# Patient Record
Sex: FEMALE | Race: BLACK OR AFRICAN AMERICAN | NOT HISPANIC OR LATINO | Employment: FULL TIME | URBAN - METROPOLITAN AREA
[De-identification: names, ages, dates, MRNs, and addresses within clinical notes are randomized per-mention and may not be internally consistent; named-entity substitution may affect disease eponyms.]

---

## 2018-02-02 PROBLEM — A53.9 SYPHILIS: Status: ACTIVE | Noted: 2018-02-02

## 2018-02-04 PROBLEM — Z00.00 PHYSICAL EXAM, ANNUAL: Status: ACTIVE | Noted: 2018-02-04

## 2018-02-04 PROBLEM — Z11.3 SCREENING EXAMINATION FOR VENEREAL DISEASE: Status: ACTIVE | Noted: 2018-02-04

## 2018-02-04 PROBLEM — R63.5 WEIGHT GAIN: Status: ACTIVE | Noted: 2018-02-04

## 2018-02-11 PROBLEM — E03.9 ACQUIRED HYPOTHYROIDISM: Status: ACTIVE | Noted: 2018-02-11

## 2018-02-11 PROBLEM — E78.5 HLD (HYPERLIPIDEMIA): Status: ACTIVE | Noted: 2018-02-11

## 2018-02-11 PROBLEM — D64.9 ANEMIA: Status: ACTIVE | Noted: 2018-02-11

## 2018-03-09 PROBLEM — E88.819 INSULIN RESISTANCE: Status: ACTIVE | Noted: 2018-03-09

## 2018-03-20 PROBLEM — D89.89 AUTOIMMUNE DISORDER: Status: ACTIVE | Noted: 2018-03-20

## 2018-05-07 PROBLEM — Z00.00 PHYSICAL EXAM, ANNUAL: Status: RESOLVED | Noted: 2018-02-04 | Resolved: 2018-05-07

## 2018-05-24 ENCOUNTER — INITIAL CONSULT (OUTPATIENT)
Dept: DERMATOLOGY | Facility: CLINIC | Age: 38
End: 2018-05-24
Payer: COMMERCIAL

## 2018-05-24 DIAGNOSIS — L20.89 OTHER ATOPIC DERMATITIS: ICD-10-CM

## 2018-05-24 DIAGNOSIS — L21.9 SEBORRHEIC DERMATITIS: Primary | ICD-10-CM

## 2018-05-24 DIAGNOSIS — L81.9 DYSCHROMIA: ICD-10-CM

## 2018-05-24 PROCEDURE — 99999 PR PBB SHADOW E&M-NEW PATIENT-LVL II: CPT | Mod: PBBFAC,,, | Performed by: DERMATOLOGY

## 2018-05-24 PROCEDURE — 99202 OFFICE O/P NEW SF 15 MIN: CPT | Mod: S$GLB,,, | Performed by: DERMATOLOGY

## 2018-05-24 RX ORDER — KETOCONAZOLE 20 MG/ML
SHAMPOO, SUSPENSION TOPICAL
Qty: 120 ML | Refills: 5 | Status: SHIPPED | OUTPATIENT
Start: 2018-05-24 | End: 2020-03-27

## 2018-05-24 RX ORDER — FLUOCINOLONE ACETONIDE 0.11 MG/ML
OIL TOPICAL
Qty: 1 BOTTLE | Refills: 5 | Status: SHIPPED | OUTPATIENT
Start: 2018-05-24 | End: 2023-04-27

## 2018-05-24 NOTE — PATIENT INSTRUCTIONS
Sunscreens for the Face  Neutrogena ultra sheer dry touch    CeraVe      Start broad spectrum sunscreen with SPF 50 and zinc oxide and/or titanium dioxide.  Use sunscreen daily.      Viviscal or AG-Pro Hair & Nail Vitamins

## 2018-05-24 NOTE — LETTER
May 24, 2018      Stan Espinosa, [facility]  [address] [place] [address]           [facility]  [address] [place] [address]  Phone: [phone]  Fax: [phone]          Patient: Daija Mistry   MR Number: [id number]   YOB: 1980   Date of Visit: 5/24/2018       Dear Stan Espinosa:    Thank you for referring Daija Mistry to me for evaluation. Attached you will find relevant portions of my assessment and plan of care.    If you have questions, please do not hesitate to call me. I look forward to following Daija Mistry along with you.    Sincerely,    Donya Romero MD    Enclosure  CC:  No Recipients    If you would like to receive this communication electronically, please contact [email or web address] or [phone] to request more information on [employer] access.    For providers and/or their staff who would like to refer a patient to [facility], please contact us through our one-stop-shop provider referral line, [facility], [place], at [phone].    If you feel you have received this communication in error or would no longer like to receive these types of communications, please e-mail [email or web address]

## 2018-05-24 NOTE — PROGRESS NOTES
Subjective:       Patient ID:  Daija Mistry is a 38 y.o. female who presents for   Chief Complaint   Patient presents with    Hair Loss     + ANDERSON  dry, flaky, shedding x 8 months    Hyperpigmentation     hyperpigmentation of face x 8 months     Eczema     x 2 years armpits tx otc HTC     Hx of + ANDERSON and elevated ESR at 67.     History of Present Illness: The patient presents with chief complaint of dryness.  Location: scalp  Duration: 8 months  Signs/Symptoms: burning    Prior treatments: shea moisture shampoo, jamaciain oil, asiam wash, clobetasol foam (burning)    She also c/o hyperpigmentation of the face           Review of Systems   Constitutional: Negative for fever and chills.   Gastrointestinal: Negative for nausea and vomiting.   Skin: Positive for itching and rash. Negative for daily sunscreen use, activity-related sunscreen use and recent sunburn.   Hematologic/Lymphatic: Does not bruise/bleed easily.        Objective:    Physical Exam   Constitutional: She appears well-developed and well-nourished. No distress.   Neurological: She is alert and oriented to person, place, and time. She is not disoriented.   Psychiatric: She has a normal mood and affect.   Skin:   Areas Examined (abnormalities noted in diagram):   Scalp / Hair Palpated and Inspected  Head / Face Inspection Performed  Neck Inspection Performed  Chest / Axilla Inspection Performed  Abdomen Inspection Performed  Back Inspection Performed  RUE Inspected  LUE Inspection Performed  Nails and Digits Inspection Performed                   Diagram Legend     Erythematous scaling macule/papule c/w actinic keratosis       Vascular papule c/w angioma      Pigmented verrucoid papule/plaque c/w seborrheic keratosis      Yellow umbilicated papule c/w sebaceous hyperplasia      Irregularly shaped tan macule c/w lentigo     1-2 mm smooth white papules consistent with Milia      Movable subcutaneous cyst with punctum c/w epidermal inclusion cyst       Subcutaneous movable cyst c/w pilar cyst      Firm pink to brown papule c/w dermatofibroma      Pedunculated fleshy papule(s) c/w skin tag(s)      Evenly pigmented macule c/w junctional nevus     Mildly variegated pigmented, slightly irregular-bordered macule c/w mildly atypical nevus      Flesh colored to evenly pigmented papule c/w intradermal nevus       Pink pearly papule/plaque c/w basal cell carcinoma      Erythematous hyperkeratotic cursted plaque c/w SCC      Surgical scar with no sign of skin cancer recurrence      Open and closed comedones      Inflammatory papules and pustules      Verrucoid papule consistent consistent with wart     Erythematous eczematous patches and plaques     Dystrophic onycholytic nail with subungual debris c/w onychomycosis     Umbilicated papule    Erythematous-base heme-crusted tan verrucoid plaque consistent with inflamed seborrheic keratosis     Erythematous Silvery Scaling Plaque c/w Psoriasis     See annotation      Assessment / Plan:        Seborrheic dermatitis  -     ketoconazole (NIZORAL) 2 % shampoo; Wash hair with medicated shampoo at least 2x/week - let sit on scalp at least 5 minutes prior to rinsing  Dispense: 120 mL; Refill: 5  -     fluocinolone (DERMA-SMOOTHE) 0.01 % external oil; Apply oil to scalp twice a day  Dispense: 1 Bottle; Refill: 5  -     + flares of seb derm.  Will treat seb derm first.  Consider biopsy if alopecia worsens to r/o DLE.  No evidence of DLE currently.    Dyschromia  Of face, possible melasma s/p pregnancy.  Will start HQ 6% cream and sunscreen    Other atopic dermatitis  -     crisaborole (EUCRISA) 2 % Oint; AAA bid for atopic dermatitis/eczema  Dispense: 60 g; Refill: 3  -     Of left axilla, will start above med.              Follow-up in about 3 months (around 8/24/2018).

## 2018-11-07 PROBLEM — G45.9 TIA (TRANSIENT ISCHEMIC ATTACK): Status: ACTIVE | Noted: 2018-11-07

## 2018-11-07 PROBLEM — I10 ESSENTIAL HYPERTENSION, BENIGN: Status: ACTIVE | Noted: 2018-11-07

## 2018-11-07 PROBLEM — R94.31 ABNORMAL EKG: Status: ACTIVE | Noted: 2018-11-07

## 2018-11-07 PROBLEM — F32.9 REACTIVE DEPRESSION: Status: ACTIVE | Noted: 2018-11-07

## 2018-11-07 PROBLEM — R00.2 PALPITATION: Status: ACTIVE | Noted: 2018-11-07

## 2018-11-12 ENCOUNTER — TELEPHONE (OUTPATIENT)
Dept: RADIOLOGY | Facility: HOSPITAL | Age: 38
End: 2018-11-12

## 2019-07-25 PROBLEM — R05.3 CHRONIC COUGH: Status: ACTIVE | Noted: 2019-07-25

## 2019-07-25 PROBLEM — Z00.00 ANNUAL PHYSICAL EXAM: Status: ACTIVE | Noted: 2018-02-04

## 2019-07-25 PROBLEM — E55.9 VITAMIN D DEFICIENCY: Status: ACTIVE | Noted: 2019-07-25

## 2019-07-25 PROBLEM — R91.8 ABNORMAL CT LUNG SCREENING: Status: ACTIVE | Noted: 2019-07-25

## 2019-07-25 PROBLEM — E78.2 MIXED HYPERLIPIDEMIA: Status: ACTIVE | Noted: 2018-02-11

## 2019-08-01 ENCOUNTER — TELEPHONE (OUTPATIENT)
Dept: PULMONOLOGY | Facility: CLINIC | Age: 39
End: 2019-08-01

## 2019-08-01 NOTE — TELEPHONE ENCOUNTER
----- Message from Jeannine Giles sent at 7/31/2019  4:24 PM CDT -----  Contact: marci from Dr Richardson's office  doctor called to schedule an urgent appointment specifically with Dr Weiss  And wishes to speak with a nurse regarding this matter.      she  can be reached at [phone]      Thanks  KB

## 2019-08-08 ENCOUNTER — LAB VISIT (OUTPATIENT)
Dept: LAB | Facility: HOSPITAL | Age: 39
End: 2019-08-08
Attending: INTERNAL MEDICINE
Payer: COMMERCIAL

## 2019-08-08 ENCOUNTER — CLINICAL SUPPORT (OUTPATIENT)
Dept: PULMONOLOGY | Facility: CLINIC | Age: 39
End: 2019-08-08
Payer: COMMERCIAL

## 2019-08-08 ENCOUNTER — OFFICE VISIT (OUTPATIENT)
Dept: SLEEP MEDICINE | Facility: CLINIC | Age: 39
End: 2019-08-08
Payer: COMMERCIAL

## 2019-08-08 ENCOUNTER — INITIAL CONSULT (OUTPATIENT)
Dept: RHEUMATOLOGY | Facility: CLINIC | Age: 39
End: 2019-08-08
Payer: COMMERCIAL

## 2019-08-08 VITALS
BODY MASS INDEX: 43.53 KG/M2 | HEIGHT: 65 IN | SYSTOLIC BLOOD PRESSURE: 122 MMHG | DIASTOLIC BLOOD PRESSURE: 74 MMHG | HEART RATE: 112 BPM | WEIGHT: 261.25 LBS

## 2019-08-08 VITALS
DIASTOLIC BLOOD PRESSURE: 74 MMHG | SYSTOLIC BLOOD PRESSURE: 122 MMHG | WEIGHT: 261.25 LBS | HEIGHT: 65 IN | OXYGEN SATURATION: 94 % | HEART RATE: 112 BPM | RESPIRATION RATE: 16 BRPM | BODY MASS INDEX: 43.53 KG/M2

## 2019-08-08 DIAGNOSIS — R05.3 CHRONIC COUGH: Primary | ICD-10-CM

## 2019-08-08 DIAGNOSIS — J84.9 ILD (INTERSTITIAL LUNG DISEASE): Primary | ICD-10-CM

## 2019-08-08 DIAGNOSIS — R05.3 CHRONIC COUGH: ICD-10-CM

## 2019-08-08 DIAGNOSIS — J84.89 ORGANIZED PNEUMONIA: Primary | ICD-10-CM

## 2019-08-08 DIAGNOSIS — R76.8 ANTI-RNP ANTIBODIES PRESENT: ICD-10-CM

## 2019-08-08 DIAGNOSIS — E78.2 MIXED HYPERLIPIDEMIA: ICD-10-CM

## 2019-08-08 DIAGNOSIS — R76.8 ANA POSITIVE: ICD-10-CM

## 2019-08-08 DIAGNOSIS — I10 ESSENTIAL HYPERTENSION, BENIGN: ICD-10-CM

## 2019-08-08 DIAGNOSIS — R76.8 POSITIVE ANA (ANTINUCLEAR ANTIBODY): ICD-10-CM

## 2019-08-08 DIAGNOSIS — J84.89 ORGANIZED PNEUMONIA: ICD-10-CM

## 2019-08-08 DIAGNOSIS — J98.4 RESTRICTIVE LUNG DISEASE: ICD-10-CM

## 2019-08-08 DIAGNOSIS — J84.9 ILD (INTERSTITIAL LUNG DISEASE): ICD-10-CM

## 2019-08-08 LAB
BASOPHILS # BLD AUTO: 0.06 K/UL (ref 0–0.2)
BASOPHILS NFR BLD: 0.4 % (ref 0–1.9)
CRP SERPL-MCNC: 20.7 MG/L (ref 0–8.2)
DIFFERENTIAL METHOD: ABNORMAL
EOSINOPHIL # BLD AUTO: 0.2 K/UL (ref 0–0.5)
EOSINOPHIL NFR BLD: 1.3 % (ref 0–8)
ERYTHROCYTE [DISTWIDTH] IN BLOOD BY AUTOMATED COUNT: 18.1 % (ref 11.5–14.5)
ERYTHROCYTE [SEDIMENTATION RATE] IN BLOOD BY WESTERGREN METHOD: 46 MM/HR (ref 0–36)
HCT VFR BLD AUTO: 42.5 % (ref 37–48.5)
HGB BLD-MCNC: 13.3 G/DL (ref 12–16)
IMM GRANULOCYTES # BLD AUTO: 0.06 K/UL (ref 0–0.04)
IMM GRANULOCYTES NFR BLD AUTO: 0.4 % (ref 0–0.5)
LYMPHOCYTES # BLD AUTO: 4.5 K/UL (ref 1–4.8)
LYMPHOCYTES NFR BLD: 28.7 % (ref 18–48)
MCH RBC QN AUTO: 25.4 PG (ref 27–31)
MCHC RBC AUTO-ENTMCNC: 31.3 G/DL (ref 32–36)
MCV RBC AUTO: 81 FL (ref 82–98)
MONOCYTES # BLD AUTO: 1.3 K/UL (ref 0.3–1)
MONOCYTES NFR BLD: 8 % (ref 4–15)
NEUTROPHILS # BLD AUTO: 9.7 K/UL (ref 1.8–7.7)
NEUTROPHILS NFR BLD: 61.6 % (ref 38–73)
NRBC BLD-RTO: 0 /100 WBC
PLATELET # BLD AUTO: 346 K/UL (ref 150–350)
PMV BLD AUTO: 11.4 FL (ref 9.2–12.9)
RBC # BLD AUTO: 5.24 M/UL (ref 4–5.4)
WBC # BLD AUTO: 15.67 K/UL (ref 3.9–12.7)

## 2019-08-08 PROCEDURE — 99999 PR PBB SHADOW E&M-EST. PATIENT-LVL V: ICD-10-PCS | Mod: PBBFAC,,, | Performed by: INTERNAL MEDICINE

## 2019-08-08 PROCEDURE — 86635 COCCIDIOIDES ANTIBODY: CPT

## 2019-08-08 PROCEDURE — 86331 IMMUNODIFFUSION OUCHTERLONY: CPT | Mod: 91

## 2019-08-08 PROCEDURE — 82785 ASSAY OF IGE: CPT

## 2019-08-08 PROCEDURE — 99205 OFFICE O/P NEW HI 60 MIN: CPT | Mod: 25,S$GLB,, | Performed by: INTERNAL MEDICINE

## 2019-08-08 PROCEDURE — 94060 PR EVAL OF BRONCHOSPASM: ICD-10-PCS | Mod: S$GLB,,, | Performed by: INTERNAL MEDICINE

## 2019-08-08 PROCEDURE — 99999 PR PBB SHADOW E&M-EST. PATIENT-LVL III: CPT | Mod: PBBFAC,,, | Performed by: INTERNAL MEDICINE

## 2019-08-08 PROCEDURE — 99999 PR PBB SHADOW E&M-EST. PATIENT-LVL V: CPT | Mod: PBBFAC,,, | Performed by: INTERNAL MEDICINE

## 2019-08-08 PROCEDURE — 86235 NUCLEAR ANTIGEN ANTIBODY: CPT | Mod: 59

## 2019-08-08 PROCEDURE — 85025 COMPLETE CBC W/AUTO DIFF WBC: CPT

## 2019-08-08 PROCEDURE — 86039 ANTINUCLEAR ANTIBODIES (ANA): CPT

## 2019-08-08 PROCEDURE — 94060 EVALUATION OF WHEEZING: CPT | Mod: S$GLB,,, | Performed by: INTERNAL MEDICINE

## 2019-08-08 PROCEDURE — 86140 C-REACTIVE PROTEIN: CPT

## 2019-08-08 PROCEDURE — 86480 TB TEST CELL IMMUN MEASURE: CPT

## 2019-08-08 PROCEDURE — 99245 OFF/OP CONSLTJ NEW/EST HI 55: CPT | Mod: S$GLB,,, | Performed by: INTERNAL MEDICINE

## 2019-08-08 PROCEDURE — 99245 PR OFFICE CONSULTATION,LEVEL V: ICD-10-PCS | Mod: S$GLB,,, | Performed by: INTERNAL MEDICINE

## 2019-08-08 PROCEDURE — 85652 RBC SED RATE AUTOMATED: CPT

## 2019-08-08 PROCEDURE — 86038 ANTINUCLEAR ANTIBODIES: CPT

## 2019-08-08 PROCEDURE — 99205 PR OFFICE/OUTPT VISIT, NEW, LEVL V, 60-74 MIN: ICD-10-PCS | Mod: 25,S$GLB,, | Performed by: INTERNAL MEDICINE

## 2019-08-08 PROCEDURE — 99999 PR PBB SHADOW E&M-EST. PATIENT-LVL III: ICD-10-PCS | Mod: PBBFAC,,, | Performed by: INTERNAL MEDICINE

## 2019-08-08 RX ORDER — SULFAMETHOXAZOLE AND TRIMETHOPRIM 800; 160 MG/1; MG/1
2 TABLET ORAL
Qty: 12 TABLET | Refills: 3 | Status: SHIPPED | OUTPATIENT
Start: 2019-08-09 | End: 2019-09-08

## 2019-08-08 RX ORDER — PREDNISONE 20 MG/1
20 TABLET ORAL DAILY
Qty: 30 TABLET | Refills: 1 | Status: SHIPPED | OUTPATIENT
Start: 2019-08-08 | End: 2019-09-05

## 2019-08-08 NOTE — PROGRESS NOTES
RHEUMATOLOGY CLINIC INITIAL VISIT    Reason for referral:-  Referred for evaluation of positive ANDERSON and interstitial lung disease.    Chief complaints:-  To get checked for autoimmune disease .    HPI:-  Daija Flores a 39 y.o. pleasant female comes in for an initial visit with above chief complaints.  She was seen by our pulmonologist for interstitial lung disease and was referred for positive ANDERSON.  She also complains of joint pains for the last several months which has significantly reduced since starting prednisone therapy.  She denies any pain today.  She denies photosensitive malar rash, sicca syndrome, Raynaud's phenomenon, treatment resistant headaches, seizures, psychosis, sclerodactyly, calcinosis, esophageal dysfunction.  She complains of severe fatigue in the last month associated with weakness but could not say whether it was proximal or distal weakness.  No choking on dry foods.  No history of recurrent blood clots.  No personal or family history of psoriasis.  Treated for syphilis in the past -10 years ago.    Review of Systems   Constitutional: Negative for chills and fever.   HENT: Negative for congestion and sore throat.    Eyes: Negative for blurred vision and redness.   Respiratory: Negative for cough and shortness of breath.    Cardiovascular: Negative for chest pain and leg swelling.   Gastrointestinal: Negative for abdominal pain.   Genitourinary: Negative for dysuria.   Musculoskeletal: Positive for joint pain. Negative for back pain, falls, myalgias and neck pain.   Skin: Negative for rash.   Neurological: Negative for headaches.   Endo/Heme/Allergies: Does not bruise/bleed easily.   Psychiatric/Behavioral: Negative for memory loss. The patient does not have insomnia.        Past Medical History:   Diagnosis Date    ADHD (attention deficit hyperactivity disorder)     Asthma     Hypothyroidism     TIA (transient ischemic attack)        History reviewed. No pertinent surgical history.  "    Social History     Tobacco Use    Smoking status: Never Smoker    Smokeless tobacco: Never Used   Substance Use Topics    Alcohol use: Yes     Comment: social    Drug use: No       Family History   Problem Relation Age of Onset    Cancer Maternal Aunt         Breast, Back, Lung Cancer    Diabetes Mother     Hypertension Mother     Heart disease Mother     Stroke Mother     Hypertension Father     Heart disease Father        Review of patient's allergies indicates:   Allergen Reactions    Ceftriaxone Swelling     Patient states that BP spike when taken rocephin.     Citrus and derivatives     Codeine Swelling       Vitals:    08/08/19 1240   BP: 122/74   Pulse: (!) 112   Weight: 118.5 kg (261 lb 3.9 oz)   Height: 5' 5" (1.651 m)   PainSc:   5       Physical Exam   Constitutional: She is oriented to person, place, and time and well-developed, well-nourished, and in no distress. No distress.   HENT:   Head: Normocephalic.   Mouth/Throat: Oropharynx is clear and moist.   Eyes: Pupils are equal, round, and reactive to light. Conjunctivae and EOM are normal.   Neck: Normal range of motion. Neck supple.   Cardiovascular: Normal rate and intact distal pulses.   Pulmonary/Chest: Effort normal. No respiratory distress.   Abdominal: Soft. There is no tenderness.   Musculoskeletal:   No synovitis over small joints of hands or feet.  No effusion over large joints.   Neurological: She is alert and oriented to person, place, and time. No cranial nerve deficit.   Skin: Skin is warm. No rash noted. No erythema.   Psychiatric: Mood and affect normal.   Nursing note and vitals reviewed.      Labs:-    Radiographs:-  Independent visualization of images done.    Old and Outside medical records :-  Reviewed old and all outside medical records available in Care Everywhere.    Medication List with Changes/Refills   Current Medications    ALBUTEROL 90 MCG/ACTUATION INHALER    Inhale 2 puffs into the lungs every 6 (six) " hours as needed for Wheezing.    DULAGLUTIDE (TRULICITY) 1.5 MG/0.5 ML PNIJ    Inject 1.5 mg into the skin every 7 days.    FLUOCINOLONE (DERMA-SMOOTHE) 0.01 % EXTERNAL OIL    Apply oil to scalp twice a day    FLUTICASONE (FLONASE) 50 MCG/ACTUATION NASAL SPRAY    2 sprays (100 mcg total) by Each Nare route once daily.    KETOCONAZOLE (NIZORAL) 2 % SHAMPOO    Wash hair with medicated shampoo at least 2x/week - let sit on scalp at least 5 minutes prior to rinsing    LEVOTHYROXINE (SYNTHROID) 88 MCG TABLET    Take 1 tablet (88 mcg total) by mouth once daily.    PREDNISONE (DELTASONE) 20 MG TABLET    Take 1 tablet (20 mg total) by mouth once daily.    QUETIAPINE (SEROQUEL) 50 MG TABLET    Take 1 tablet (50 mg total) by mouth nightly.    SERTRALINE (ZOLOFT) 50 MG TABLET    Take 1 tablet (50 mg total) by mouth once daily.    SULFAMETHOXAZOLE-TRIMETHOPRIM 800-160MG (BACTRIM DS) 800-160 MG TAB    Take 2 tablets by mouth every Mon, Wed, Fri.    TRIAMTERENE-HYDROCHLOROTHIAZIDE 37.5-25 MG (MAXZIDE-25) 37.5-25 MG PER TABLET    Take 1 tablet by mouth once daily.       Assessment/Plans:-  1. ILD (interstitial lung disease)    2. Positive ANDERSON (antinuclear antibody)      Problem List Items Addressed This Visit        Pulmonary    ILD (interstitial lung disease) - Primary    Overview     pulmonary referral for ?sarcoidosis, lupus like syndrome         Current Assessment & Plan     Interstitial lung disease with ground-glass opacities and positive ANDERSON.  Possibly related to CTD related ILD.  Bronchoscopy scheduled for next week.  Continue prednisone in the meantime and once bronchoscopy ruled out infection, start CellCept         Relevant Orders    Rheumatoid factor    Cyclic citrul peptide antibody, IgG    Protein electrophoresis, serum    Immunofixation electrophoresis    DRVVT    Cardiolipin antibody    Beta-2 glycoprotein Abs (IgA, IgG, IgM)    RPR    Anti-scleroderma antibody    RNA polymerase III Ab, IgG    MyoMarker Panel 3     Anti-neutrophilic cytoplasmic antibody    Proteinase 3 Autoantibodies    Myeloperoxidase Antibody (MPO)    CK (Completed)    Aldolase       Immunology/Multi System    Positive ANDERSON (antinuclear antibody)    Current Assessment & Plan     Positive ANDERSON with positive RNP antibody.  ANDERSON was done under direct method.  Will check immunofluorescence testing.  Also check for scleroderma antibodies and myositis antibodies including other activity markers and muscle enzymes to look for any underlying connective tissue disease.         Relevant Orders    Rheumatoid factor    Cyclic citrul peptide antibody, IgG    Protein electrophoresis, serum    Immunofixation electrophoresis    DRVVT    Cardiolipin antibody    Beta-2 glycoprotein Abs (IgA, IgG, IgM)    RPR    Anti-scleroderma antibody    RNA polymerase III Ab, IgG    MyoMarker Panel 3    Anti-neutrophilic cytoplasmic antibody    Proteinase 3 Autoantibodies    Myeloperoxidase Antibody (MPO)    CK (Completed)    Aldolase          Follow up in about 4 weeks (around 9/5/2019).    Thank you for allowing me to participate in the care ofDaija Mistry.    Disclaimer: This note was prepared using voice recognition system and is likely to have sound alike errors and is not proof read.  Please call me with any questions.

## 2019-08-08 NOTE — ASSESSMENT & PLAN NOTE
Positive ANDERSON with positive RNP antibody.  ANDERSON was done under direct method.  Will check immunofluorescence testing.  Also check for scleroderma antibodies and myositis antibodies including other activity markers and muscle enzymes to look for any underlying connective tissue disease.

## 2019-08-08 NOTE — PROGRESS NOTES
Subjective:       Patient ID: Daija Mistry is a 39 y.o. female.  Patient Active Problem List   Diagnosis    Syphilis    Weight gain    Annual physical exam    Anemia    Mixed hyperlipidemia    Acquired hypothyroidism    Insulin resistance    Autoimmune disorder    TIA (transient ischemic attack)    Essential hypertension, benign    Palpitation    Reactive depression    Abnormal EKG    ILD (interstitial lung disease)    Vitamin D deficiency    Chronic cough    Restrictive lung disease       Chief Complaint:  Daija Mistry 39 y.o.  Referred to me by Dr. 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  She tells me she has a longstanding history of asthma.  This asthma has been stable hardly requiring any interventions.  Early this year she developed a cough shortness of breath congestion heart be admitted at [facility].  During this admission she was treated as pneumonia with multiple antibiotics and steroids.  She was seen by Dr. Mccauley.  Since her discharge she has been on high-dose steroids at  40 mg this was weaned down to 20 now 10 and has been bumped up back to 20 mg.  She is very active at baseline however most recently very short of breath dyspnea on exertion walking from the parking lot to here.  She denies any fever in no weight loss no hemoptysis no sick contacts no travel.  She denies any knowledge of history of autoimmune problems however most recently she had some blood work last year and this year which showed a positive ANDERSON.  I have reviewed most of her lab work which show that her RNP was positive 1.7.    Her smooth muscle antibody was also positive.  SSB was elevated.  Rheumatoid factor was negative.  She denies any joint aches any iritis symptoms.  Denies any skin lesions.  She has had some falling up overhead.  She has a CT scan that was performed in April of 2019  and May of 2019 which were reviewed  Findings were reviewed with patient  She shows extensive subpleural reticular nodular changes of fibrotic changes.  Differentials UIP or NSIP  CAT was -ve for PE  In essence she has an interstitial lung disease with positive ANDERSON positive RNP serologies.  She will need to establish care with Rheumatology.  Her like her to stay on prednisone 20 mg with Bactrim for PCP prophylaxis.  For asthma she had been started on Breo Ellipta.  She is still on this treatment  She has nebulizer at home     Immunization History   Administered Date(s) Administered    Tdap 02/02/2018     Social History     Tobacco Use   Smoking Status Never Smoker   Smokeless Tobacco Never Used           Immunization History   Administered Date(s) Administered    Tdap 02/02/2018           The following portions of the patient's history were reviewed and updated as appropriate:   She  has a past medical history of ADHD (attention deficit hyperactivity disorder), Asthma, Hypothyroidism, and TIA (transient ischemic attack).  She does not have any pertinent problems on file.  She  has no past surgical history on file.  Her family history includes Cancer in her maternal aunt; Diabetes in her mother; Heart disease in her father and mother; Hypertension in her father and mother; Stroke in her mother.  She  reports that she has never smoked. She has never used smokeless tobacco. She reports that she drinks alcohol. She reports that she does not use drugs.  She has a current medication list which includes the following prescription(s): dulaglutide, fluticasone propionate, ketoconazole, levothyroxine, quetiapine, sertraline, triamterene-hydrochlorothiazide 37.5-25 mg, albuterol, fluocinolone, prednisone, and sulfamethoxazole-trimethoprim 800-160mg.  Current Outpatient Medications on File Prior to Visit   Medication Sig Dispense Refill    dulaglutide (TRULICITY) 1.5 mg/0.5 mL PnIj Inject 1.5 mg into the skin every 7 days. 2  "mL 5    fluticasone (FLONASE) 50 mcg/actuation nasal spray 2 sprays (100 mcg total) by Each Nare route once daily. 16 g 2    ketoconazole (NIZORAL) 2 % shampoo Wash hair with medicated shampoo at least 2x/week - let sit on scalp at least 5 minutes prior to rinsing 120 mL 5    levothyroxine (SYNTHROID) 88 MCG tablet Take 1 tablet (88 mcg total) by mouth once daily. 30 tablet 5    QUEtiapine (SEROQUEL) 50 MG tablet Take 1 tablet (50 mg total) by mouth nightly. 30 tablet 3    sertraline (ZOLOFT) 50 MG tablet Take 1 tablet (50 mg total) by mouth once daily. 30 tablet 5    triamterene-hydrochlorothiazide 37.5-25 mg (MAXZIDE-25) 37.5-25 mg per tablet Take 1 tablet by mouth once daily. 30 tablet 5    albuterol 90 mcg/actuation inhaler Inhale 2 puffs into the lungs every 6 (six) hours as needed for Wheezing. 1 Inhaler 1    fluocinolone (DERMA-SMOOTHE) 0.01 % external oil Apply oil to scalp twice a day 1 Bottle 5     No current facility-administered medications on file prior to visit.      She is allergic to ceftriaxone; citrus and derivatives; and codeine..     Review of Systems   Constitutional: Positive for malaise/fatigue.   HENT: Negative.    Eyes: Negative.    Respiratory: Positive for shortness of breath.    Cardiovascular: Negative.  Negative for chest pain, palpitations, orthopnea and claudication.   Gastrointestinal: Negative.    Genitourinary: Negative.    Musculoskeletal: Negative.    Skin: Negative.    Psychiatric/Behavioral: The patient is nervous/anxious.         Objective:       Vitals:    08/08/19 1104   BP: 122/74   Pulse: (!) 112   Resp: 16   SpO2: (!) 94%   Weight: 118.5 kg (261 lb 3.9 oz)   Height: 5' 5" (1.651 m)     Physical Exam   Constitutional: She is oriented to person, place, and time. She appears well-developed and well-nourished.   HENT:   Head: Normocephalic and atraumatic.   Nose: Nose normal.   Mouth/Throat: Oropharynx is clear and moist. No oropharyngeal exudate.   Eyes: Pupils are " equal, round, and reactive to light. Conjunctivae and EOM are normal. Left eye exhibits no discharge.   Neck: Normal range of motion. Neck supple. No JVD present. No tracheal deviation present. No thyromegaly present.   Cardiovascular: Normal rate, regular rhythm and normal heart sounds.   Pulmonary/Chest: Effort normal. No stridor. No respiratory distress. She has no wheezes.   Inspiratory crackles   Abdominal: Soft. Bowel sounds are normal. She exhibits no distension. There is no tenderness.   Musculoskeletal: Normal range of motion. She exhibits no edema.   Lymphadenopathy:     She has no cervical adenopathy.   Neurological: She is alert and oriented to person, place, and time. No cranial nerve deficit.   Skin: Skin is warm and dry. Capillary refill takes 2 to 3 seconds.   Psychiatric: She has a normal mood and affect.   Nursing note and vitals reviewed.        Data Review:   Diagnostics: CT of chest performed on April 2019 and may 2019               Labs reveiwed on my chart  ANDERSON +ve 2018 and 2019  RNP +ve 1.7  SSB was 2.4  dSDNA was -ve  ESR was 66  HIV was -ve  RF was -ve  UA prot    BUN 13, Creat 0.86  Assessment:        Problem List Items Addressed This Visit     Mixed hyperlipidemia    Essential hypertension, benign    Overview     well controlled on meds         ILD (interstitial lung disease)    Overview     pulmonary referral for ?sarcoidosis, lupus like syndrome         Chronic cough    Overview     pulmonary specialist  continue steroids for now  continue inhalers for now         Relevant Medications    predniSONE (DELTASONE) 20 MG tablet    sulfamethoxazole-trimethoprim 800-160mg (BACTRIM DS) 800-160 mg Tab (Start on 8/9/2019)    Other Relevant Orders    Ambulatory Referral to Rheumatology    X-Ray Chest PA And Lateral    Case request GI: Bronchoscopy (Completed)    ANGIOTENSIN CONVERTING ENZYME    B-TYPE NATRIURETIC PEPTIDE    Restrictive lung disease    Current Assessment & Plan     FVC  2.03L (  61.8%), FEV1/FVC 78.13  FEV1 : 1.59L(58.3%)           Other Visit Diagnoses     Organized pneumonia    -  Primary    Relevant Medications    predniSONE (DELTASONE) 20 MG tablet    sulfamethoxazole-trimethoprim 800-160mg (BACTRIM DS) 800-160 mg Tab (Start on 8/9/2019)    Other Relevant Orders    Spirometry with/without bronchodilator    Sedimentation rate    C-REACTIVE PROTEIN    FUNGAL PRECIPITINS (HYPERSENSITIVITY PNEUMONITISI)    FUNGAL IMMUNODIFFUSION - BLOOD    ANDERSON    IGE    CBC auto differential    ANTI SM/RNP ANTIBODY    QUANTIFERON GOLD TB    Complete PFT without bronchodilator - Clinic    Stress test, pulmonary    PULM - Arterial Blood Gases--in addition to PFT only    Ambulatory Referral to Rheumatology    X-Ray Chest PA And Lateral    Case request GI: Bronchoscopy (Completed)    ANGIOTENSIN CONVERTING ENZYME    ANDERSON positive        Relevant Medications    predniSONE (DELTASONE) 20 MG tablet    sulfamethoxazole-trimethoprim 800-160mg (BACTRIM DS) 800-160 mg Tab (Start on 8/9/2019)    Other Relevant Orders    Spirometry with/without bronchodilator    Sedimentation rate    C-REACTIVE PROTEIN    FUNGAL PRECIPITINS (HYPERSENSITIVITY PNEUMONITISI)    FUNGAL IMMUNODIFFUSION - BLOOD    ANDERSON    IGE    CBC auto differential    ANTI SM/RNP ANTIBODY    QUANTIFERON GOLD TB    Complete PFT without bronchodilator - Clinic    Stress test, pulmonary    PULM - Arterial Blood Gases--in addition to PFT only    Ambulatory Referral to Rheumatology    X-Ray Chest PA And Lateral    Case request GI: Bronchoscopy (Completed)    ANGIOTENSIN CONVERTING ENZYME    Anti-RNP antibodies present        Relevant Medications    predniSONE (DELTASONE) 20 MG tablet    sulfamethoxazole-trimethoprim 800-160mg (BACTRIM DS) 800-160 mg Tab (Start on 8/9/2019)    Other Relevant Orders    Spirometry with/without bronchodilator    Sedimentation rate    C-REACTIVE PROTEIN    FUNGAL PRECIPITINS (HYPERSENSITIVITY PNEUMONITISI)    FUNGAL IMMUNODIFFUSION -  BLOOD    ANDERSON    IGE    CBC auto differential    ANTI SM/RNP ANTIBODY    QUANTIFERON GOLD TB    Complete PFT without bronchodilator - Clinic    Stress test, pulmonary    PULM - Arterial Blood Gases--in addition to PFT only    Ambulatory Referral to Rheumatology    X-Ray Chest PA And Lateral    Case request GI: Bronchoscopy (Completed)    ANGIOTENSIN CONVERTING ENZYME        recommended see Rheumatology  Stay on Prednisone 20 mg with bactrim for PCP prophylaxis    Plan:        Orders Placed This Encounter   Procedures    X-Ray Chest PA And Lateral     Standing Status:   Future     Standing Expiration Date:   8/7/2020    Sedimentation rate     Standing Status:   Future     Standing Expiration Date:   10/6/2020    C-REACTIVE PROTEIN     Standing Status:   Future     Standing Expiration Date:   10/6/2020    FUNGAL PRECIPITINS (HYPERSENSITIVITY PNEUMONITISI)     Standing Status:   Future     Standing Expiration Date:   10/6/2020    FUNGAL IMMUNODIFFUSION - BLOOD     Standing Status:   Future     Standing Expiration Date:   10/6/2020    ANDERSON     Standing Status:   Future     Standing Expiration Date:   10/6/2020    IGE     Standing Status:   Future     Standing Expiration Date:   10/6/2020    CBC auto differential     Standing Status:   Future     Standing Expiration Date:   10/6/2020    ANTI SM/RNP ANTIBODY     Standing Status:   Future     Standing Expiration Date:   10/6/2020    QUANTIFERON GOLD TB     Standing Status:   Future     Standing Expiration Date:   10/6/2020    ANGIOTENSIN CONVERTING ENZYME     Standing Status:   Future     Standing Expiration Date:   10/6/2020    B-TYPE NATRIURETIC PEPTIDE     Standing Status:   Future     Standing Expiration Date:   10/6/2020    Ambulatory Referral to Rheumatology     Referral Priority:   Routine     Referral Type:   Consultation     Referral Reason:   Specialty Services Required     Requested Specialty:   Rheumatology     Number of Visits Requested:   1     Spirometry with/without bronchodilator     Standing Status:   Future     Standing Expiration Date:   8/7/2020    Complete PFT without bronchodilator - Clinic     Standing Status:   Future     Standing Expiration Date:   8/8/2020    Stress test, pulmonary     Standing Status:   Future     Standing Expiration Date:   8/7/2020    PULM - Arterial Blood Gases--in addition to PFT only     Standing Status:   Future     Standing Expiration Date:   8/7/2020    Case request GI: Bronchoscopy     Order Specific Question:   Medical Necessity:     Answer:   Medically Urgent [101]     Order Specific Question:   CPT Code:     Answer:   AK BRONCHOSCOPY,TRANSBRONCH BIOPSY [66916]     Order Specific Question:   Add on case?     Answer:   Yes [1]     Order Specific Question:   Requested Time     Answer:   7:00 AM     Order Specific Question:   Case Referring Provider     Answer:   VIRGILIO COLEMAN [[id number]]     Order Specific Question:   Positioning:     Answer:   Supine [1004]     Order Specific Question:   Post-Procedure Disposition:     Answer:   Amb Surgery/DOSC [2]       Requested Prescriptions     Signed Prescriptions Disp Refills    predniSONE (DELTASONE) 20 MG tablet 30 tablet 1     Sig: Take 1 tablet (20 mg total) by mouth once daily.    sulfamethoxazole-trimethoprim 800-160mg (BACTRIM DS) 800-160 mg Tab 12 tablet 3     Sig: Take 2 tablets by mouth every Mon, Wed, Fri.       Follow up in about 4 weeks (around 9/5/2019), or labs today, kaitlin today, copy of Echo, cxr today, for PFT, 6MWD, ABG, Bronchoscopy consent and prep, Referal to Rheumatology.    This note was prepared using voice recognition system and is likely to have sound alike errors that may have been overlooked even after proof reading.  Please call me with any questions    Discussed diagnosis, its evaluation, treatment and usual course. All questions answered.    Thank you for the courtesy of participating in the care of this patient    Virgilio Coleman  MD

## 2019-08-08 NOTE — H&P (VIEW-ONLY)
Subjective:       Patient ID: Daija Mistry is a 39 y.o. female.  Patient Active Problem List   Diagnosis    Syphilis    Weight gain    Annual physical exam    Anemia    Mixed hyperlipidemia    Acquired hypothyroidism    Insulin resistance    Autoimmune disorder    TIA (transient ischemic attack)    Essential hypertension, benign    Palpitation    Reactive depression    Abnormal EKG    ILD (interstitial lung disease)    Vitamin D deficiency    Chronic cough    Restrictive lung disease       Chief Complaint:  Daija Mistry 39 y.o.  Referred to me by Dr. 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  She tells me she has a longstanding history of asthma.  This asthma has been stable hardly requiring any interventions.  Early this year she developed a cough shortness of breath congestion heart be admitted at [facility].  During this admission she was treated as pneumonia with multiple antibiotics and steroids.  She was seen by Dr. Mccauley.  Since her discharge she has been on high-dose steroids at  40 mg this was weaned down to 20 now 10 and has been bumped up back to 20 mg.  She is very active at baseline however most recently very short of breath dyspnea on exertion walking from the parking lot to here.  She denies any fever in no weight loss no hemoptysis no sick contacts no travel.  She denies any knowledge of history of autoimmune problems however most recently she had some blood work last year and this year which showed a positive ANDERSON.  I have reviewed most of her lab work which show that her RNP was positive 1.7.    Her smooth muscle antibody was also positive.  SSB was elevated.  Rheumatoid factor was negative.  She denies any joint aches any iritis symptoms.  Denies any skin lesions.  She has had some falling up overhead.  She has a CT scan that was performed in April of 2019  and May of 2019 which were reviewed  Findings were reviewed with patient  She shows extensive subpleural reticular nodular changes of fibrotic changes.  Differentials UIP or NSIP  CAT was -ve for PE  In essence she has an interstitial lung disease with positive ANDERSON positive RNP serologies.  She will need to establish care with Rheumatology.  Her like her to stay on prednisone 20 mg with Bactrim for PCP prophylaxis.  For asthma she had been started on Breo Ellipta.  She is still on this treatment  She has nebulizer at home     Immunization History   Administered Date(s) Administered    Tdap 02/02/2018     Social History     Tobacco Use   Smoking Status Never Smoker   Smokeless Tobacco Never Used           Immunization History   Administered Date(s) Administered    Tdap 02/02/2018           The following portions of the patient's history were reviewed and updated as appropriate:   She  has a past medical history of ADHD (attention deficit hyperactivity disorder), Asthma, Hypothyroidism, and TIA (transient ischemic attack).  She does not have any pertinent problems on file.  She  has no past surgical history on file.  Her family history includes Cancer in her maternal aunt; Diabetes in her mother; Heart disease in her father and mother; Hypertension in her father and mother; Stroke in her mother.  She  reports that she has never smoked. She has never used smokeless tobacco. She reports that she drinks alcohol. She reports that she does not use drugs.  She has a current medication list which includes the following prescription(s): dulaglutide, fluticasone propionate, ketoconazole, levothyroxine, quetiapine, sertraline, triamterene-hydrochlorothiazide 37.5-25 mg, albuterol, fluocinolone, prednisone, and sulfamethoxazole-trimethoprim 800-160mg.  Current Outpatient Medications on File Prior to Visit   Medication Sig Dispense Refill    dulaglutide (TRULICITY) 1.5 mg/0.5 mL PnIj Inject 1.5 mg into the skin every 7 days. 2  "mL 5    fluticasone (FLONASE) 50 mcg/actuation nasal spray 2 sprays (100 mcg total) by Each Nare route once daily. 16 g 2    ketoconazole (NIZORAL) 2 % shampoo Wash hair with medicated shampoo at least 2x/week - let sit on scalp at least 5 minutes prior to rinsing 120 mL 5    levothyroxine (SYNTHROID) 88 MCG tablet Take 1 tablet (88 mcg total) by mouth once daily. 30 tablet 5    QUEtiapine (SEROQUEL) 50 MG tablet Take 1 tablet (50 mg total) by mouth nightly. 30 tablet 3    sertraline (ZOLOFT) 50 MG tablet Take 1 tablet (50 mg total) by mouth once daily. 30 tablet 5    triamterene-hydrochlorothiazide 37.5-25 mg (MAXZIDE-25) 37.5-25 mg per tablet Take 1 tablet by mouth once daily. 30 tablet 5    albuterol 90 mcg/actuation inhaler Inhale 2 puffs into the lungs every 6 (six) hours as needed for Wheezing. 1 Inhaler 1    fluocinolone (DERMA-SMOOTHE) 0.01 % external oil Apply oil to scalp twice a day 1 Bottle 5     No current facility-administered medications on file prior to visit.      She is allergic to ceftriaxone; citrus and derivatives; and codeine..     Review of Systems   Constitutional: Positive for malaise/fatigue.   HENT: Negative.    Eyes: Negative.    Respiratory: Positive for shortness of breath.    Cardiovascular: Negative.  Negative for chest pain, palpitations, orthopnea and claudication.   Gastrointestinal: Negative.    Genitourinary: Negative.    Musculoskeletal: Negative.    Skin: Negative.    Psychiatric/Behavioral: The patient is nervous/anxious.         Objective:       Vitals:    08/08/19 1104   BP: 122/74   Pulse: (!) 112   Resp: 16   SpO2: (!) 94%   Weight: 118.5 kg (261 lb 3.9 oz)   Height: 5' 5" (1.651 m)     Physical Exam   Constitutional: She is oriented to person, place, and time. She appears well-developed and well-nourished.   HENT:   Head: Normocephalic and atraumatic.   Nose: Nose normal.   Mouth/Throat: Oropharynx is clear and moist. No oropharyngeal exudate.   Eyes: Pupils are " equal, round, and reactive to light. Conjunctivae and EOM are normal. Left eye exhibits no discharge.   Neck: Normal range of motion. Neck supple. No JVD present. No tracheal deviation present. No thyromegaly present.   Cardiovascular: Normal rate, regular rhythm and normal heart sounds.   Pulmonary/Chest: Effort normal. No stridor. No respiratory distress. She has no wheezes.   Inspiratory crackles   Abdominal: Soft. Bowel sounds are normal. She exhibits no distension. There is no tenderness.   Musculoskeletal: Normal range of motion. She exhibits no edema.   Lymphadenopathy:     She has no cervical adenopathy.   Neurological: She is alert and oriented to person, place, and time. No cranial nerve deficit.   Skin: Skin is warm and dry. Capillary refill takes 2 to 3 seconds.   Psychiatric: She has a normal mood and affect.   Nursing note and vitals reviewed.        Data Review:   Diagnostics: CT of chest performed on April 2019 and may 2019               Labs reveiwed on my chart  ANDERSON +ve 2018 and 2019  RNP +ve 1.7  SSB was 2.4  dSDNA was -ve  ESR was 66  HIV was -ve  RF was -ve  UA prot    BUN 13, Creat 0.86  Assessment:        Problem List Items Addressed This Visit     Mixed hyperlipidemia    Essential hypertension, benign    Overview     well controlled on meds         ILD (interstitial lung disease)    Overview     pulmonary referral for ?sarcoidosis, lupus like syndrome         Chronic cough    Overview     pulmonary specialist  continue steroids for now  continue inhalers for now         Relevant Medications    predniSONE (DELTASONE) 20 MG tablet    sulfamethoxazole-trimethoprim 800-160mg (BACTRIM DS) 800-160 mg Tab (Start on 8/9/2019)    Other Relevant Orders    Ambulatory Referral to Rheumatology    X-Ray Chest PA And Lateral    Case request GI: Bronchoscopy (Completed)    ANGIOTENSIN CONVERTING ENZYME    B-TYPE NATRIURETIC PEPTIDE    Restrictive lung disease    Current Assessment & Plan     FVC  2.03L (  61.8%), FEV1/FVC 78.13  FEV1 : 1.59L(58.3%)           Other Visit Diagnoses     Organized pneumonia    -  Primary    Relevant Medications    predniSONE (DELTASONE) 20 MG tablet    sulfamethoxazole-trimethoprim 800-160mg (BACTRIM DS) 800-160 mg Tab (Start on 8/9/2019)    Other Relevant Orders    Spirometry with/without bronchodilator    Sedimentation rate    C-REACTIVE PROTEIN    FUNGAL PRECIPITINS (HYPERSENSITIVITY PNEUMONITISI)    FUNGAL IMMUNODIFFUSION - BLOOD    ANDERSON    IGE    CBC auto differential    ANTI SM/RNP ANTIBODY    QUANTIFERON GOLD TB    Complete PFT without bronchodilator - Clinic    Stress test, pulmonary    PULM - Arterial Blood Gases--in addition to PFT only    Ambulatory Referral to Rheumatology    X-Ray Chest PA And Lateral    Case request GI: Bronchoscopy (Completed)    ANGIOTENSIN CONVERTING ENZYME    ANDERSON positive        Relevant Medications    predniSONE (DELTASONE) 20 MG tablet    sulfamethoxazole-trimethoprim 800-160mg (BACTRIM DS) 800-160 mg Tab (Start on 8/9/2019)    Other Relevant Orders    Spirometry with/without bronchodilator    Sedimentation rate    C-REACTIVE PROTEIN    FUNGAL PRECIPITINS (HYPERSENSITIVITY PNEUMONITISI)    FUNGAL IMMUNODIFFUSION - BLOOD    ANDERSON    IGE    CBC auto differential    ANTI SM/RNP ANTIBODY    QUANTIFERON GOLD TB    Complete PFT without bronchodilator - Clinic    Stress test, pulmonary    PULM - Arterial Blood Gases--in addition to PFT only    Ambulatory Referral to Rheumatology    X-Ray Chest PA And Lateral    Case request GI: Bronchoscopy (Completed)    ANGIOTENSIN CONVERTING ENZYME    Anti-RNP antibodies present        Relevant Medications    predniSONE (DELTASONE) 20 MG tablet    sulfamethoxazole-trimethoprim 800-160mg (BACTRIM DS) 800-160 mg Tab (Start on 8/9/2019)    Other Relevant Orders    Spirometry with/without bronchodilator    Sedimentation rate    C-REACTIVE PROTEIN    FUNGAL PRECIPITINS (HYPERSENSITIVITY PNEUMONITISI)    FUNGAL IMMUNODIFFUSION -  BLOOD    ANDERSON    IGE    CBC auto differential    ANTI SM/RNP ANTIBODY    QUANTIFERON GOLD TB    Complete PFT without bronchodilator - Clinic    Stress test, pulmonary    PULM - Arterial Blood Gases--in addition to PFT only    Ambulatory Referral to Rheumatology    X-Ray Chest PA And Lateral    Case request GI: Bronchoscopy (Completed)    ANGIOTENSIN CONVERTING ENZYME        recommended see Rheumatology  Stay on Prednisone 20 mg with bactrim for PCP prophylaxis    Plan:        Orders Placed This Encounter   Procedures    X-Ray Chest PA And Lateral     Standing Status:   Future     Standing Expiration Date:   8/7/2020    Sedimentation rate     Standing Status:   Future     Standing Expiration Date:   10/6/2020    C-REACTIVE PROTEIN     Standing Status:   Future     Standing Expiration Date:   10/6/2020    FUNGAL PRECIPITINS (HYPERSENSITIVITY PNEUMONITISI)     Standing Status:   Future     Standing Expiration Date:   10/6/2020    FUNGAL IMMUNODIFFUSION - BLOOD     Standing Status:   Future     Standing Expiration Date:   10/6/2020    ANDERSON     Standing Status:   Future     Standing Expiration Date:   10/6/2020    IGE     Standing Status:   Future     Standing Expiration Date:   10/6/2020    CBC auto differential     Standing Status:   Future     Standing Expiration Date:   10/6/2020    ANTI SM/RNP ANTIBODY     Standing Status:   Future     Standing Expiration Date:   10/6/2020    QUANTIFERON GOLD TB     Standing Status:   Future     Standing Expiration Date:   10/6/2020    ANGIOTENSIN CONVERTING ENZYME     Standing Status:   Future     Standing Expiration Date:   10/6/2020    B-TYPE NATRIURETIC PEPTIDE     Standing Status:   Future     Standing Expiration Date:   10/6/2020    Ambulatory Referral to Rheumatology     Referral Priority:   Routine     Referral Type:   Consultation     Referral Reason:   Specialty Services Required     Requested Specialty:   Rheumatology     Number of Visits Requested:   1     Spirometry with/without bronchodilator     Standing Status:   Future     Standing Expiration Date:   8/7/2020    Complete PFT without bronchodilator - Clinic     Standing Status:   Future     Standing Expiration Date:   8/8/2020    Stress test, pulmonary     Standing Status:   Future     Standing Expiration Date:   8/7/2020    PULM - Arterial Blood Gases--in addition to PFT only     Standing Status:   Future     Standing Expiration Date:   8/7/2020    Case request GI: Bronchoscopy     Order Specific Question:   Medical Necessity:     Answer:   Medically Urgent [101]     Order Specific Question:   CPT Code:     Answer:   IA BRONCHOSCOPY,TRANSBRONCH BIOPSY [[id number]]     Order Specific Question:   Add on case?     Answer:   Yes [1]     Order Specific Question:   Requested Time     Answer:   7:00 AM     Order Specific Question:   Case Referring Provider     Answer:   VIRGILIO COLEMAN [[id number]]     Order Specific Question:   Positioning:     Answer:   Supine [1004]     Order Specific Question:   Post-Procedure Disposition:     Answer:   Amb Surgery/DOSC [2]       Requested Prescriptions     Signed Prescriptions Disp Refills    predniSONE (DELTASONE) 20 MG tablet 30 tablet 1     Sig: Take 1 tablet (20 mg total) by mouth once daily.    sulfamethoxazole-trimethoprim 800-160mg (BACTRIM DS) 800-160 mg Tab 12 tablet 3     Sig: Take 2 tablets by mouth every Mon, Wed, Fri.       Follow up in about 4 weeks (around 9/5/2019), or labs today, kaitlin today, copy of Echo, cxr today, for PFT, 6MWD, ABG, Bronchoscopy consent and prep, Referal to Rheumatology.    This note was prepared using voice recognition system and is likely to have sound alike errors that may have been overlooked even after proof reading.  Please call me with any questions    Discussed diagnosis, its evaluation, treatment and usual course. All questions answered.    Thank you for the courtesy of participating in the care of this patient    Virgilio Coleman  MD

## 2019-08-08 NOTE — LETTER
August 8, 2019      Virgilio Weiss MD  [address]  [place] [address]           [facility]  [address]  [place] [address]  Phone: [phone]  Fax: [phone]          Patient: Daija Mistry   MR Number: [id number]   YOB: 1980   Date of Visit: 8/8/2019       Dear Dr. Virgilio Weiss:    Thank you for referring Daija Mistry to me for evaluation. Attached you will find relevant portions of my assessment and plan of care.    If you have questions, please do not hesitate to call me. I look forward to following Daija Mistry along with you.    Sincerely,    Bob Willis MD    Enclosure  CC:  Flores Richardson MD    If you would like to receive this communication electronically, please contact [email or web address] or [phone] to request more information on [employer] Link access.    For providers and/or their staff who would like to refer a patient to [facility], please contact us through our one-stop-shop provider referral line, [facility], at [phone].    If you feel you have received this communication in error or would no longer like to receive these types of communications, please e-mail [email or web address]

## 2019-08-08 NOTE — LETTER
August 8, 2019        Flores Richardson MD  [address]  [place] [address]             [facility]  [address]  [place] [address]  Phone: [phone]  Fax: [phone]   Patient: Daija Mistry   MR Number: [id number]   YOB: 1980   Date of Visit: 8/8/2019       Dear Dr. Richardson:    Thank you for referring Daija Mistry to me for evaluation. Attached you will find relevant portions of my assessment and plan of care.    If you have questions, please do not hesitate to call me. I look forward to following Daija Mistry along with you.    Sincerely,      Virgilio Weiss MD            CC  No Recipients    Enclosure

## 2019-08-08 NOTE — PATIENT INSTRUCTIONS
Cough, Chronic, Uncertain Cause (Adult)    Everyone has had a cough as part of the common cold, flu, or bronchitis. This kind of cough occurs along with an achy feeling, low-grade fever, nasal and sinus congestion, and a scratchy or sore throat. This usually gets better in 2 to 3 weeks. A cough that lasts longer than 3 weeks may be due to other causes.  If your cough does not improve over the next 2 weeks, further testing may be needed. Follow up with your healthcare provider as advised. Cough suppressants may be recommended. Based on your exam today, the exact cause of your cough is not certain. Below are some common causes for persistent cough.  Smokers cough  Smokers cough doesnt go away. If you continue to smoke, it only gets worse. The cough is from irritation in the air passages. Talk to your healthcare provider about quitting. Medicines or nicotine-replacement products, like gum or the patch, may make quitting easier.  Postnasal drip  A cough that is worse at night may be due to postnasal drip. Excess mucus in the nose drains from the back of your nose to your throat. This triggers the cough reflex. Postnasal drip may be due to a sinus infection or allergy. Common allergens include dust, tobacco smoke (both inhaled and secondhand smoke), environmental pollutants, pollen, mold, pets, cleaning agents, room deodorizers, and chemical fumes. Over-the-counter antihistamines or decongestants may be helpful for allergies. A sinus infection may requires antibiotic treatment. See your healthcare provider if symptoms continue.  Medicines  Certain prescribed medicines can cause a chronic cough in some people:  · ACE inhibitors for high blood pressure. These include benazepril, captopril, enalapril, fosinopril, lisinopril, quinapril, ramipril, and others.  · Beta-blockers for high blood pressure and other conditions. These include propranolol, atenolol, metoprolol, nadolol, and others.  Let your healthcare provider  know if you are taking any of these.  Asthma  Cough may be the only sign of mild asthma. You may have tests to find out if asthma is causing your cough. You may also take asthma medicine on a trial basis.  Acid reflux (heartburn, GERD)  The esophagus is the tube that carries food from the mouth to the stomach. A valve at its lower end prevents stomach acids from flowing upward. If this valve does not work properly, acid from the stomach enters the esophagus. This may cause a burning pain in the upper abdomen or lower chest, belching, or cough. Symptoms are often worse when lying flat. Avoid eating or drinking before bedtime. Try using extra pillows to raise your upper body, or place 4-inch blocks under the head of your bed. You may try an over-the-counter antacid or an acid-blocking medicine such as famotidine, cimetidine, ranitidine, esomeprazole, lansoprazole, or omeprazole. Stronger medicines for this condition can be prescribed by your healthcare provider.  Follow-up care  Follow up with your healthcare provider, or as advised, if your cough does not improve. Further testing may be needed.  Note: If an X-ray was taken, a specialist will review it. You will be notified of any new findings that may affect your care.  When to seek medical advice  Call your healthcare provider right away if any of these occur:  · Mild wheezing or difficulty breathing  · Fever of 100.4ºF (38ºC) or higher, or as directed by your healthcare provider  · Unexpected weight loss  · Coughing up large amounts of colored sputum  · Night sweats (sheets and pajamas get soaking wet)  Call 911, or get immediate medical care  Contact emergency services right away if any of these occur:  · Coughing up blood  · Moderate to severe trouble breathing or wheezing  Date Last Reviewed: 9/13/2015  © 0562-4083 [employer]. [address] [place] [address], [place] [address]. All rights reserved. This information is not intended as a substitute for  professional medical care. Always follow your healthcare professional's instructions.        Direct Laryngoscopy with Bronchoscopy    Laryngoscopy and bronchoscopy are 2 procedures that may be done together. These allow the healthcare provider to see inside the air passages in the throat and lungs. A laryngoscopy looks at the throat and vocal cords. Bronchoscopy looks at the trachea (windpipe) and lungs. These procedures can be used to diagnose and treat certain problems. They can also be used to remove stuck objects. A tissue sample may be taken for testing (biopsy). And certain problems, like cysts or scarring, can be treated. Your healthcare provider will tell you more about your procedure based on why it is being done. This sheet gives you general information about what to expect.  Preparing for the procedure  Prepare for the procedure as you have been instructed. Be sure to tell your healthcare provider about all medicines you take. This includes over-the-counter drugs. It also includes herbs and other supplements. You may need to stop taking some or all of them before surgery. Your healthcare provider will tell you what to stop. Also, follow any directions youre given for not eating or drinking before surgery.  The day of the procedure  The procedure takes 30 to 60 minutes. Before the procedure begins:  · An IV line is put into a vein in your arm or hand. This line delivers fluids and medicines.  · To keep you free of pain, you will be given anesthesia. This may be sedation, which makes you relaxed and drowsy. Local anesthesia may also be injected or sprayed into your throat to numb it. If you are in the hospital, you may be given general anesthesia. This puts you in a state like deep sleep through the procedure.  During the procedure  Here is what to expect during the procedure:  · A tube with a light and a camera called a scope is used. The tube may be flexible or rigid. If a flexible scope is used, it is  passed through your nostril. If the scope is rigid, it is put into your mouth and passed down into the throat.  · The scope is moved through the air passages to the lungs. The scope sends live images from inside the air passages to a video screen. This lets the healthcare provider examine problems more closely.  · If needed, a biopsy is done using small tools put through the scope.  · If a growth is found, tools (including a laser) can be put through the scope to remove it.  After the procedure  You will be taken to a room to recover from the anesthesia. You will receive pain medicine. Your throat may feel numb or scratchy. Swallowing may feel strange at first. This will improve within a few hours. When you are released to go home, have an adult family member or friend ready to drive you.  Recovering at home  Once home, follow any instructions you have been given. These include:  · Take pain medicine as directed.  · Do not eat or drink until swallowing returns to normal. As soon as you can swallow comfortably, drink plenty of water.  · Use throat lozenges as advised by your healthcare provider to help ease throat soreness.  · Rest your voice as instructed by your healthcare provider.  When to call your healthcare provider  After you get home, call the healthcare provider if you have any of the following:  · Chest pain or trouble breathing (call 911 or other emergency service)  · Fever of 100.4°F (38°C) or higher, or as directed by your healthcare provider  · Trouble swallowing doesnt improve or gets worse  · Pain that does not go away even after taking pain medicine  · Severely hoarse voice  · Severe nausea or vomiting  · Bloody vomit  · Cough that brings up more than tiny specks of blood   Follow-up  Within a few weeks, you will receive test results. Your healthcare provider will discuss these with you on the phone or during a follow-up visit. Depending on what was found, you may need further evaluation and  treatment.  Risks and possible complications  Risks of this procedure include:  · Bleeding  · Infection  · Swelling of the throat  · Nosebleed (if the scope is passed through the nostril)  · Gagging  · Vomiting  · Cuts in the mouth, nose, or throat  · Injury to the teeth  · Vocal cord injury  · Breathing problems  · Pneumothorax (collapsed lung)  · Perforation of the pharynx  · Risks of anesthesia    Date Last Reviewed: 6/11/2015  © 8516-7640 [employer]. [address] [place] [address]. All rights reserved. This information is not intended as a substitute for professional medical care. Always follow your healthcare professional's instructions.

## 2019-08-08 NOTE — ASSESSMENT & PLAN NOTE
Interstitial lung disease with ground-glass opacities and positive ANDERSON.  Possibly related to CTD related ILD.  Bronchoscopy scheduled for next week.  Continue prednisone in the meantime and once bronchoscopy ruled out infection, start CellCept

## 2019-08-09 LAB
ANA SER QL IF: POSITIVE
ANA TITR SER IF: NORMAL {TITER}
BRPFT: ABNORMAL
FEF 25 75 CHG: 58.8 %
FEF 25 75 LLN: 1.6
FEF 25 75 POST REF: 68.6 %
FEF 25 75 PRE REF: 43.2 %
FEF 25 75 REF: 2.93
FET100 CHG: 3.4 %
FEV1 CHG: 14.9 %
FEV1 FVC CHG: 4.9 %
FEV1 FVC LLN: 72
FEV1 FVC POST REF: 98.6 %
FEV1 FVC PRE REF: 94 %
FEV1 FVC REF: 83
FEV1 LLN: 2.11
FEV1 POST REF: 67 %
FEV1 PRE REF: 58.3 %
FEV1 REF: 2.72
FVC CHG: 9.6 %
FVC LLN: 2.57
FVC POST REF: 67.7 %
FVC PRE REF: 61.8 %
FVC REF: 3.29
IGE SERPL-ACNC: <35 IU/ML (ref 0–100)
PEF CHG: 10.6 %
PEF LLN: 4.76
PEF POST REF: 101.5 %
PEF PRE REF: 91.7 %
PEF REF: 6.83
POST FEF 25 75: 2.01 L/S (ref 1.6–4.26)
POST FET 100: 8.63 SEC
POST FEV1 FVC: 81.93 % (ref 72.33–93.89)
POST FEV1: 1.82 L (ref 2.11–3.33)
POST FVC: 2.23 L (ref 2.57–4)
POST PEF: 6.93 L/S (ref 4.76–8.89)
PRE FEF 25 75: 1.27 L/S (ref 1.6–4.26)
PRE FET 100: 8.35 SEC
PRE FEV1 FVC: 78.13 % (ref 72.33–93.89)
PRE FEV1: 1.59 L (ref 2.11–3.33)
PRE FVC: 2.03 L (ref 2.57–4)
PRE PEF: 6.26 L/S (ref 4.76–8.89)

## 2019-08-12 LAB
M TB IFN-G CD4+ BCKGRND COR BLD-ACNC: -0.02 IU/ML
MITOGEN IGNF BCKGRD COR BLD-ACNC: >10 IU/ML
MITOGEN IGNF BCKGRD COR BLD-ACNC: NEGATIVE [IU]/ML
NIL: 0.04 IU/ML
TB2 - NIL: -0.02 IU/ML

## 2019-08-13 LAB
A FUMIGATUS1 AB SER QL ID: NORMAL
A FUMIGATUS6 AB SER QL ID: NORMAL
A PULLULANS AB SER QL ID: NORMAL
ASPERGILLUS AB SER QL ID: NORMAL
B DERMAT AB SER QL ID: NORMAL
C IMMITIS AB SER QL ID: NORMAL
H CAPSUL AB SER QL ID: NORMAL
PIGEON SERUM AB QL ID: NORMAL
S RECTIVIRGULA AB SER QL ID: NORMAL
T VULGARIS1 AB SER QL ID: NORMAL

## 2019-08-14 LAB
ANTI SM ANTIBODY: 26.77 EU (ref 0–19.99)
ANTI SM/RNP ANTIBODY: 10.53 EU (ref 0–19.99)
ANTI SM/RNP ANTIBODY: 10.53 EU (ref 0–19.99)
ANTI-SM INTERPRETATION: POSITIVE
ANTI-SM/RNP INTERPRETATION: NEGATIVE
ANTI-SM/RNP INTERPRETATION: NEGATIVE
ANTI-SSA ANTIBODY: 6.16 EU (ref 0–19.99)
ANTI-SSA INTERPRETATION: NEGATIVE
ANTI-SSB ANTIBODY: 2.96 EU (ref 0–19.99)
ANTI-SSB INTERPRETATION: NEGATIVE
DSDNA AB SER-ACNC: ABNORMAL [IU]/ML

## 2019-08-15 ENCOUNTER — TELEPHONE (OUTPATIENT)
Dept: PULMONOLOGY | Facility: CLINIC | Age: 39
End: 2019-08-15

## 2019-08-16 ENCOUNTER — ANESTHESIA EVENT (OUTPATIENT)
Dept: ENDOSCOPY | Facility: HOSPITAL | Age: 39
End: 2019-08-16
Payer: COMMERCIAL

## 2019-08-16 ENCOUNTER — ANESTHESIA (OUTPATIENT)
Dept: ENDOSCOPY | Facility: HOSPITAL | Age: 39
End: 2019-08-16
Payer: COMMERCIAL

## 2019-08-16 ENCOUNTER — HOSPITAL ENCOUNTER (OUTPATIENT)
Facility: HOSPITAL | Age: 39
Discharge: HOME | End: 2019-08-16
Attending: INTERNAL MEDICINE | Admitting: INTERNAL MEDICINE
Payer: COMMERCIAL

## 2019-08-16 VITALS
SYSTOLIC BLOOD PRESSURE: 124 MMHG | OXYGEN SATURATION: 97 % | TEMPERATURE: 99 F | DIASTOLIC BLOOD PRESSURE: 86 MMHG | RESPIRATION RATE: 19 BRPM | HEART RATE: 96 BPM

## 2019-08-16 DIAGNOSIS — J84.9 ILD (INTERSTITIAL LUNG DISEASE): ICD-10-CM

## 2019-08-16 LAB
APPEARANCE FLD: NORMAL
APPEARANCE FLD: NORMAL
B-HCG UR QL: NEGATIVE
BODY FLD TYPE: NORMAL
BODY FLD TYPE: NORMAL
COLOR FLD: COLORLESS
COLOR FLD: COLORLESS
CTP QC/QA: YES
EOSINOPHIL NFR FLD MANUAL: 2 %
KOH PREP SPEC: NORMAL
LYMPHOCYTES NFR FLD MANUAL: 13 %
LYMPHOCYTES NFR FLD MANUAL: 6 %
MONOS+MACROS NFR FLD MANUAL: 86 %
MONOS+MACROS NFR FLD MANUAL: 92 %
NEUTROPHILS NFR FLD MANUAL: 1 %
PATH INTERP FLD-IMP: NORMAL
WBC # FLD: 54 /CU MM
WBC # FLD: 70 /CU MM

## 2019-08-16 PROCEDURE — 88305 TISSUE EXAM BY PATHOLOGIST: CPT | Mod: 26,,, | Performed by: PATHOLOGY

## 2019-08-16 PROCEDURE — 88312 SPECIAL STAINS GROUP 1: CPT | Performed by: PATHOLOGY

## 2019-08-16 PROCEDURE — 81025 URINE PREGNANCY TEST: CPT | Performed by: INTERNAL MEDICINE

## 2019-08-16 PROCEDURE — 87210 SMEAR WET MOUNT SALINE/INK: CPT | Mod: 91

## 2019-08-16 PROCEDURE — 88112 CYTOLOGY SPECIMEN- PULMONARY MEDICAL CYTOLOGY: ICD-10-PCS | Mod: 26,,, | Performed by: PATHOLOGY

## 2019-08-16 PROCEDURE — 88104 CYTOLOGY SPECIMEN- PULMONARY MEDICAL CYTOLOGY: ICD-10-PCS | Mod: 26,59,, | Performed by: PATHOLOGY

## 2019-08-16 PROCEDURE — 31628 PR BRONCHOSCOPY,TRANSBRONCH BIOPSY: ICD-10-PCS | Mod: LT,,, | Performed by: INTERNAL MEDICINE

## 2019-08-16 PROCEDURE — 88305 TISSUE EXAM BY PATHOLOGIST: CPT | Performed by: PATHOLOGY

## 2019-08-16 PROCEDURE — 63600175 PHARM REV CODE 636 W HCPCS: Performed by: INTERNAL MEDICINE

## 2019-08-16 PROCEDURE — 89051 BODY FLUID CELL COUNT: CPT

## 2019-08-16 PROCEDURE — 87077 CULTURE AEROBIC IDENTIFY: CPT

## 2019-08-16 PROCEDURE — 87186 SC STD MICRODIL/AGAR DIL: CPT | Mod: 59

## 2019-08-16 PROCEDURE — 87116 MYCOBACTERIA CULTURE: CPT

## 2019-08-16 PROCEDURE — 31632 BRONCHOSCOPY/LUNG BX ADDL: CPT | Performed by: INTERNAL MEDICINE

## 2019-08-16 PROCEDURE — 31628 BRONCHOSCOPY/LUNG BX EACH: CPT | Mod: RT | Performed by: INTERNAL MEDICINE

## 2019-08-16 PROCEDURE — 88305 CYTOLOGY SPECIMEN- PULMONARY MEDICAL CYTOLOGY: ICD-10-PCS | Mod: 26,,, | Performed by: PATHOLOGY

## 2019-08-16 PROCEDURE — 25000003 PHARM REV CODE 250: Performed by: INTERNAL MEDICINE

## 2019-08-16 PROCEDURE — 31632 PR BRONCHOSCOPY/LUNG BX, ADD'L: ICD-10-PCS | Mod: ,,, | Performed by: INTERNAL MEDICINE

## 2019-08-16 PROCEDURE — 63600175 PHARM REV CODE 636 W HCPCS: Performed by: NURSE ANESTHETIST, CERTIFIED REGISTERED

## 2019-08-16 PROCEDURE — 87102 FUNGUS ISOLATION CULTURE: CPT | Mod: 59

## 2019-08-16 PROCEDURE — 31623 DX BRONCHOSCOPE/BRUSH: CPT | Mod: 51,RT | Performed by: INTERNAL MEDICINE

## 2019-08-16 PROCEDURE — 88104 CYTOPATH FL NONGYN SMEARS: CPT | Mod: 26,59,, | Performed by: PATHOLOGY

## 2019-08-16 PROCEDURE — 87206 SMEAR FLUORESCENT/ACID STAI: CPT | Mod: 91

## 2019-08-16 PROCEDURE — 88312 SPECIAL STAINS GROUP 1: CPT | Mod: 26,,, | Performed by: PATHOLOGY

## 2019-08-16 PROCEDURE — 87015 SPECIMEN INFECT AGNT CONCNTJ: CPT | Mod: 59

## 2019-08-16 PROCEDURE — 37000008 HC ANESTHESIA 1ST 15 MINUTES: Performed by: INTERNAL MEDICINE

## 2019-08-16 PROCEDURE — 88112 CYTOPATH CELL ENHANCE TECH: CPT | Mod: 26,,, | Performed by: PATHOLOGY

## 2019-08-16 PROCEDURE — 31632 BRONCHOSCOPY/LUNG BX ADDL: CPT | Mod: ,,, | Performed by: INTERNAL MEDICINE

## 2019-08-16 PROCEDURE — 37000009 HC ANESTHESIA EA ADD 15 MINS: Performed by: INTERNAL MEDICINE

## 2019-08-16 PROCEDURE — 31623 PR BRONCHOSCOPY,DIAGNOSTIC W BRUSH: ICD-10-PCS | Mod: 51,LT,, | Performed by: INTERNAL MEDICINE

## 2019-08-16 PROCEDURE — 88312 CYTOLOGY SPECIMEN- PULMONARY MEDICAL CYTOLOGY: ICD-10-PCS | Mod: 26,,, | Performed by: PATHOLOGY

## 2019-08-16 PROCEDURE — 87205 SMEAR GRAM STAIN: CPT

## 2019-08-16 PROCEDURE — 27200944 HC BRONCH FORCEPS DISPOSABLE: Performed by: INTERNAL MEDICINE

## 2019-08-16 PROCEDURE — 31624 DX BRONCHOSCOPE/LAVAGE: CPT | Mod: 59,RT,, | Performed by: INTERNAL MEDICINE

## 2019-08-16 PROCEDURE — 31624 PR BRONCHOSCOPY,DIAG2STIC W LAVAGE: ICD-10-PCS | Mod: 59,RT,, | Performed by: INTERNAL MEDICINE

## 2019-08-16 PROCEDURE — 25000242 PHARM REV CODE 250 ALT 637 W/ HCPCS: Performed by: INTERNAL MEDICINE

## 2019-08-16 PROCEDURE — 87070 CULTURE OTHR SPECIMN AEROBIC: CPT | Mod: 59

## 2019-08-16 PROCEDURE — 31624 DX BRONCHOSCOPE/LAVAGE: CPT | Mod: 51,LT | Performed by: INTERNAL MEDICINE

## 2019-08-16 PROCEDURE — 25000003 PHARM REV CODE 250: Performed by: NURSE ANESTHETIST, CERTIFIED REGISTERED

## 2019-08-16 PROCEDURE — 31623 DX BRONCHOSCOPE/BRUSH: CPT | Mod: 51,LT,, | Performed by: INTERNAL MEDICINE

## 2019-08-16 PROCEDURE — 31628 BRONCHOSCOPY/LUNG BX EACH: CPT | Mod: LT,,, | Performed by: INTERNAL MEDICINE

## 2019-08-16 RX ORDER — IPRATROPIUM BROMIDE AND ALBUTEROL SULFATE 2.5; .5 MG/3ML; MG/3ML
3 SOLUTION RESPIRATORY (INHALATION) ONCE
Status: COMPLETED | OUTPATIENT
Start: 2019-08-16 | End: 2019-08-16

## 2019-08-16 RX ORDER — LIDOCAINE HYDROCHLORIDE 10 MG/ML
INJECTION, SOLUTION EPIDURAL; INFILTRATION; INTRACAUDAL; PERINEURAL
Status: DISCONTINUED | OUTPATIENT
Start: 2019-08-16 | End: 2019-08-16

## 2019-08-16 RX ORDER — ROCURONIUM BROMIDE 10 MG/ML
INJECTION, SOLUTION INTRAVENOUS
Status: DISCONTINUED | OUTPATIENT
Start: 2019-08-16 | End: 2019-08-16

## 2019-08-16 RX ORDER — LIDOCAINE HYDROCHLORIDE 40 MG/ML
SOLUTION TOPICAL
Status: COMPLETED | OUTPATIENT
Start: 2019-08-16 | End: 2019-08-16

## 2019-08-16 RX ORDER — PROPOFOL 10 MG/ML
VIAL (ML) INTRAVENOUS
Status: DISCONTINUED | OUTPATIENT
Start: 2019-08-16 | End: 2019-08-16

## 2019-08-16 RX ORDER — FENTANYL CITRATE 50 UG/ML
INJECTION, SOLUTION INTRAMUSCULAR; INTRAVENOUS
Status: DISCONTINUED | OUTPATIENT
Start: 2019-08-16 | End: 2019-08-16

## 2019-08-16 RX ORDER — SODIUM CHLORIDE, SODIUM LACTATE, POTASSIUM CHLORIDE, CALCIUM CHLORIDE 600; 310; 30; 20 MG/100ML; MG/100ML; MG/100ML; MG/100ML
INJECTION, SOLUTION INTRAVENOUS CONTINUOUS
Status: DISCONTINUED | OUTPATIENT
Start: 2019-08-16 | End: 2019-08-16 | Stop reason: HOSPADM

## 2019-08-16 RX ORDER — SUCCINYLCHOLINE CHLORIDE 20 MG/ML
INJECTION INTRAMUSCULAR; INTRAVENOUS
Status: DISCONTINUED | OUTPATIENT
Start: 2019-08-16 | End: 2019-08-16

## 2019-08-16 RX ORDER — ONDANSETRON 2 MG/ML
INJECTION INTRAMUSCULAR; INTRAVENOUS
Status: DISCONTINUED | OUTPATIENT
Start: 2019-08-16 | End: 2019-08-16

## 2019-08-16 RX ADMIN — LIDOCAINE HYDROCHLORIDE 2 ML: 40 SOLUTION TOPICAL at 07:08

## 2019-08-16 RX ADMIN — FENTANYL CITRATE 50 MCG: 50 INJECTION, SOLUTION INTRAMUSCULAR; INTRAVENOUS at 07:08

## 2019-08-16 RX ADMIN — PROPOFOL 200 MG: 10 INJECTION, EMULSION INTRAVENOUS at 07:08

## 2019-08-16 RX ADMIN — SODIUM CHLORIDE, SODIUM LACTATE, POTASSIUM CHLORIDE, AND CALCIUM CHLORIDE: 600; 310; 30; 20 INJECTION, SOLUTION INTRAVENOUS at 07:08

## 2019-08-16 RX ADMIN — SUCCINYLCHOLINE CHLORIDE 100 MG: 20 INJECTION, SOLUTION INTRAMUSCULAR; INTRAVENOUS at 07:08

## 2019-08-16 RX ADMIN — FENTANYL CITRATE 100 MCG: 50 INJECTION, SOLUTION INTRAMUSCULAR; INTRAVENOUS at 07:08

## 2019-08-16 RX ADMIN — LIDOCAINE HYDROCHLORIDE 50 MG: 10 INJECTION, SOLUTION EPIDURAL; INFILTRATION; INTRACAUDAL; PERINEURAL at 07:08

## 2019-08-16 RX ADMIN — ROCURONIUM BROMIDE 5 MG: 10 INJECTION, SOLUTION INTRAVENOUS at 07:08

## 2019-08-16 RX ADMIN — SODIUM CHLORIDE, SODIUM LACTATE, POTASSIUM CHLORIDE, AND CALCIUM CHLORIDE: 600; 310; 30; 20 INJECTION, SOLUTION INTRAVENOUS at 06:08

## 2019-08-16 RX ADMIN — IPRATROPIUM BROMIDE AND ALBUTEROL SULFATE 3 ML: .5; 3 SOLUTION RESPIRATORY (INHALATION) at 08:08

## 2019-08-16 RX ADMIN — ONDANSETRON 4 MG: 2 INJECTION, SOLUTION INTRAMUSCULAR; INTRAVENOUS at 07:08

## 2019-08-16 NOTE — ANESTHESIA PREPROCEDURE EVALUATION
08/16/2019  Daija Luna is a 39 y.o., female.    Pre-op Assessment    I have reviewed the Patient Summary Reports.    I have reviewed the Nursing Notes.   I have reviewed the Medications.     Review of Systems  Anesthesia Hx:  No problems with previous Anesthesia  Neg history of prior surgery. Denies Family Hx of Anesthesia complications.   Denies Personal Hx of Anesthesia complications.   Social:  No Alcohol Use, Non-Smoker    Hematology/Oncology:  Hematology Normal   Oncology Normal     EENT/Dental:EENT/Dental Normal   Cardiovascular:   Hypertension NYHA Classification I ECG has been reviewed.    Pulmonary:   Asthma    Renal/:  Renal/ Normal     Hepatic/GI:  Hepatic/GI Normal    Musculoskeletal:  Musculoskeletal Normal    Neurological:   TIA,    Endocrine:   Hypothyroidism    Dermatological:  Skin Normal    Psych:   Psychiatric History          Physical Exam  General:  Obesity    Airway/Jaw/Neck:  AIRWAY FINDINGS: Normal      Eyes/Ears/Nose:  EYES/EARS/NOSE FINDINGS: Normal   Dental:  DENTAL FINDINGS: Normal   Chest/Lungs:  Chest/Lungs Clear    Heart/Vascular:  Heart Findings: Normal Heart murmur: negative Vascular Findings: Normal    Abdomen:  Abdomen Findings: Normal    Musculoskeletal:  Musculoskeletal Findings: Normal   Skin:  Skin Findings: Normal    Mental Status:  Mental Status Findings: Normal        Anesthesia Plan  Type of Anesthesia, risks & benefits discussed:  Anesthesia Type:  general  Patient's Preference:   Intra-op Monitoring Plan: standard ASA monitors  Intra-op Monitoring Plan Comments:   Post Op Pain Control Plan: IV/PO Opioids PRN  Post Op Pain Control Plan Comments:   Induction:   IV  Beta Blocker:  Patient is not currently on a Beta-Blocker (No further documentation required).       Informed Consent: Patient understands risks and agrees with Anesthesia plan.  Questions  answered. Anesthesia consent signed with patient.  ASA Score: 2     Day of Surgery Review of History & Physical: I have interviewed and examined the patient. I have reviewed the patient's H&P dated:  There are no significant changes.  H&P update referred to the surgeon.  H&P completed by Anesthesiologist.

## 2019-08-16 NOTE — OP NOTE
Bronchoscopy performed  Intubated 7.5 ETT  Normal airway  BAL: RUL, AYLIN  Micro Brush and cyto Brush AYLIN  TBBX: AYLIN, LLL,RLL    CXR post procedure NO pneumothorax

## 2019-08-16 NOTE — DISCHARGE SUMMARY
[facility] -   Discharge Summary     Patient ID:  Daija Luna  [id number]  39 y.o.  1980    Admit date: 8/16/2019    Discharge Date and Time:  08/16/2019 8:13 AM    Admitting Physician: Virgilio Weiss MD     Discharge Provider: Virgilio Weiss    Reason for Admission: Organized pneumonia [J84.89]  ANDERSON positive [R76.8]  Anti-RNP antibodies present [R76.8]  Chronic cough [R05]    Admission Condition: good    Procedures Performed: Procedure(s) (LRB):  Bronchoscopy (Bilateral)    Hospital Course (synopsis of major diagnoses, care, treatment, and services provided during the course of the hospital stay):      Bronchoscopy performed  Intubated 7.5 ETT  Normal airway  BAL: RUL, AYLIN  Micro Brush and cyto Brush AYLIN  TBBX: AYLIN, LLL,RLL    CXR post procedure NO pneumothorax     Consults: None    Significant Diagnostic Studies: bronchoscopy:      Final Diagnoses:    Principal Problem: ILD (interstitial lung disease)   Secondary Diagnoses:   Active Hospital Problems    Diagnosis  POA    *ILD (interstitial lung disease) [J84.9]  Yes     pulmonary referral for ?sarcoidosis, lupus like syndrome      Restrictive lung disease [J98.4]  Yes    Positive ANDERSON (antinuclear antibody) [R76.8]  Yes      Resolved Hospital Problems   No resolved problems to display.       Discharged Condition: stable    Discharge Exam:  /88 (BP Location: Left arm, Patient Position: Lying)   Pulse 99   Temp 99 °F (37.2 °C) (Tympanic)   Resp 19   LMP 08/09/2019   SpO2 96% Comment: 2 liter nasal cannula  Breastfeeding? No   Lungs: clear to auscultation bilaterally    Disposition: Final discharge disposition not confirmed    Follow Up/Patient Instructions:     Medications:  Reconciled Home Medications:      Medication List      ASK your doctor about these medications    albuterol 90 mcg/actuation inhaler  Commonly known as:  PROVENTIL/VENTOLIN HFA  Inhale 2 puffs into the lungs every 6 (six) hours as needed for  Wheezing.     dulaglutide 1.5 mg/0.5 mL Pnij  Commonly known as:  TRULICITY  Inject 1.5 mg into the skin every 7 days.     fluocinolone 0.01 % external oil  Commonly known as:  DERMA-SMOOTHE  Apply oil to scalp twice a day     fluticasone propionate 50 mcg/actuation nasal spray  Commonly known as:  FLONASE  2 sprays (100 mcg total) by Each Nare route once daily.     ketoconazole 2 % shampoo  Commonly known as:  NIZORAL  Wash hair with medicated shampoo at least 2x/week - let sit on scalp at least 5 minutes prior to rinsing     levothyroxine 88 MCG tablet  Commonly known as:  SYNTHROID  Take 1 tablet (88 mcg total) by mouth once daily.     predniSONE 20 MG tablet  Commonly known as:  DELTASONE  Take 1 tablet (20 mg total) by mouth once daily.     QUEtiapine 50 MG tablet  Commonly known as:  SEROQUEL  Take 1 tablet (50 mg total) by mouth nightly.     sulfamethoxazole-trimethoprim 800-160mg 800-160 mg Tab  Commonly known as:  BACTRIM DS  Take 2 tablets by mouth every Mon, Wed, Fri.     triamterene-hydrochlorothiazide 37.5-25 mg 37.5-25 mg per tablet  Commonly known as:  MAXZIDE-25  Take 1 tablet by mouth once daily.          No discharge procedures on file.    Activity: activity as tolerated and no driving for today  Diet: clear liquids, advance as tolerated  Wound Care: none needed    Follow-up with ME in 4 week.    Signed:  Virgilio Weiss  8/16/2019  8:13 AM

## 2019-08-16 NOTE — ANESTHESIA PROCEDURE NOTES
Intubation    Diagnosis: asthma, bronch  Patient location during procedure: done in OR  Procedure start time: 8/16/2019 7:12 AM  Timeout: 8/16/2019 7:11 AM  Procedure end time: 8/16/2019 7:12 AM    Staffing  Authorizing Provider: Sahil Bustillos MD  Performing Provider: Luis Angel Leigh CRNA    Anesthesiologist was present at the time of the procedure.  Preanesthetic Checklist  Completed: patient identified, pre-op evaluation, timeout performed, IV checked, risks and benefits discussed, monitors and equipment checked and anesthesia consent given  Intubation  Indication: airway protection, surgery  Pre-oxygenation. Induction: intravenous, mask ventilation: easy mask.  Intubation: postinduction, Bearden 2.  Endotracheal Tube: oral, 7.5 mm ID, cuffed (inflated to minimal occlusive pressure)  Attempts: 1, Grade I - full view of cords  Complicating Factors: none  Tube secured at 21 cm at the lips.  Findings post-intubation: bilateral breath sounds, positive ETCO2, atraumatic / condition of teeth unchanged  Position Confirmation: auscultation, fiberoptic bronchoscopic inspection  Eye Care: taped closed, laser protective goggles / glasses placed

## 2019-08-16 NOTE — ANESTHESIA POSTPROCEDURE EVALUATION
Anesthesia Post Evaluation    Patient: Daija Luna    Procedure(s) Performed: Procedure(s) (LRB):  Bronchoscopy (Bilateral)    Final Anesthesia Type: general  Patient location during evaluation: PACU  Patient participation: Yes- Able to Participate  Level of consciousness: awake and alert and oriented  Post-procedure vital signs: reviewed and stable  Pain management: adequate  Airway patency: patent  PONV status at discharge: No PONV  Anesthetic complications: no      Cardiovascular status: stable  Respiratory status: unassisted, spontaneous ventilation and room air  Hydration status: euvolemic  Follow-up not needed.          Vitals Value Taken Time   /88 8/16/2019  7:47 AM   Temp 37.2 °C (99 °F) 8/16/2019  7:47 AM   Pulse 100 8/16/2019  7:47 AM   Resp 22 8/16/2019  7:47 AM   SpO2 99 % 8/16/2019  7:47 AM         No case tracking events are documented in the log.      Pain/Junie Score: Junie Score: 9 (8/16/2019  7:47 AM)

## 2019-08-16 NOTE — PLAN OF CARE
Dr. Weiss  At bedside; findings discussed with patient and family. Vital signs stable. Order for one time duoneb ordered; post procedure CXR OK per Dr Weiss; no pneumothorax

## 2019-08-16 NOTE — ANESTHESIA RELEASE NOTE
Anesthesia Release from PACU Note    Patient: Daija Luna    Procedure(s) Performed: Procedure(s) (LRB):  Bronchoscopy (Bilateral)    Anesthesia type: general    Post pain: Adequate analgesia    Post assessment: no apparent anesthetic complications    Last Vitals:   Visit Vitals  BP (!) 140/88 (BP Location: Left arm, Patient Position: Lying)   Pulse 100   Temp 37.2 °C (99 °F) (Tympanic)   Resp (!) 22   LMP 08/09/2019   SpO2 99%   Breastfeeding? No       Post vital signs: stable    Level of consciousness: awake    Nausea/Vomiting: no nausea/no vomiting    Complications: none    Airway Patency: patent    Respiratory: unassisted    Cardiovascular: stable and blood pressure at baseline    Hydration: euvolemic

## 2019-08-16 NOTE — TRANSFER OF CARE
Anesthesia Transfer of Care Note    Patient: Daija Luna    Procedure(s) Performed: Procedure(s) (LRB):  Bronchoscopy (Bilateral)    Patient location: PACU    Transport from OR: Transported from OR on room air with adequate spontaneous ventilation    Post pain: adequate analgesia    Post assessment: no apparent anesthetic complications and tolerated procedure well    Post vital signs: stable    Level of consciousness: awake, alert and oriented    Nausea/Vomiting: no nausea/vomiting    Complications: none    Transfer of care protocol was followed      Last vitals:   Visit Vitals  BP (!) 140/88 (BP Location: Left arm, Patient Position: Lying)   Pulse 100   Temp 37.2 °C (99 °F) (Tympanic)   Resp (!) 22   LMP 08/09/2019   SpO2 99%   Breastfeeding? No

## 2019-08-19 LAB
BACTERIA SPEC AEROBE CULT: NO GROWTH
BACTERIA SPEC AEROBE CULT: NORMAL
GRAM STN SPEC: NORMAL

## 2019-08-21 ENCOUNTER — PATIENT MESSAGE (OUTPATIENT)
Dept: SLEEP MEDICINE | Facility: CLINIC | Age: 39
End: 2019-08-21

## 2019-08-21 ENCOUNTER — PATIENT MESSAGE (OUTPATIENT)
Dept: RHEUMATOLOGY | Facility: CLINIC | Age: 39
End: 2019-08-21

## 2019-08-21 DIAGNOSIS — A49.8 INFECTION DUE TO STENOTROPHOMONAS MALTOPHILIA: Primary | ICD-10-CM

## 2019-08-21 LAB
BACTERIA SPEC AEROBE CULT: ABNORMAL
BACTERIA SPEC AEROBE CULT: ABNORMAL
GRAM STN SPEC: ABNORMAL

## 2019-08-21 RX ORDER — SULFAMETHOXAZOLE AND TRIMETHOPRIM 800; 160 MG/1; MG/1
1 TABLET ORAL 2 TIMES DAILY
Qty: 28 TABLET | Refills: 0 | Status: SHIPPED | OUTPATIENT
Start: 2019-08-21 | End: 2019-09-05 | Stop reason: SDUPTHER

## 2019-08-21 NOTE — PROGRESS NOTES
Gross Description  1.) 2 slides + 1 brush tip received.  1 slide used for gms.  1 slide + 1 gms.  2.) 5mls of cloudy red fluid.  thinprep, cell block:b  SPECIMEN  1) AYLIN cyto brushing set #1 fixed with brush  2) Collective Wash  FINAL PATHOLOGIC DIAGNOSIS  1. Bronchial brush, left upper lobe:  Negative for malignant cells.  Benign bronchial cells and macrophages.  GMS stain is negative for Pneumocystis and other fungal organisms (satisfactory control material).  2. Bronchial wash (collective):  Negative for malignant cells.  Benign bronchial cells and macrophages.  [facility]  Diagnosed by: Ronal Mcintyre M.D.  (Electronically Signed: 2019-08-21 09:43:21)

## 2019-08-21 NOTE — PROGRESS NOTES
Only fibrosis seen on Biopsy  Stenothromomonas will be treated with Bactrim  Virgilio Weiss MD  ORDERING PHYSICIAN(S)  VIRGILIO WEISS  PreOperative Diagnosis  PostOperative Diagnosis  jm  SPECIMEN  1) Left upper lobe biopsy.  2) Left lower lobe biopsy.  3) Right lower lobe biopsy.  FINAL PATHOLOGIC DIAGNOSIS  1. Lung, left upper lobe, biopsy:  Benign respiratory mucosa with moderate interstitial fibrosis.  Negative for significant inflammation, granulomas and malignancy.  2. Lung, left lower lobe, biopsy:  Benign respiratory mucosa with focal moderate fibrosis.  Negative for significant inflammation, granulomas and malignancy.  3. Lung, right lower lobe, biopsy:  Scant fragments of superficial benign bronchial mucosa.  Negative for significant inflammation, granulomas and malignancy.  [facility]  Diagnosed by: Ronal Mcintyre M.D.  (Electronically Signed: 2019-08-21 10:55:26)  Gross Description  1. ID/AP Label: [id number], designated left upper lobe biopsy  Received in formalin labeled with the patient's name and medical record number are 3 tan-red fragments of soft  tissue (up to 0.2 cm). The specimen is filtered through a biopsy bag and submitted entirely into cassette  [id number].  2. ID/AP Label: [id number], designated left lower lobe biopsy  Received in formalin labeled with patient's name medical record number are 2 fragments of white-tan soft tissue

## 2019-08-21 NOTE — PROGRESS NOTES
STENOTROPHOMONAS (X.) MALTOPHILIA   Rare   Normal respiratory radha also present   P       Gram Stain (Respiratory) <10 epithelial cells per low power field.    Gram Stain (Respiratory) Rare WBC's    Gram Stain (Respiratory) No organisms seen    Resulting Agency [facility]   Susceptibility      Stenotrophomonas (X.) Maltophilia     CULTURE, RESPIRATORY (Preliminary)     Ceftazidime 16 mcg/mL Intermediate     Trimeth/Sulfa <=2/38 mcg/mL Sensitive         Already on bactrim

## 2019-08-22 ENCOUNTER — TELEPHONE (OUTPATIENT)
Dept: PULMONOLOGY | Facility: CLINIC | Age: 39
End: 2019-08-22

## 2019-08-22 NOTE — TELEPHONE ENCOUNTER
----- Message from Virgilio Weiss MD sent at 8/22/2019  6:25 AM CDT -----  Regarding: RE:Reminder for Upcoming Procedure  Contact: [phone]  Bactrim 1 DS BID x 14 days sent  Then continue Bactrim MWF    Virgilio Weiss MD    ----- Message -----  From: Fatou Grimm LPN  Sent: 8/21/2019   5:51 PM  To: Virgilio Weiss MD  Subject: FW:Reminder for Upcoming Procedure                   ----- Message -----  From: Daija Luna  Sent: 8/21/2019   9:29 AM  To: [facility] Staff  Subject: RE:Reminder for Upcoming Procedure               Hey. The Rx is Bactrim DS 2 pills po MWF, is this effective for the STENOTROPHOMONAS (X.) MALTOPHILIA. And if so how long?    ----- Message -----  From: Virgilio Weiss MD  Sent: 8/9/2019  8:30 AM CDT  To: Daija Luna  Subject: Reminder for Upcoming Procedure  [facility] [address] [place] [id number]    08/09/2019      Dear Daija,      This is a reminder for your upcoming procedure with Virgilio Weiss MD on 8/16/2019. We will contact you again before the day of your procedure with your scheduled arrival time unless you have already received this information from your physicians office.         If you have questions or scheduling concerns, you can contact your physicians office:   Virgilio Weiss MD  Phone Number: [phone]      Sincerely,     [facility] [address] [place] [id number]

## 2019-08-23 PROBLEM — A53.9 SYPHILIS: Status: RESOLVED | Noted: 2018-02-02 | Resolved: 2019-08-23

## 2019-09-04 ENCOUNTER — TELEPHONE (OUTPATIENT)
Dept: RHEUMATOLOGY | Facility: CLINIC | Age: 39
End: 2019-09-04

## 2019-09-05 ENCOUNTER — LAB VISIT (OUTPATIENT)
Dept: LAB | Facility: HOSPITAL | Age: 39
End: 2019-09-05
Attending: INTERNAL MEDICINE
Payer: COMMERCIAL

## 2019-09-05 ENCOUNTER — OFFICE VISIT (OUTPATIENT)
Dept: RHEUMATOLOGY | Facility: CLINIC | Age: 39
End: 2019-09-05
Payer: COMMERCIAL

## 2019-09-05 ENCOUNTER — TELEPHONE (OUTPATIENT)
Dept: PHARMACY | Facility: CLINIC | Age: 39
End: 2019-09-05

## 2019-09-05 ENCOUNTER — LAB VISIT (OUTPATIENT)
Dept: LAB | Facility: HOSPITAL | Age: 39
End: 2019-09-05
Payer: COMMERCIAL

## 2019-09-05 VITALS
HEART RATE: 99 BPM | BODY MASS INDEX: 43.96 KG/M2 | WEIGHT: 263.88 LBS | SYSTOLIC BLOOD PRESSURE: 123 MMHG | HEIGHT: 65 IN | DIASTOLIC BLOOD PRESSURE: 76 MMHG

## 2019-09-05 DIAGNOSIS — J84.9 ILD (INTERSTITIAL LUNG DISEASE): Primary | ICD-10-CM

## 2019-09-05 DIAGNOSIS — R05.3 CHRONIC COUGH: ICD-10-CM

## 2019-09-05 DIAGNOSIS — R76.8 ANTI-RNP ANTIBODIES PRESENT: ICD-10-CM

## 2019-09-05 DIAGNOSIS — J84.9 ILD (INTERSTITIAL LUNG DISEASE): ICD-10-CM

## 2019-09-05 DIAGNOSIS — M32.13 OTHER SYSTEMIC LUPUS ERYTHEMATOSUS WITH LUNG INVOLVEMENT: ICD-10-CM

## 2019-09-05 DIAGNOSIS — J84.89 ORGANIZED PNEUMONIA: ICD-10-CM

## 2019-09-05 DIAGNOSIS — R76.8 ANA POSITIVE: ICD-10-CM

## 2019-09-05 LAB
ALBUMIN SERPL BCP-MCNC: 3.4 G/DL (ref 3.5–5.2)
ALP SERPL-CCNC: 74 U/L (ref 55–135)
ALT SERPL W/O P-5'-P-CCNC: 12 U/L (ref 10–44)
ANION GAP SERPL CALC-SCNC: 11 MMOL/L (ref 8–16)
AST SERPL-CCNC: 19 U/L (ref 10–40)
BASOPHILS # BLD AUTO: 0.05 K/UL (ref 0–0.2)
BASOPHILS NFR BLD: 0.3 % (ref 0–1.9)
BILIRUB SERPL-MCNC: 0.2 MG/DL (ref 0.1–1)
BILIRUB UR QL STRIP: NEGATIVE
BUN SERPL-MCNC: 12 MG/DL (ref 6–20)
C3 SERPL-MCNC: 163 MG/DL (ref 50–180)
C4 SERPL-MCNC: 38 MG/DL (ref 11–44)
CALCIUM SERPL-MCNC: 9.2 MG/DL (ref 8.7–10.5)
CHLORIDE SERPL-SCNC: 99 MMOL/L (ref 95–110)
CK SERPL-CCNC: 131 U/L (ref 20–180)
CLARITY UR: CLEAR
CO2 SERPL-SCNC: 28 MMOL/L (ref 23–29)
COLOR UR: YELLOW
CREAT SERPL-MCNC: 1.2 MG/DL (ref 0.5–1.4)
CREAT UR-MCNC: 231 MG/DL (ref 15–325)
CRP SERPL-MCNC: 19 MG/L (ref 0–8.2)
DIFFERENTIAL METHOD: ABNORMAL
EOSINOPHIL # BLD AUTO: 0.1 K/UL (ref 0–0.5)
EOSINOPHIL NFR BLD: 0.8 % (ref 0–8)
ERYTHROCYTE [DISTWIDTH] IN BLOOD BY AUTOMATED COUNT: 19.1 % (ref 11.5–14.5)
ERYTHROCYTE [SEDIMENTATION RATE] IN BLOOD BY WESTERGREN METHOD: 46 MM/HR (ref 0–36)
EST. GFR  (AFRICAN AMERICAN): >60 ML/MIN/1.73 M^2
EST. GFR  (NON AFRICAN AMERICAN): 57 ML/MIN/1.73 M^2
GLUCOSE SERPL-MCNC: 91 MG/DL (ref 70–110)
GLUCOSE UR QL STRIP: NEGATIVE
HCT VFR BLD AUTO: 36.7 % (ref 37–48.5)
HGB BLD-MCNC: 11.4 G/DL (ref 12–16)
HGB UR QL STRIP: ABNORMAL
IMM GRANULOCYTES # BLD AUTO: 0.09 K/UL (ref 0–0.04)
IMM GRANULOCYTES NFR BLD AUTO: 0.6 % (ref 0–0.5)
KETONES UR QL STRIP: NEGATIVE
LEUKOCYTE ESTERASE UR QL STRIP: NEGATIVE
LYMPHOCYTES # BLD AUTO: 4.5 K/UL (ref 1–4.8)
LYMPHOCYTES NFR BLD: 31 % (ref 18–48)
MCH RBC QN AUTO: 25.3 PG (ref 27–31)
MCHC RBC AUTO-ENTMCNC: 31.1 G/DL (ref 32–36)
MCV RBC AUTO: 81 FL (ref 82–98)
MONOCYTES # BLD AUTO: 1.1 K/UL (ref 0.3–1)
MONOCYTES NFR BLD: 7.6 % (ref 4–15)
NEUTROPHILS # BLD AUTO: 8.7 K/UL (ref 1.8–7.7)
NEUTROPHILS NFR BLD: 60.3 % (ref 38–73)
NITRITE UR QL STRIP: NEGATIVE
NRBC BLD-RTO: 0 /100 WBC
PH UR STRIP: 6 [PH] (ref 5–8)
PLATELET # BLD AUTO: 356 K/UL (ref 150–350)
PMV BLD AUTO: 10.2 FL (ref 9.2–12.9)
POTASSIUM SERPL-SCNC: 3.3 MMOL/L (ref 3.5–5.1)
PROT SERPL-MCNC: 7.9 G/DL (ref 6–8.4)
PROT UR QL STRIP: NEGATIVE
PROT UR-MCNC: 14 MG/DL (ref 0–15)
PROT/CREAT UR: 0.06 MG/G{CREAT} (ref 0–0.2)
RBC # BLD AUTO: 4.51 M/UL (ref 4–5.4)
SODIUM SERPL-SCNC: 138 MMOL/L (ref 136–145)
SP GR UR STRIP: 1.02 (ref 1–1.03)
URN SPEC COLLECT METH UR: ABNORMAL
WBC # BLD AUTO: 14.37 K/UL (ref 3.9–12.7)

## 2019-09-05 PROCEDURE — 3008F PR BODY MASS INDEX (BMI) DOCUMENTED: ICD-10-PCS | Mod: CPTII,S$GLB,, | Performed by: INTERNAL MEDICINE

## 2019-09-05 PROCEDURE — 99214 OFFICE O/P EST MOD 30 MIN: CPT | Mod: S$GLB,,, | Performed by: INTERNAL MEDICINE

## 2019-09-05 PROCEDURE — 85025 COMPLETE CBC W/AUTO DIFF WBC: CPT

## 2019-09-05 PROCEDURE — 3074F PR MOST RECENT SYSTOLIC BLOOD PRESSURE < 130 MM HG: ICD-10-PCS | Mod: CPTII,S$GLB,, | Performed by: INTERNAL MEDICINE

## 2019-09-05 PROCEDURE — 36415 COLL VENOUS BLD VENIPUNCTURE: CPT

## 2019-09-05 PROCEDURE — 99214 PR OFFICE/OUTPT VISIT, EST, LEVL IV, 30-39 MIN: ICD-10-PCS | Mod: S$GLB,,, | Performed by: INTERNAL MEDICINE

## 2019-09-05 PROCEDURE — 99999 PR PBB SHADOW E&M-EST. PATIENT-LVL III: CPT | Mod: PBBFAC,,, | Performed by: INTERNAL MEDICINE

## 2019-09-05 PROCEDURE — 3078F PR MOST RECENT DIASTOLIC BLOOD PRESSURE < 80 MM HG: ICD-10-PCS | Mod: CPTII,S$GLB,, | Performed by: INTERNAL MEDICINE

## 2019-09-05 PROCEDURE — 86140 C-REACTIVE PROTEIN: CPT

## 2019-09-05 PROCEDURE — 3074F SYST BP LT 130 MM HG: CPT | Mod: CPTII,S$GLB,, | Performed by: INTERNAL MEDICINE

## 2019-09-05 PROCEDURE — 82550 ASSAY OF CK (CPK): CPT

## 2019-09-05 PROCEDURE — 85652 RBC SED RATE AUTOMATED: CPT

## 2019-09-05 PROCEDURE — 80053 COMPREHEN METABOLIC PANEL: CPT

## 2019-09-05 PROCEDURE — 99999 PR PBB SHADOW E&M-EST. PATIENT-LVL III: ICD-10-PCS | Mod: PBBFAC,,, | Performed by: INTERNAL MEDICINE

## 2019-09-05 PROCEDURE — 86160 COMPLEMENT ANTIGEN: CPT | Mod: 59

## 2019-09-05 PROCEDURE — 3078F DIAST BP <80 MM HG: CPT | Mod: CPTII,S$GLB,, | Performed by: INTERNAL MEDICINE

## 2019-09-05 PROCEDURE — 82085 ASSAY OF ALDOLASE: CPT

## 2019-09-05 PROCEDURE — 81003 URINALYSIS AUTO W/O SCOPE: CPT

## 2019-09-05 PROCEDURE — 3008F BODY MASS INDEX DOCD: CPT | Mod: CPTII,S$GLB,, | Performed by: INTERNAL MEDICINE

## 2019-09-05 PROCEDURE — 86160 COMPLEMENT ANTIGEN: CPT

## 2019-09-05 PROCEDURE — 82570 ASSAY OF URINE CREATININE: CPT

## 2019-09-05 RX ORDER — MYCOPHENOLATE MOFETIL 500 MG/1
500 TABLET ORAL 2 TIMES DAILY
Qty: 60 TABLET | Refills: 2 | Status: SHIPPED | OUTPATIENT
Start: 2019-09-05 | End: 2019-10-10

## 2019-09-05 RX ORDER — PREDNISONE 5 MG/1
TABLET ORAL
Qty: 90 TABLET | Refills: 0 | Status: SHIPPED | OUTPATIENT
Start: 2019-09-05 | End: 2019-10-10 | Stop reason: SDUPTHER

## 2019-09-05 NOTE — PROGRESS NOTES
RHEUMATOLOGY CLINIC FOLLOW UP VISIT  Chief complaints:-  To follow up for  lupus.    HPI:-  Daija Garcia a 39 y.o. pleasant female comes in for a follow up visit.  She reports worsening cough and shortness of breath after the bronchoscopy procedure which improved after taking Bumex 2 mg daily.  She has been breathing better in the last week.  The cough is getting better.  No fever or chills.  No joint pain today.  Mild aches and pain diffuse.  No muscle weakness.  No photosensitive malar rash.  No sicca syndrome.  No Raynaud's phenomenon.  No treatment resistant headaches.  No seizures.  No psychosis.    Review of Systems   Constitutional: Negative for chills and fever.   HENT: Negative for congestion and sore throat.    Eyes: Negative for blurred vision and redness.   Respiratory: Positive for cough, sputum production and shortness of breath.    Cardiovascular: Negative for chest pain and leg swelling.   Gastrointestinal: Negative for abdominal pain.   Genitourinary: Negative for dysuria.   Musculoskeletal: Positive for back pain, myalgias and neck pain. Negative for falls and joint pain.   Skin: Negative for rash.   Neurological: Negative for headaches.   Endo/Heme/Allergies: Does not bruise/bleed easily.   Psychiatric/Behavioral: Negative for memory loss. The patient does not have insomnia.        Past Medical History:   Diagnosis Date    ADHD (attention deficit hyperactivity disorder)     Asthma     Hypertension     Hypothyroidism     TIA (transient ischemic attack)        Past Surgical History:   Procedure Laterality Date    Bronchoscopy Bilateral 8/16/2019    Performed by Virgilio Weiss MD at [facility]        Social History     Tobacco Use    Smoking status: Never Smoker    Smokeless tobacco: Never Used   Substance Use Topics    Alcohol use: Not Currently     Comment: social    Drug use: No       Family History   Problem Relation Age  "of Onset    Cancer Maternal Aunt         Breast, Back, Lung Cancer    Cancer Father        Review of patient's allergies indicates:   Allergen Reactions    Ceftriaxone Swelling     Patient states that BP spike when taken rocephin.     Citrus and derivatives     Codeine Swelling       Vitals:    09/05/19 0823   BP: 123/76   Pulse: 99   Weight: 119.7 kg (263 lb 14.3 oz)   Height: 5' 5" (1.651 m)   PainSc:   7       Physical Exam   Constitutional: She is oriented to person, place, and time and well-developed, well-nourished, and in no distress. No distress.   HENT:   Head: Normocephalic.   Mouth/Throat: Oropharynx is clear and moist.   Eyes: Pupils are equal, round, and reactive to light. Conjunctivae and EOM are normal.   Neck: Normal range of motion. Neck supple.   Cardiovascular: Normal rate and intact distal pulses.   Pulmonary/Chest: Effort normal. No respiratory distress.   Abdominal: Soft. There is no tenderness.   Musculoskeletal:   No synovitis over small joints of hands or feet.  No effusion over large joints.   Neurological: She is alert and oriented to person, place, and time. No cranial nerve deficit.   Skin: Skin is warm. No rash noted. No erythema.   Psychiatric: Mood and affect normal.   Nursing note and vitals reviewed.      Medication List with Changes/Refills   New Medications    MYCOPHENOLATE (CELLCEPT) 500 MG TAB    Take 1 tablet (500 mg total) by mouth 2 (two) times daily.   Current Medications    ALBUTEROL 90 MCG/ACTUATION INHALER    Inhale 2 puffs into the lungs every 6 (six) hours as needed for Wheezing.    ATORVASTATIN (LIPITOR) 20 MG TABLET    Take 1 tablet (20 mg total) by mouth every evening.    DULAGLUTIDE (TRULICITY) 1.5 MG/0.5 ML PNIJ    Inject 1.5 mg into the skin every 7 days.    FLUOCINOLONE (DERMA-SMOOTHE) 0.01 % EXTERNAL OIL    Apply oil to scalp twice a day    FLUTICASONE (FLONASE) 50 MCG/ACTUATION NASAL SPRAY    2 sprays (100 mcg total) by Each Nare route once daily.    " KETOCONAZOLE (NIZORAL) 2 % SHAMPOO    Wash hair with medicated shampoo at least 2x/week - let sit on scalp at least 5 minutes prior to rinsing    LEVOTHYROXINE (SYNTHROID) 88 MCG TABLET    Take 1 tablet (88 mcg total) by mouth once daily.    QUETIAPINE (SEROQUEL) 50 MG TABLET    Take 1 tablet (50 mg total) by mouth nightly.    SULFAMETHOXAZOLE-TRIMETHOPRIM 800-160MG (BACTRIM DS) 800-160 MG TAB    Take 2 tablets by mouth every Mon, Wed, Fri.    TRIAMTERENE-HYDROCHLOROTHIAZIDE 37.5-25 MG (MAXZIDE-25) 37.5-25 MG PER TABLET    Take 1 tablet by mouth once daily.   Changed and/or Refilled Medications    Modified Medication Previous Medication    PREDNISONE (DELTASONE) 5 MG TABLET predniSONE (DELTASONE) 20 MG tablet       Take 15 mg once daily next 2 weeks then 10 mg once daily thereafter    Take 1 tablet (20 mg total) by mouth once daily.   Discontinued Medications    SULFAMETHOXAZOLE-TRIMETHOPRIM 800-160MG (BACTRIM DS) 800-160 MG TAB    Take 1 tablet by mouth 2 (two) times daily. for 14 days       Assessment/Plans:-  1. ILD (interstitial lung disease)    2. Other systemic lupus erythematosus with lung involvement    3. Organized pneumonia    4. ANDERSON positive    5. Anti-RNP antibodies present    6. Chronic cough      Problem List Items Addressed This Visit        Pulmonary    ILD (interstitial lung disease) - Primary    Overview     pulmonary referral for ?sarcoidosis, lupus like syndrome         Current Assessment & Plan     Connective tissue disease related interstitial lung disease with ground-glass opacities.  Bronchoscopy showed mild inflammation and super infection.  Completed Bactrim 14 day course and now on Bactrim prophylaxis for Pneumocystis infection.  Reduce prednisone to 15 mg for the next 2 weeks and 10 mg daily thereafter.  Start CellCept.  Discussed in detail about all adverse effects of the medication including life-threatening infections.  Voiced understanding.         Relevant Medications     mycophenolate (CELLCEPT) 500 mg Tab    predniSONE (DELTASONE) 5 MG tablet    Other Relevant Orders    C3 complement    C4 complement    Sedimentation rate    C-reactive protein    CBC auto differential    Comprehensive metabolic panel    Protein / creatinine ratio, urine    Urinalysis    CK    Aldolase    Chronic cough    Overview     pulmonary specialist  continue steroids for now  continue inhalers for now         Current Assessment & Plan     Multiple factors including interstitial lung disease, superinfection, congestion from the recent bronchoscopy procedure.  Taking Bumex 2 mg daily with improvement.         Relevant Medications    predniSONE (DELTASONE) 5 MG tablet       Immunology/Multi System    Systemic lupus erythematosus with lung involvement    Current Assessment & Plan     Systemic lupus erythematosus with anti Smith antibody positivity, U1 RNP antibody positivity, arthralgias, myalgias, positive ANDERSON, elevated inflammatory markers.  No synovitis on examination today.  On 20 mg prednisone daily.  Start CellCept as advised above.  Check lupus activity markers along with muscle enzymes due to RNP antibody positivity for possible underlying myositis.         Relevant Medications    mycophenolate (CELLCEPT) 500 mg Tab    predniSONE (DELTASONE) 5 MG tablet    Other Relevant Orders    C3 complement    C4 complement    Sedimentation rate    C-reactive protein    CBC auto differential    Comprehensive metabolic panel    Protein / creatinine ratio, urine    Urinalysis    CK    Aldolase      Other Visit Diagnoses     Organized pneumonia        Relevant Medications    predniSONE (DELTASONE) 5 MG tablet    ANDERSON positive        Anti-RNP antibodies present        Relevant Medications    predniSONE (DELTASONE) 5 MG tablet        # Follow up in about 5 weeks (around 10/10/2019).      Disclaimer: This note was prepared using voice recognition system and is likely to have sound alike errors and is not proof read.  Please  call me with any questions.

## 2019-09-05 NOTE — ASSESSMENT & PLAN NOTE
Systemic lupus erythematosus with anti Smith antibody positivity, U1 RNP antibody positivity, arthralgias, myalgias, positive ANDERSON, elevated inflammatory markers.  No synovitis on examination today.  On 20 mg prednisone daily.  Start CellCept as advised above.  Check lupus activity markers along with muscle enzymes due to RNP antibody positivity for possible underlying myositis.

## 2019-09-05 NOTE — ASSESSMENT & PLAN NOTE
Connective tissue disease related interstitial lung disease with ground-glass opacities.  Bronchoscopy showed mild inflammation and super infection.  Completed Bactrim 14 day course and now on Bactrim prophylaxis for Pneumocystis infection.  Reduce prednisone to 15 mg for the next 2 weeks and 10 mg daily thereafter.  Start CellCept.  Discussed in detail about all adverse effects of the medication including life-threatening infections.  Voiced understanding.

## 2019-09-05 NOTE — ASSESSMENT & PLAN NOTE
Multiple factors including interstitial lung disease, superinfection, congestion from the recent bronchoscopy procedure.  Taking Bumex 2 mg daily with improvement.

## 2019-09-06 LAB — ALDOLASE SERPL-CCNC: 5.3 U/L (ref 1.2–7.6)

## 2019-09-10 NOTE — TELEPHONE ENCOUNTER
DOCUMENTATION ONLY:  Prior authorization Mycophenolate not required.    Co-pay: $9.91    Patient Assistance is not required.

## 2019-09-11 ENCOUNTER — PATIENT MESSAGE (OUTPATIENT)
Dept: PHARMACY | Facility: CLINIC | Age: 39
End: 2019-09-11

## 2019-09-12 NOTE — TELEPHONE ENCOUNTER
[facility] Initial Patient Consult: Mycophenolate     Initial Mycophenolate 500mg consult completed on . Mycophenolate 500mg will be shipped on  for delivery on . $9.91 copay. Patient will start Mycophenolate on .  Confirmed 2 patient identifiers - name and . Therapy Appropriate.      Patient was counseled on the administration directions for Mycophenolate 500mg:  -Take 1 tablets (500mg) by mouth twice daily   -Should be taken on an empty stomach (1 hour before or 2 hours after meals) but can be taken with food when necessary to decrease gastrointestinal side effects    -Do not chew, crush, or break the tablets.  Due to teratogenic nature of the medication, all patients, especially those of childbearing age, may handle the medication with their hands only if they wear latex or nitrile glove and wash their hands before and after handling the tablets.      Patient was counseled on the following possible side effects, which include, but are not limited to:  Diarrhea, nausea/vomiting, neutropenia with increased risk of infection     DDIs:  Medication list reviewed and no significant drug interactions were found.     Patient confirmed understanding. Patient did not have additional questions.   Patient will start Mycophenolate on . Consultation included: indication; goals of treatment; administration; storage and handling; side effects; how to handle side effects; the importance of compliance; how to handle missed doses; the importance of laboratory monitoring; the importance of keeping all follow up appointments. Mycophenolate REMS Medication Guide reviewed. Patient understands to report any medication changes to [facility] and provider.  All questions answered and addressed to patients satisfaction. I will f/u with patient in 1 week from start, [facility] to contact patient in 3 weeks for refills.     Vero Muñoz, PharmD, [facility]  Clinical Pharmacist   [facility]  Phone: [phone]

## 2019-09-16 LAB
FUNGUS SPEC CULT: NORMAL

## 2019-10-09 ENCOUNTER — PATIENT MESSAGE (OUTPATIENT)
Dept: SLEEP MEDICINE | Facility: CLINIC | Age: 39
End: 2019-10-09

## 2019-10-09 DIAGNOSIS — A49.8 INFECTION DUE TO STENOTROPHOMONAS MALTOPHILIA: ICD-10-CM

## 2019-10-09 RX ORDER — SULFAMETHOXAZOLE AND TRIMETHOPRIM 800; 160 MG/1; MG/1
1 TABLET ORAL 2 TIMES DAILY
Qty: 28 TABLET | Refills: 0 | OUTPATIENT
Start: 2019-10-09 | End: 2019-10-23

## 2019-10-10 ENCOUNTER — OFFICE VISIT (OUTPATIENT)
Dept: RHEUMATOLOGY | Facility: CLINIC | Age: 39
End: 2019-10-10
Payer: COMMERCIAL

## 2019-10-10 ENCOUNTER — LAB VISIT (OUTPATIENT)
Dept: LAB | Facility: HOSPITAL | Age: 39
End: 2019-10-10
Attending: INTERNAL MEDICINE
Payer: COMMERCIAL

## 2019-10-10 ENCOUNTER — TELEPHONE (OUTPATIENT)
Dept: PHARMACY | Facility: CLINIC | Age: 39
End: 2019-10-10

## 2019-10-10 VITALS
DIASTOLIC BLOOD PRESSURE: 88 MMHG | SYSTOLIC BLOOD PRESSURE: 136 MMHG | HEIGHT: 65 IN | HEART RATE: 109 BPM | WEIGHT: 267.19 LBS | BODY MASS INDEX: 44.52 KG/M2

## 2019-10-10 DIAGNOSIS — R76.8 ANTI-RNP ANTIBODIES PRESENT: ICD-10-CM

## 2019-10-10 DIAGNOSIS — R06.09 DYSPNEA ON EXERTION: ICD-10-CM

## 2019-10-10 DIAGNOSIS — J84.9 ILD (INTERSTITIAL LUNG DISEASE): Primary | ICD-10-CM

## 2019-10-10 DIAGNOSIS — M32.13 OTHER SYSTEMIC LUPUS ERYTHEMATOSUS WITH LUNG INVOLVEMENT: ICD-10-CM

## 2019-10-10 DIAGNOSIS — J84.9 ILD (INTERSTITIAL LUNG DISEASE): ICD-10-CM

## 2019-10-10 DIAGNOSIS — R05.3 CHRONIC COUGH: ICD-10-CM

## 2019-10-10 DIAGNOSIS — Z79.899 LONG-TERM CURRENT USE OF HIGH RISK MEDICATION OTHER THAN ANTICOAGULANT: ICD-10-CM

## 2019-10-10 DIAGNOSIS — Z29.89 NEED FOR PNEUMOCYSTIS PROPHYLAXIS: ICD-10-CM

## 2019-10-10 DIAGNOSIS — D84.9 IMMUNOSUPPRESSED STATUS: ICD-10-CM

## 2019-10-10 LAB
ALBUMIN SERPL BCP-MCNC: 3.1 G/DL (ref 3.5–5.2)
ALP SERPL-CCNC: 69 U/L (ref 55–135)
ALT SERPL W/O P-5'-P-CCNC: 17 U/L (ref 10–44)
ANION GAP SERPL CALC-SCNC: 9 MMOL/L (ref 8–16)
AST SERPL-CCNC: 17 U/L (ref 10–40)
BASOPHILS # BLD AUTO: 0.05 K/UL (ref 0–0.2)
BASOPHILS NFR BLD: 0.3 % (ref 0–1.9)
BILIRUB SERPL-MCNC: 0.2 MG/DL (ref 0.1–1)
BUN SERPL-MCNC: 13 MG/DL (ref 6–20)
CALCIUM SERPL-MCNC: 9.4 MG/DL (ref 8.7–10.5)
CHLORIDE SERPL-SCNC: 101 MMOL/L (ref 95–110)
CO2 SERPL-SCNC: 28 MMOL/L (ref 23–29)
CREAT SERPL-MCNC: 0.8 MG/DL (ref 0.5–1.4)
CRP SERPL-MCNC: 21.5 MG/L (ref 0–8.2)
DIFFERENTIAL METHOD: ABNORMAL
EOSINOPHIL # BLD AUTO: 0.1 K/UL (ref 0–0.5)
EOSINOPHIL NFR BLD: 0.6 % (ref 0–8)
ERYTHROCYTE [DISTWIDTH] IN BLOOD BY AUTOMATED COUNT: 19.8 % (ref 11.5–14.5)
ERYTHROCYTE [SEDIMENTATION RATE] IN BLOOD BY WESTERGREN METHOD: 54 MM/HR (ref 0–36)
EST. GFR  (AFRICAN AMERICAN): >60 ML/MIN/1.73 M^2
EST. GFR  (NON AFRICAN AMERICAN): >60 ML/MIN/1.73 M^2
GLUCOSE SERPL-MCNC: 94 MG/DL (ref 70–110)
HCT VFR BLD AUTO: 37.6 % (ref 37–48.5)
HGB BLD-MCNC: 11.8 G/DL (ref 12–16)
IMM GRANULOCYTES # BLD AUTO: 0.11 K/UL (ref 0–0.04)
IMM GRANULOCYTES NFR BLD AUTO: 0.7 % (ref 0–0.5)
LYMPHOCYTES # BLD AUTO: 4.3 K/UL (ref 1–4.8)
LYMPHOCYTES NFR BLD: 27.7 % (ref 18–48)
MCH RBC QN AUTO: 25.8 PG (ref 27–31)
MCHC RBC AUTO-ENTMCNC: 31.4 G/DL (ref 32–36)
MCV RBC AUTO: 82 FL (ref 82–98)
MONOCYTES # BLD AUTO: 0.9 K/UL (ref 0.3–1)
MONOCYTES NFR BLD: 5.6 % (ref 4–15)
NEUTROPHILS # BLD AUTO: 10.3 K/UL (ref 1.8–7.7)
NEUTROPHILS NFR BLD: 65.8 % (ref 38–73)
NRBC BLD-RTO: 0 /100 WBC
PLATELET # BLD AUTO: 359 K/UL (ref 150–350)
PMV BLD AUTO: 10.2 FL (ref 9.2–12.9)
POTASSIUM SERPL-SCNC: 3.4 MMOL/L (ref 3.5–5.1)
PROT SERPL-MCNC: 7.5 G/DL (ref 6–8.4)
RBC # BLD AUTO: 4.57 M/UL (ref 4–5.4)
SODIUM SERPL-SCNC: 138 MMOL/L (ref 136–145)
WBC # BLD AUTO: 15.64 K/UL (ref 3.9–12.7)

## 2019-10-10 PROCEDURE — 85025 COMPLETE CBC W/AUTO DIFF WBC: CPT

## 2019-10-10 PROCEDURE — 80053 COMPREHEN METABOLIC PANEL: CPT

## 2019-10-10 PROCEDURE — 3008F PR BODY MASS INDEX (BMI) DOCUMENTED: ICD-10-PCS | Mod: CPTII,S$GLB,, | Performed by: INTERNAL MEDICINE

## 2019-10-10 PROCEDURE — 3079F PR MOST RECENT DIASTOLIC BLOOD PRESSURE 80-89 MM HG: ICD-10-PCS | Mod: CPTII,S$GLB,, | Performed by: INTERNAL MEDICINE

## 2019-10-10 PROCEDURE — 3008F BODY MASS INDEX DOCD: CPT | Mod: CPTII,S$GLB,, | Performed by: INTERNAL MEDICINE

## 2019-10-10 PROCEDURE — 85652 RBC SED RATE AUTOMATED: CPT

## 2019-10-10 PROCEDURE — 36415 COLL VENOUS BLD VENIPUNCTURE: CPT

## 2019-10-10 PROCEDURE — 99999 PR PBB SHADOW E&M-EST. PATIENT-LVL III: CPT | Mod: PBBFAC,,, | Performed by: INTERNAL MEDICINE

## 2019-10-10 PROCEDURE — 3079F DIAST BP 80-89 MM HG: CPT | Mod: CPTII,S$GLB,, | Performed by: INTERNAL MEDICINE

## 2019-10-10 PROCEDURE — 99215 PR OFFICE/OUTPT VISIT, EST, LEVL V, 40-54 MIN: ICD-10-PCS | Mod: S$GLB,,, | Performed by: INTERNAL MEDICINE

## 2019-10-10 PROCEDURE — 99215 OFFICE O/P EST HI 40 MIN: CPT | Mod: S$GLB,,, | Performed by: INTERNAL MEDICINE

## 2019-10-10 PROCEDURE — 3075F SYST BP GE 130 - 139MM HG: CPT | Mod: CPTII,S$GLB,, | Performed by: INTERNAL MEDICINE

## 2019-10-10 PROCEDURE — 99999 PR PBB SHADOW E&M-EST. PATIENT-LVL III: ICD-10-PCS | Mod: PBBFAC,,, | Performed by: INTERNAL MEDICINE

## 2019-10-10 PROCEDURE — 86140 C-REACTIVE PROTEIN: CPT

## 2019-10-10 PROCEDURE — 3075F PR MOST RECENT SYSTOLIC BLOOD PRESS GE 130-139MM HG: ICD-10-PCS | Mod: CPTII,S$GLB,, | Performed by: INTERNAL MEDICINE

## 2019-10-10 RX ORDER — MYCOPHENOLATE MOFETIL 500 MG/1
1500 TABLET ORAL 2 TIMES DAILY
Qty: 180 TABLET | Refills: 2 | Status: SHIPPED | OUTPATIENT
Start: 2019-10-10 | End: 2020-01-03

## 2019-10-10 RX ORDER — BUMETANIDE 1 MG/1
1 TABLET ORAL DAILY
Qty: 30 TABLET | Refills: 3 | Status: SHIPPED | OUTPATIENT
Start: 2019-10-10 | End: 2020-03-27 | Stop reason: SDUPTHER

## 2019-10-10 RX ORDER — SULFAMETHOXAZOLE AND TRIMETHOPRIM 800; 160 MG/1; MG/1
TABLET ORAL
Qty: 36 TABLET | Refills: 1 | Status: SHIPPED | OUTPATIENT
Start: 2019-10-10 | End: 2020-03-27

## 2019-10-10 RX ORDER — PREDNISONE 5 MG/1
TABLET ORAL
Qty: 90 TABLET | Refills: 0 | Status: SHIPPED | OUTPATIENT
Start: 2019-10-10 | End: 2019-10-24 | Stop reason: SDUPTHER

## 2019-10-10 NOTE — ASSESSMENT & PLAN NOTE
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  Tolerating CellCept well.  Increase dose of CellCept to 1500 mg twice daily.  Try to reduce prednisone to 15 mg daily for the next 2 weeks and then to 10 mg daily thereafter.  Continue Bactrim 3 times weekly for Pneumocystis pneumonia prophylaxis.  Advised all adverse effects.

## 2019-10-10 NOTE — ASSESSMENT & PLAN NOTE
Anti RNP antibody positivity with interstitial lung disease.  Check echo for pulmonary artery hypertension.  No myositis.  No dermatomyositis rash.  Advised photosensitivity.

## 2019-10-10 NOTE — PROGRESS NOTES
RHEUMATOLOGY CLINIC FOLLOW UP VISIT  Chief complaints:-  To follow up for lupus.    HPI:-  Daija Garcia a 39 y.o. pleasant female comes in for a follow up visit.  She still have not reduce the dose of prednisone from 20 mg since she was afraid of any flares.  She still has persistent shortness of breath and significantly reduced exercise tolerance which seldom improves with any measures except taking Bumex.  Intermittent cough still present.  Persistent fatigue.  No leg swelling.  No Raynaud's phenomenon.  No skin rash.  No photosensitivity.  No sicca syndrome.    Review of Systems   Constitutional: Positive for malaise/fatigue. Negative for chills and fever.   HENT: Negative for congestion and sore throat.    Eyes: Negative for blurred vision and redness.   Respiratory: Positive for cough and shortness of breath.    Cardiovascular: Negative for chest pain and leg swelling.   Gastrointestinal: Negative for abdominal pain.   Genitourinary: Negative for dysuria.   Musculoskeletal: Positive for joint pain. Negative for back pain, falls, myalgias and neck pain.   Skin: Negative for rash.   Neurological: Negative for headaches.   Endo/Heme/Allergies: Does not bruise/bleed easily.   Psychiatric/Behavioral: Negative for memory loss. The patient does not have insomnia.        Past Medical History:   Diagnosis Date    ADHD (attention deficit hyperactivity disorder)     Asthma     Hypertension     Hypothyroidism     TIA (transient ischemic attack)        Past Surgical History:   Procedure Laterality Date    BRONCHOSCOPY Bilateral 8/16/2019    Procedure: Bronchoscopy;  Surgeon: Virgilio Weiss MD;  Location: [facility];  Service: Endoscopy;  Laterality: Bilateral;        Social History     Tobacco Use    Smoking status: Never Smoker    Smokeless tobacco: Never Used   Substance Use Topics    Alcohol use: Not Currently     Comment: social    Drug use: No  "      Family History   Problem Relation Age of Onset    Cancer Maternal Aunt         Breast, Back, Lung Cancer    Cancer Father        Review of patient's allergies indicates:   Allergen Reactions    Ceftriaxone Swelling     Patient states that BP spike when taken rocephin.     Citrus and derivatives     Codeine Swelling       Vitals:    10/10/19 0859   BP: 136/88   Pulse: 109   Weight: 121.2 kg (267 lb 3.2 oz)   Height: 5' 5" (1.651 m)   PainSc:   8   PainLoc: Hand       Physical Exam   Constitutional: She is oriented to person, place, and time and well-developed, well-nourished, and in no distress. No distress.   HENT:   Head: Normocephalic.   Mouth/Throat: Oropharynx is clear and moist.   Eyes: Pupils are equal, round, and reactive to light. Conjunctivae and EOM are normal.   Neck: Normal range of motion. Neck supple.   Cardiovascular: Normal rate and intact distal pulses.   Pulmonary/Chest: Effort normal. No respiratory distress.   Abdominal: Soft. There is no tenderness.   Musculoskeletal:   No synovitis over small joints of hands or feet.  No effusion over large joints.   Neurological: She is alert and oriented to person, place, and time. No cranial nerve deficit.   Skin: Skin is warm. No rash noted. No erythema.   Psychiatric: Mood and affect normal.   Nursing note and vitals reviewed.      Medication List with Changes/Refills   New Medications    BUMETANIDE (BUMEX) 1 MG TABLET    Take 1 tablet (1 mg total) by mouth once daily.    SULFAMETHOXAZOLE-TRIMETHOPRIM 800-160MG (BACTRIM DS) 800-160 MG TAB    One DS tablet three times weekly   Current Medications    ALBUTEROL 90 MCG/ACTUATION INHALER    Inhale 2 puffs into the lungs every 6 (six) hours as needed for Wheezing.    ATORVASTATIN (LIPITOR) 20 MG TABLET    Take 1 tablet (20 mg total) by mouth every evening.    DULAGLUTIDE (TRULICITY) 1.5 MG/0.5 ML PNIJ    Inject 1.5 mg into the skin every 7 days.    FLUOCINOLONE (DERMA-SMOOTHE) 0.01 % EXTERNAL OIL    " Apply oil to scalp twice a day    FLUTICASONE (FLONASE) 50 MCG/ACTUATION NASAL SPRAY    2 sprays (100 mcg total) by Each Nare route once daily.    KETOCONAZOLE (NIZORAL) 2 % SHAMPOO    Wash hair with medicated shampoo at least 2x/week - let sit on scalp at least 5 minutes prior to rinsing    LEVOTHYROXINE (SYNTHROID) 88 MCG TABLET    Take 1 tablet (88 mcg total) by mouth once daily.    QUETIAPINE (SEROQUEL) 50 MG TABLET    Take 1 tablet (50 mg total) by mouth nightly.    TRIAMTERENE-HYDROCHLOROTHIAZIDE 37.5-25 MG (MAXZIDE-25) 37.5-25 MG PER TABLET    Take 1 tablet by mouth once daily.   Changed and/or Refilled Medications    Modified Medication Previous Medication    MYCOPHENOLATE (CELLCEPT) 500 MG TAB mycophenolate (CELLCEPT) 500 mg Tab       Take 3 tablets (1,500 mg total) by mouth 2 (two) times daily.    Take 1 tablet (500 mg total) by mouth 2 (two) times daily.    PREDNISONE (DELTASONE) 5 MG TABLET predniSONE (DELTASONE) 5 MG tablet       Take 15 mg once daily next 2 weeks then 10 mg once daily thereafter    Take 15 mg once daily next 2 weeks then 10 mg once daily thereafter       Assessment/Plans:-  1. ILD (interstitial lung disease)    2. Anti-RNP antibodies present    3. Other systemic lupus erythematosus with lung involvement    4. Need for pneumocystis prophylaxis    5. Chronic cough    6. Dyspnea on exertion    7. Long-term current use of high risk medication other than anticoagulant    8. 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  Tolerating CellCept well.  Increase dose of CellCept to 1500 mg twice daily.  Try to reduce prednisone to 15 mg daily for the next 2 weeks and then to 10 mg daily  thereafter.  Continue Bactrim 3 times weekly for Pneumocystis pneumonia prophylaxis.  Advised all adverse effects.         Relevant Medications    bumetanide (BUMEX) 1 MG tablet    mycophenolate (CELLCEPT) 500 mg Tab    predniSONE (DELTASONE) 5 MG tablet    Other Relevant Orders    Ambulatory Referral to Cardiology    Chronic cough    Overview     pulmonary specialist  continue steroids for now  continue inhalers for now         Current Assessment & Plan     Chronic cough secondary to interstitial lung disease.  Check echo to rule out pulmonary artery hypertension.  Continue follow-up with pulmonologist.         Relevant Medications    predniSONE (DELTASONE) 5 MG tablet       Immunology/Multi System    Systemic lupus erythematosus with lung involvement    Current Assessment & Plan     Positive ANDERSON, positive Smith antibody highly specific for systemic lupus.  Inflammatory arthritis without any associated myositis.  Prednisone and CellCept for inflammatory arthritis in association with interstitial lung disease.  Consider adding Benlysta if arthritis flares when prednisone tapered.         Relevant Medications    bumetanide (BUMEX) 1 MG tablet    mycophenolate (CELLCEPT) 500 mg Tab    predniSONE (DELTASONE) 5 MG tablet    Other Relevant Orders    2D echo with color flow doppler    Ambulatory Referral to Cardiology    Anti-RNP antibodies present    Current Assessment & Plan     Anti RNP antibody positivity with interstitial lung disease.  Check echo for pulmonary artery hypertension.  No myositis.  No dermatomyositis rash.  Advised photosensitivity.         Relevant Medications    bumetanide (BUMEX) 1 MG tablet    predniSONE (DELTASONE) 5 MG tablet    Other Relevant Orders    2D echo with color flow doppler    Ambulatory Referral to Cardiology    Immunosuppressed status    Current Assessment & Plan     Compromised immune system secondary to autoimmune disease and use of immunosuppressive drugs. Monitor carefully for  infections. Advised to get immediate medical care if any infection. Also advised strict adherence to age appropriate vaccinations and cancer screenings with PCP.             Other    Dyspnea on exertion    Current Assessment & Plan     Disproportionate dyspnea on exertion raises possibility of underlying pulmonary artery hypertension because of her RNP antibody positivity.  Check echo.  Referred to cardiology for right heart catheterization.  Continue Bumex 1 mg once daily for diuresis since that is only measure which helps with her dyspnea.         Need for pneumocystis prophylaxis    Current Assessment & Plan     Bactrim sent to pharmacy.  Advised all precautions.         Relevant Medications    sulfamethoxazole-trimethoprim 800-160mg (BACTRIM DS) 800-160 mg Tab    Long-term current use of high risk medication other than anticoagulant    Current Assessment & Plan     Hold CellCept if any infection.  Safety labs today.             # Follow up in about 4 weeks (around 11/7/2019).      Disclaimer: This note was prepared using voice recognition system and is likely to have sound alike errors and is not proof read.  Please call me with any questions.

## 2019-10-10 NOTE — TELEPHONE ENCOUNTER
Refill and dose change call for mycophenolate. Patient confirmed need of the refill. Will [employer] on 10/10 to arrive on 10/11 with patient consent. Copay $29.22 at 004 with auth to charge [facility] on agreed date (10/11). Address confirmed. Patient has 14 doses remaining (4+ day supply). Patient denies missed doses and no side effects.  No new allergies/medical conditions. Patient has started taking bumetanide and has restarted bactrim - no DDIs encountered - physician reviewed in clinic today. Relevant labs are stable as of 10/10/19 - CRP remains elevated at 21.5mg/L; other labs WNL. No ER/Urgent care visits in past month. Patient taking the medication as directed - will increase from 2ts po bid to 3ts po bid starting today. Patient denies any further questions. Confirmed 2 patient identifiers - Name and .

## 2019-10-10 NOTE — ASSESSMENT & PLAN NOTE
Positive ANDERSON, positive Buckley antibody highly specific for systemic lupus.  Inflammatory arthritis without any associated myositis.  Prednisone and CellCept for inflammatory arthritis in association with interstitial lung disease.  Consider adding Benlysta if arthritis flares when prednisone tapered.

## 2019-10-10 NOTE — ASSESSMENT & PLAN NOTE
Chronic cough secondary to interstitial lung disease.  Check echo to rule out pulmonary artery hypertension.  Continue follow-up with pulmonologist.

## 2019-10-10 NOTE — LETTER
October 10, 2019      [facility]  [address]  [place] [address]  Phone: [phone]  Fax: [phone]       Patient: Daija Luna   YOB: 1980  Date of Visit: 10/10/2019    To Whom It May Concern:    Meagan Luna  was at [facility] on 10/09/2019. She may return to work/school on 10/11/2019 . If you have any questions or concerns, or if I can be of further assistance, please do not hesitate to contact me.      Sincerely,        Quinton Blandon LPN

## 2019-10-10 NOTE — ASSESSMENT & PLAN NOTE
Disproportionate dyspnea on exertion raises possibility of underlying pulmonary artery hypertension because of her RNP antibody positivity.  Check echo.  Referred to cardiology for right heart catheterization.  Continue Bumex 1 mg once daily for diuresis since that is only measure which helps with her dyspnea.

## 2019-10-15 DIAGNOSIS — R05.3 CHRONIC COUGH: ICD-10-CM

## 2019-10-15 DIAGNOSIS — R76.8 ANTI-RNP ANTIBODIES PRESENT: ICD-10-CM

## 2019-10-15 DIAGNOSIS — R76.8 ANA POSITIVE: ICD-10-CM

## 2019-10-15 DIAGNOSIS — I10 HTN (HYPERTENSION): ICD-10-CM

## 2019-10-15 DIAGNOSIS — J84.89 ORGANIZED PNEUMONIA: ICD-10-CM

## 2019-10-15 DIAGNOSIS — I10 ESSENTIAL HYPERTENSION: Primary | ICD-10-CM

## 2019-10-15 RX ORDER — PREDNISONE 10 MG/1
TABLET ORAL
Qty: 60 TABLET | Refills: 1 | OUTPATIENT
Start: 2019-10-15

## 2019-10-18 LAB
ACID FAST MOD KINY STN SPEC: NORMAL
MYCOBACTERIUM SPEC QL CULT: NORMAL

## 2019-10-24 DIAGNOSIS — R76.8 ANTI-RNP ANTIBODIES PRESENT: ICD-10-CM

## 2019-10-24 DIAGNOSIS — J84.9 ILD (INTERSTITIAL LUNG DISEASE): ICD-10-CM

## 2019-10-24 DIAGNOSIS — R05.3 CHRONIC COUGH: ICD-10-CM

## 2019-10-24 DIAGNOSIS — J84.89 ORGANIZED PNEUMONIA: ICD-10-CM

## 2019-10-24 DIAGNOSIS — M32.13 OTHER SYSTEMIC LUPUS ERYTHEMATOSUS WITH LUNG INVOLVEMENT: ICD-10-CM

## 2019-10-24 RX ORDER — PREDNISONE 5 MG/1
TABLET ORAL
Qty: 90 TABLET | Refills: 0 | Status: SHIPPED | OUTPATIENT
Start: 2019-10-24 | End: 2019-12-09 | Stop reason: SDUPTHER

## 2019-10-28 PROBLEM — Z00.00 ANNUAL PHYSICAL EXAM: Status: RESOLVED | Noted: 2018-02-04 | Resolved: 2019-10-28

## 2019-11-03 ENCOUNTER — PATIENT MESSAGE (OUTPATIENT)
Dept: RHEUMATOLOGY | Facility: CLINIC | Age: 39
End: 2019-11-03

## 2019-11-04 ENCOUNTER — TELEPHONE (OUTPATIENT)
Dept: PHARMACY | Facility: CLINIC | Age: 39
End: 2019-11-04

## 2019-11-04 NOTE — TELEPHONE ENCOUNTER
Let me forward your message to  to see whether he would be able to provide help in terms of your cough.

## 2019-11-05 ENCOUNTER — PATIENT MESSAGE (OUTPATIENT)
Dept: RHEUMATOLOGY | Facility: CLINIC | Age: 39
End: 2019-11-05

## 2019-11-05 ENCOUNTER — CLINICAL SUPPORT (OUTPATIENT)
Dept: CARDIOLOGY | Facility: CLINIC | Age: 39
End: 2019-11-05
Attending: INTERNAL MEDICINE
Payer: COMMERCIAL

## 2019-11-05 ENCOUNTER — HOSPITAL ENCOUNTER (OUTPATIENT)
Dept: RADIOLOGY | Facility: HOSPITAL | Age: 39
Discharge: HOME | End: 2019-11-05
Attending: INTERNAL MEDICINE
Payer: COMMERCIAL

## 2019-11-05 ENCOUNTER — PATIENT MESSAGE (OUTPATIENT)
Dept: SLEEP MEDICINE | Facility: CLINIC | Age: 39
End: 2019-11-05

## 2019-11-05 DIAGNOSIS — R05.9 COUGH: Primary | ICD-10-CM

## 2019-11-05 DIAGNOSIS — J84.89 ORGANIZED PNEUMONIA: ICD-10-CM

## 2019-11-05 DIAGNOSIS — R05.3 CHRONIC COUGH: ICD-10-CM

## 2019-11-05 DIAGNOSIS — R76.8 ANTI-RNP ANTIBODIES PRESENT: ICD-10-CM

## 2019-11-05 DIAGNOSIS — M32.13 OTHER SYSTEMIC LUPUS ERYTHEMATOSUS WITH LUNG INVOLVEMENT: ICD-10-CM

## 2019-11-05 DIAGNOSIS — R76.8 ANA POSITIVE: ICD-10-CM

## 2019-11-05 LAB
DIASTOLIC DYSFUNCTION: NO
ESTIMATED PA SYSTOLIC PRESSURE: 32.59
RETIRED EF AND QEF - SEE NOTES: 60 (ref 55–65)

## 2019-11-05 PROCEDURE — 71046 XR CHEST PA AND LATERAL: ICD-10-PCS | Mod: 26,,, | Performed by: RADIOLOGY

## 2019-11-05 PROCEDURE — 93306 2D ECHO WITH COLOR FLOW DOPPLER: ICD-10-PCS | Mod: S$GLB,,, | Performed by: INTERNAL MEDICINE

## 2019-11-05 PROCEDURE — 93306 TTE W/DOPPLER COMPLETE: CPT | Mod: S$GLB,,, | Performed by: INTERNAL MEDICINE

## 2019-11-05 PROCEDURE — 71046 X-RAY EXAM CHEST 2 VIEWS: CPT | Mod: TC

## 2019-11-05 PROCEDURE — 71046 X-RAY EXAM CHEST 2 VIEWS: CPT | Mod: 26,,, | Performed by: RADIOLOGY

## 2019-11-06 ENCOUNTER — PATIENT MESSAGE (OUTPATIENT)
Dept: SLEEP MEDICINE | Facility: CLINIC | Age: 39
End: 2019-11-06

## 2019-11-06 ENCOUNTER — PATIENT MESSAGE (OUTPATIENT)
Dept: RHEUMATOLOGY | Facility: CLINIC | Age: 39
End: 2019-11-06

## 2019-11-12 ENCOUNTER — PATIENT MESSAGE (OUTPATIENT)
Dept: RHEUMATOLOGY | Facility: CLINIC | Age: 39
End: 2019-11-12

## 2019-11-14 ENCOUNTER — PATIENT MESSAGE (OUTPATIENT)
Dept: RHEUMATOLOGY | Facility: CLINIC | Age: 39
End: 2019-11-14

## 2019-12-09 DIAGNOSIS — J84.9 ILD (INTERSTITIAL LUNG DISEASE): ICD-10-CM

## 2019-12-09 DIAGNOSIS — R05.3 CHRONIC COUGH: ICD-10-CM

## 2019-12-09 DIAGNOSIS — J84.89 ORGANIZED PNEUMONIA: ICD-10-CM

## 2019-12-09 DIAGNOSIS — R76.8 ANTI-RNP ANTIBODIES PRESENT: ICD-10-CM

## 2019-12-09 DIAGNOSIS — M32.13 OTHER SYSTEMIC LUPUS ERYTHEMATOSUS WITH LUNG INVOLVEMENT: ICD-10-CM

## 2019-12-09 RX ORDER — PREDNISONE 5 MG/1
TABLET ORAL
Qty: 90 TABLET | Refills: 0 | Status: SHIPPED | OUTPATIENT
Start: 2019-12-09 | End: 2020-01-12 | Stop reason: SDUPTHER

## 2019-12-10 ENCOUNTER — TELEPHONE (OUTPATIENT)
Dept: PHARMACY | Facility: CLINIC | Age: 39
End: 2019-12-10

## 2019-12-10 NOTE — TELEPHONE ENCOUNTER
Azathioprine refill confirmed and reassessment complete. We will ship azathioprine refill on  via fedex to arrive on . $29.22 copay- 004. Confirmed 2 patient identifiers - name and . Therapy appropriate.     Patient reports she has a little over 7 days of medication on hand. Reviewed medication administration, storage, side effects, and precautions. Patient administers three 500 mg tablets (1500 mg) twice daily. Patient reports no side effects at this time. Patient reports no missed doses, no new allergies or health conditions reported at this time. Allergies reviewed and medication reconciliation complete (reviewed and documented in [facility] and [facility]).  Ms Luna's prednisione dose was reduced to 15 mg daily from 20 mg daily. Patient also was started on Bactrim for pneumocycstis prophylaxis with her continued steroid use with mycophenolate. 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  Patient reports she is going to make an appointment to follow-up with provider regarding her SOB. All questions answered and addressed to patient's satisfaction. Advised to call [facility] and provider if any issues arise.  Patient verbalized understanding.

## 2020-01-03 DIAGNOSIS — J84.9 ILD (INTERSTITIAL LUNG DISEASE): ICD-10-CM

## 2020-01-03 DIAGNOSIS — M32.13 OTHER SYSTEMIC LUPUS ERYTHEMATOSUS WITH LUNG INVOLVEMENT: ICD-10-CM

## 2020-01-06 RX ORDER — MYCOPHENOLATE MOFETIL 500 MG/1
1500 TABLET ORAL 2 TIMES DAILY
Qty: 180 TABLET | Refills: 2 | Status: SHIPPED | OUTPATIENT
Start: 2020-01-06 | End: 2020-03-29 | Stop reason: SDUPTHER

## 2020-01-08 ENCOUNTER — TELEPHONE (OUTPATIENT)
Dept: PHARMACY | Facility: CLINIC | Age: 40
End: 2020-01-08

## 2020-01-12 DIAGNOSIS — R76.8 ANTI-RNP ANTIBODIES PRESENT: ICD-10-CM

## 2020-01-12 DIAGNOSIS — M32.13 OTHER SYSTEMIC LUPUS ERYTHEMATOSUS WITH LUNG INVOLVEMENT: ICD-10-CM

## 2020-01-12 DIAGNOSIS — R05.3 CHRONIC COUGH: ICD-10-CM

## 2020-01-12 DIAGNOSIS — J84.9 ILD (INTERSTITIAL LUNG DISEASE): ICD-10-CM

## 2020-01-12 DIAGNOSIS — J84.89 ORGANIZED PNEUMONIA: ICD-10-CM

## 2020-01-12 RX ORDER — PREDNISONE 5 MG/1
TABLET ORAL
Qty: 90 TABLET | Refills: 0 | Status: SHIPPED | OUTPATIENT
Start: 2020-01-12 | End: 2020-03-27

## 2020-03-24 ENCOUNTER — TELEPHONE (OUTPATIENT)
Dept: RHEUMATOLOGY | Facility: CLINIC | Age: 40
End: 2020-03-24

## 2020-03-26 ENCOUNTER — OFFICE VISIT (OUTPATIENT)
Dept: RHEUMATOLOGY | Facility: CLINIC | Age: 40
End: 2020-03-26
Payer: COMMERCIAL

## 2020-03-26 ENCOUNTER — PATIENT MESSAGE (OUTPATIENT)
Dept: RHEUMATOLOGY | Facility: CLINIC | Age: 40
End: 2020-03-26

## 2020-03-26 ENCOUNTER — TELEPHONE (OUTPATIENT)
Dept: RHEUMATOLOGY | Facility: CLINIC | Age: 40
End: 2020-03-26

## 2020-03-26 DIAGNOSIS — Z79.899 LONG-TERM CURRENT USE OF HIGH RISK MEDICATION OTHER THAN ANTICOAGULANT: ICD-10-CM

## 2020-03-26 DIAGNOSIS — D84.9 IMMUNOSUPPRESSED STATUS: ICD-10-CM

## 2020-03-26 DIAGNOSIS — M32.13 OTHER SYSTEMIC LUPUS ERYTHEMATOSUS WITH LUNG INVOLVEMENT: Primary | ICD-10-CM

## 2020-03-26 DIAGNOSIS — J84.9 ILD (INTERSTITIAL LUNG DISEASE): Primary | ICD-10-CM

## 2020-03-26 DIAGNOSIS — M32.13 OTHER SYSTEMIC LUPUS ERYTHEMATOSUS WITH LUNG INVOLVEMENT: ICD-10-CM

## 2020-03-26 DIAGNOSIS — R76.8 ANTI-RNP ANTIBODIES PRESENT: ICD-10-CM

## 2020-03-26 PROCEDURE — 99215 PR OFFICE/OUTPT VISIT, EST, LEVL V, 40-54 MIN: ICD-10-PCS | Mod: 95,,, | Performed by: INTERNAL MEDICINE

## 2020-03-26 PROCEDURE — 99215 OFFICE O/P EST HI 40 MIN: CPT | Mod: 95,,, | Performed by: INTERNAL MEDICINE

## 2020-03-26 RX ORDER — HYDROXYCHLOROQUINE SULFATE 200 MG/1
200 TABLET, FILM COATED ORAL 2 TIMES DAILY
Qty: 60 TABLET | Refills: 6 | Status: SHIPPED | OUTPATIENT
Start: 2020-03-26 | End: 2020-06-15 | Stop reason: SDUPTHER

## 2020-03-26 NOTE — PROGRESS NOTES
The patient location is: Home  The chief complaint leading to consultation is: Lupus, ILD  Visit type: Virtual visit with synchronous audio and video  Total time spent with patient: 25 MINUTES  Each patient to whom he or she provides medical services by telemedicine is:  (1) informed of the relationship between the physician and patient and the respective role of any other health care provider with respect to management of the patient; and (2) notified that he or she may decline to receive medical services by telemedicine and may withdraw from such care at any time.    Notes:                                                       RHEUMATOLOGY CLINIC FOLLOW UP VISIT  Chief complaints:-  Lupus flare    HPI:-  Daija Garcia a 39 y.o. pleasant female comes in for a follow up visit. She stopped her medications since she got tired of so many medications.  Since then she has been having worsening shortness of breath, diffuse body pain in fatigue.  No skin rash.  She made the appointment today to discuss about restarting the medications.  She is determined to continue the medication distant since she has been progressively worsening.  No Raynaud's phenomenon.  No skin rash.  No photosensitivity.  No sicca syndrome.    Review of Systems   Constitutional: Positive for malaise/fatigue. Negative for chills and fever.   HENT: Negative for congestion and sore throat.    Eyes: Negative for blurred vision and redness.   Respiratory: Positive for cough and shortness of breath.    Cardiovascular: Negative for chest pain and leg swelling.   Gastrointestinal: Negative for abdominal pain.   Genitourinary: Negative for dysuria.   Musculoskeletal: Positive for joint pain. Negative for back pain, falls, myalgias and neck pain.   Skin: Negative for rash.   Neurological: Negative for headaches.   Endo/Heme/Allergies: Does not bruise/bleed easily.   Psychiatric/Behavioral: Negative for memory loss. The patient does not have insomnia.         Past Medical History:   Diagnosis Date    ADHD (attention deficit hyperactivity disorder)     Asthma     Hypertension     Hypothyroidism     TIA (transient ischemic attack)        Past Surgical History:   Procedure Laterality Date    BRONCHOSCOPY Bilateral 8/16/2019    Procedure: Bronchoscopy;  Surgeon: Virgilio Weiss MD;  Location: [facility];  Service: Endoscopy;  Laterality: Bilateral;        Social History     Tobacco Use    Smoking status: Never Smoker    Smokeless tobacco: Never Used   Substance Use Topics    Alcohol use: Not Currently     Comment: social    Drug use: No       Family History   Problem Relation Age of Onset    Cancer Maternal Aunt         Breast, Back, Lung Cancer    Cancer Father        Review of patient's allergies indicates:   Allergen Reactions    Ceftriaxone Swelling     Patient states that BP spike when taken rocephin.     Citrus and derivatives     Codeine Swelling               Medication List with Changes/Refills   Current Medications    ALBUTEROL 90 MCG/ACTUATION INHALER    Inhale 2 puffs into the lungs every 6 (six) hours as needed for Wheezing.    ATORVASTATIN (LIPITOR) 20 MG TABLET    Take 1 tablet (20 mg total) by mouth every evening.    BUMETANIDE (BUMEX) 1 MG TABLET    Take 1 tablet (1 mg total) by mouth once daily.    DULAGLUTIDE (TRULICITY) 1.5 MG/0.5 ML PNIJ    Inject 1.5 mg into the skin every 7 days.    FLUOCINOLONE (DERMA-SMOOTHE) 0.01 % EXTERNAL OIL    Apply oil to scalp twice a day    FLUTICASONE (FLONASE) 50 MCG/ACTUATION NASAL SPRAY    2 sprays (100 mcg total) by Each Nare route once daily.    KETOCONAZOLE (NIZORAL) 2 % SHAMPOO    Wash hair with medicated shampoo at least 2x/week - let sit on scalp at least 5 minutes prior to rinsing    LEVOTHYROXINE (SYNTHROID) 88 MCG TABLET    Take 1 tablet (88 mcg total) by mouth once daily.    MYCOPHENOLATE (CELLCEPT) 500 MG TAB    Take 3 tablets (1,500 mg total) by mouth 2 (two) times daily.    PREDNISONE  (DELTASONE) 5 MG TABLET    TAKE 3 TABLETS BY MOUTH EVERY DAY    QUETIAPINE (SEROQUEL) 50 MG TABLET    Take 1 tablet (50 mg total) by mouth nightly.    SULFAMETHOXAZOLE-TRIMETHOPRIM 800-160MG (BACTRIM DS) 800-160 MG TAB    One DS tablet three times weekly    TRIAMTERENE-HYDROCHLOROTHIAZIDE 37.5-25 MG (MAXZIDE-25) 37.5-25 MG PER TABLET    TAKE 1 TABLET BY MOUTH EVERY DAY       Assessment/Plans:-  1. ILD (interstitial lung disease)    2. Anti-RNP antibodies present    3. Other systemic lupus erythematosus with lung involvement    4. Long-term current use of high risk medication other than anticoagulant    5. Immunosuppressed status      Problem List Items Addressed This Visit        Pulmonary    ILD (interstitial lung disease) - Primary    Current Assessment & Plan     Worsening symptoms of interstitial lung disease since she been not on therapy.  Advised to restart CellCept after checking labs.  Start Plaquenil.  She might also have some overlap syndrome with scleroderma like lung disease-scleroderma related interstitial lung disease.  Check high-resolution CT scan when available.         Relevant Medications    hydroxychloroquine (PLAQUENIL) 200 mg tablet       Immunology/Multi System    Systemic lupus erythematosus with lung involvement    Current Assessment & Plan     Positive ANDERSON, positive anti Smith antibody, inflammatory arthritis without any associated myositis.  RNP antibody positivity suggest underlying overlap syndrome maybe with scleroderma like lung disease.  Restart CellCept.  Start Plaquenil.  Consider Ofev.         Relevant Medications    hydroxychloroquine (PLAQUENIL) 200 mg tablet    Anti-RNP antibodies present    Current Assessment & Plan     RNP antibody positivity with interstitial lung disease without muscle involvement.  No pulmonary artery hypertension.  No myositis.  No dermatomyositis rash.  Advised measures for photoprotection.  Check labs.         Immunosuppressed status    Current  Assessment & Plan     Compromised immune system secondary to autoimmune disease and use of immunosuppressive drugs. Monitor carefully for infections. Advised to get immediate medical care if any infection. Also advised strict adherence to age appropriate vaccinations and cancer screenings with PCP.  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             Other    Long-term current use of high risk medication other than anticoagulant    Current Assessment & Plan     Hold CellCept if any infection.  Safety labs before starting CellCept             # Follow up in about 2 months (around 5/26/2020).      Disclaimer: This note was prepared using voice recognition system and is likely to have sound alike errors and is not proof read.  Please call me with any questions.

## 2020-03-26 NOTE — ASSESSMENT & PLAN NOTE
Positive ANDERSON, positive anti Smith antibody, inflammatory arthritis without any associated myositis.  RNP antibody positivity suggest underlying overlap syndrome maybe with scleroderma like lung disease.  Restart CellCept.  Start Plaquenil.  Consider Ofev.

## 2020-03-26 NOTE — ASSESSMENT & PLAN NOTE
Compromised immune system secondary to autoimmune disease and use of immunosuppressive drugs. Monitor carefully for infections. Advised to get immediate medical care if any infection. Also advised strict adherence to age appropriate vaccinations and cancer screenings with PCP.  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

## 2020-03-26 NOTE — ASSESSMENT & PLAN NOTE
Worsening symptoms of interstitial lung disease since she been not on therapy.  Advised to restart CellCept after checking labs.  Start Plaquenil.  She might also have some overlap syndrome with scleroderma like lung disease-scleroderma related interstitial lung disease.  Check high-resolution CT scan when available.

## 2020-03-26 NOTE — TELEPHONE ENCOUNTER
----- Message from Bob Willis MD sent at 3/26/2020  3:03 PM CDT -----  Please schedule lupus labs except urine ASAP.

## 2020-03-26 NOTE — LETTER
March 26, 2020    Daija Luna  [address] [place] [address]             [facility]  [address] [facility]  [place] [address]  Phone: [phone]  Fax: [phone] To whomsoever it may concern.    Dear /,   Ms. Daija Luna is under my care in rheumatology clinic for her Interstitial lung disease caused by lupus. This makes her extremely high risk to have severe COVID-19 infection if she contracts it. In the wake of the recent pandemic , I would sincerely request you to provide opportunity for her to self quarantine herself at home at least for the next 4 weeks to prevent her from acquiring COVID-19.   Please feel free to call me with any questions or concerns.   Regards

## 2020-03-26 NOTE — ASSESSMENT & PLAN NOTE
RNP antibody positivity with interstitial lung disease without muscle involvement.  No pulmonary artery hypertension.  No myositis.  No dermatomyositis rash.  Advised measures for photoprotection.  Check labs.

## 2020-03-27 ENCOUNTER — LAB VISIT (OUTPATIENT)
Dept: LAB | Facility: HOSPITAL | Age: 40
End: 2020-03-27
Attending: INTERNAL MEDICINE

## 2020-03-27 ENCOUNTER — PATIENT MESSAGE (OUTPATIENT)
Dept: RHEUMATOLOGY | Facility: CLINIC | Age: 40
End: 2020-03-27

## 2020-03-27 DIAGNOSIS — J84.9 ILD (INTERSTITIAL LUNG DISEASE): ICD-10-CM

## 2020-03-27 DIAGNOSIS — M32.13 OTHER SYSTEMIC LUPUS ERYTHEMATOSUS WITH LUNG INVOLVEMENT: ICD-10-CM

## 2020-03-27 DIAGNOSIS — R76.8 ANTI-RNP ANTIBODIES PRESENT: ICD-10-CM

## 2020-03-27 LAB
ALBUMIN SERPL BCP-MCNC: 3.4 G/DL (ref 3.5–5.2)
ALP SERPL-CCNC: 93 U/L (ref 55–135)
ALT SERPL W/O P-5'-P-CCNC: 11 U/L (ref 10–44)
ANION GAP SERPL CALC-SCNC: 10 MMOL/L (ref 8–16)
AST SERPL-CCNC: 20 U/L (ref 10–40)
BASOPHILS # BLD AUTO: 0.07 K/UL (ref 0–0.2)
BASOPHILS NFR BLD: 0.6 % (ref 0–1.9)
BILIRUB SERPL-MCNC: 0.2 MG/DL (ref 0.1–1)
BUN SERPL-MCNC: 7 MG/DL (ref 6–20)
C3 SERPL-MCNC: 167 MG/DL (ref 50–180)
C4 SERPL-MCNC: 39 MG/DL (ref 11–44)
CALCIUM SERPL-MCNC: 9.1 MG/DL (ref 8.7–10.5)
CHLORIDE SERPL-SCNC: 105 MMOL/L (ref 95–110)
CO2 SERPL-SCNC: 26 MMOL/L (ref 23–29)
CREAT SERPL-MCNC: 0.8 MG/DL (ref 0.5–1.4)
CRP SERPL-MCNC: 23.8 MG/L (ref 0–8.2)
DIFFERENTIAL METHOD: ABNORMAL
EOSINOPHIL # BLD AUTO: 1.4 K/UL (ref 0–0.5)
EOSINOPHIL NFR BLD: 11.6 % (ref 0–8)
ERYTHROCYTE [DISTWIDTH] IN BLOOD BY AUTOMATED COUNT: 17.7 % (ref 11.5–14.5)
ERYTHROCYTE [SEDIMENTATION RATE] IN BLOOD BY WESTERGREN METHOD: 112 MM/HR (ref 0–36)
EST. GFR  (AFRICAN AMERICAN): >60 ML/MIN/1.73 M^2
EST. GFR  (NON AFRICAN AMERICAN): >60 ML/MIN/1.73 M^2
GLUCOSE SERPL-MCNC: 93 MG/DL (ref 70–110)
HCT VFR BLD AUTO: 39.3 % (ref 37–48.5)
HGB BLD-MCNC: 12.4 G/DL (ref 12–16)
IMM GRANULOCYTES # BLD AUTO: 0.04 K/UL (ref 0–0.04)
IMM GRANULOCYTES NFR BLD AUTO: 0.3 % (ref 0–0.5)
LYMPHOCYTES # BLD AUTO: 4 K/UL (ref 1–4.8)
LYMPHOCYTES NFR BLD: 34.4 % (ref 18–48)
MCH RBC QN AUTO: 27.2 PG (ref 27–31)
MCHC RBC AUTO-ENTMCNC: 31.6 G/DL (ref 32–36)
MCV RBC AUTO: 86 FL (ref 82–98)
MONOCYTES # BLD AUTO: 1 K/UL (ref 0.3–1)
MONOCYTES NFR BLD: 8.7 % (ref 4–15)
NEUTROPHILS # BLD AUTO: 5.2 K/UL (ref 1.8–7.7)
NEUTROPHILS NFR BLD: 44.7 % (ref 38–73)
NRBC BLD-RTO: 0 /100 WBC
PLATELET # BLD AUTO: 334 K/UL (ref 150–350)
PMV BLD AUTO: 10.4 FL (ref 9.2–12.9)
POTASSIUM SERPL-SCNC: 3.5 MMOL/L (ref 3.5–5.1)
PROT SERPL-MCNC: 7.8 G/DL (ref 6–8.4)
RBC # BLD AUTO: 4.56 M/UL (ref 4–5.4)
SODIUM SERPL-SCNC: 141 MMOL/L (ref 136–145)
WBC # BLD AUTO: 11.71 K/UL (ref 3.9–12.7)

## 2020-03-27 PROCEDURE — 86140 C-REACTIVE PROTEIN: CPT

## 2020-03-27 PROCEDURE — 85025 COMPLETE CBC W/AUTO DIFF WBC: CPT

## 2020-03-27 PROCEDURE — 80053 COMPREHEN METABOLIC PANEL: CPT

## 2020-03-27 PROCEDURE — 85652 RBC SED RATE AUTOMATED: CPT

## 2020-03-27 PROCEDURE — 86160 COMPLEMENT ANTIGEN: CPT

## 2020-03-27 PROCEDURE — 36415 COLL VENOUS BLD VENIPUNCTURE: CPT

## 2020-03-27 PROCEDURE — 86225 DNA ANTIBODY NATIVE: CPT

## 2020-03-27 PROCEDURE — 86160 COMPLEMENT ANTIGEN: CPT | Mod: 59

## 2020-03-27 RX ORDER — BUMETANIDE 1 MG/1
TABLET ORAL
Qty: 90 TABLET | Refills: 1 | Status: SHIPPED | OUTPATIENT
Start: 2020-03-27 | End: 2020-09-01

## 2020-03-28 ENCOUNTER — PATIENT MESSAGE (OUTPATIENT)
Dept: RHEUMATOLOGY | Facility: CLINIC | Age: 40
End: 2020-03-28

## 2020-03-28 DIAGNOSIS — J84.9 ILD (INTERSTITIAL LUNG DISEASE): ICD-10-CM

## 2020-03-28 DIAGNOSIS — M32.13 OTHER SYSTEMIC LUPUS ERYTHEMATOSUS WITH LUNG INVOLVEMENT: ICD-10-CM

## 2020-03-29 ENCOUNTER — PATIENT MESSAGE (OUTPATIENT)
Dept: RHEUMATOLOGY | Facility: CLINIC | Age: 40
End: 2020-03-29

## 2020-03-29 RX ORDER — MYCOPHENOLATE MOFETIL 500 MG/1
1500 TABLET ORAL 2 TIMES DAILY
Qty: 180 TABLET | Refills: 2 | Status: SHIPPED | OUTPATIENT
Start: 2020-03-29 | End: 2020-06-15 | Stop reason: SDUPTHER

## 2020-03-30 ENCOUNTER — TELEPHONE (OUTPATIENT)
Dept: PHARMACY | Facility: CLINIC | Age: 40
End: 2020-03-30

## 2020-03-30 LAB — DSDNA AB SER-ACNC: NORMAL [IU]/ML

## 2020-03-30 NOTE — TELEPHONE ENCOUNTER
LVM for callback to inform patient that [facility] received prescription for Cellcept and benefits investigation is required.  [facility] will be back in touch once insurance determination is received.

## 2020-03-31 NOTE — TELEPHONE ENCOUNTER
DOCUMENTATION ONLY:   Mycophenolate does not require prior authorization  Estimated Co-Pay: $29.22

## 2020-04-01 NOTE — TELEPHONE ENCOUNTER
Pt contacted [facility] on  regarding Cellcept therapy. Continuation of therapy- pt has ~5days on hand. Shipping  for patient to receive 4/3. Patient verified address. Copay is $29.22 in 004, [facility]. Confirmed med list, conditions, and allergies. Pt is existing [facility] patients and familiar with [facility] clinical services and refill process. No further questions or concerns. Verified patient name and  for HIPAA purposes.

## 2020-04-15 ENCOUNTER — PATIENT MESSAGE (OUTPATIENT)
Dept: RHEUMATOLOGY | Facility: CLINIC | Age: 40
End: 2020-04-15

## 2020-04-28 ENCOUNTER — TELEPHONE (OUTPATIENT)
Dept: PHARMACY | Facility: CLINIC | Age: 40
End: 2020-04-28

## 2020-04-28 ENCOUNTER — PATIENT MESSAGE (OUTPATIENT)
Dept: PHARMACY | Facility: CLINIC | Age: 40
End: 2020-04-28

## 2020-05-05 ENCOUNTER — TELEPHONE (OUTPATIENT)
Dept: PHARMACY | Facility: CLINIC | Age: 40
End: 2020-05-05

## 2020-05-05 ENCOUNTER — TELEPHONE (OUTPATIENT)
Dept: RHEUMATOLOGY | Facility: CLINIC | Age: 40
End: 2020-05-05

## 2020-05-05 NOTE — TELEPHONE ENCOUNTER
Called patient regarding appointment on 6.1.20 at 2.15 pm needing to be rescheduled due to Dr. Willis being out of clinic that day. No answer, left message for pt to call clinic back.

## 2020-05-07 ENCOUNTER — TELEPHONE (OUTPATIENT)
Dept: RHEUMATOLOGY | Facility: CLINIC | Age: 40
End: 2020-05-07

## 2020-05-11 ENCOUNTER — TELEPHONE (OUTPATIENT)
Dept: RHEUMATOLOGY | Facility: CLINIC | Age: 40
End: 2020-05-11

## 2020-05-11 NOTE — TELEPHONE ENCOUNTER
Called patient regarding appointment on 6.1.20 2.15 pm needing to be rescheduled due to Dr. Willis being out of clinic that day.  Left message for pt to call clinic back to reschedule.

## 2020-05-12 ENCOUNTER — TELEPHONE (OUTPATIENT)
Dept: RHEUMATOLOGY | Facility: CLINIC | Age: 40
End: 2020-05-12

## 2020-05-12 NOTE — TELEPHONE ENCOUNTER
Left message for pt to call clinic back regarding 6.1.20 appt needing to be rescheduled. Mychart message sent as well.

## 2020-05-14 ENCOUNTER — TELEPHONE (OUTPATIENT)
Dept: RHEUMATOLOGY | Facility: CLINIC | Age: 40
End: 2020-05-14

## 2020-05-14 NOTE — TELEPHONE ENCOUNTER
Called patient regarding appointment on 6.1.20 at 2.15 pm needing to be rescheduled due to Dr. Willis being out of clinic that day, no answer, left message for pt to call clinic back.

## 2020-05-21 ENCOUNTER — PATIENT MESSAGE (OUTPATIENT)
Dept: RHEUMATOLOGY | Facility: CLINIC | Age: 40
End: 2020-05-21

## 2020-05-27 ENCOUNTER — PATIENT MESSAGE (OUTPATIENT)
Dept: RHEUMATOLOGY | Facility: CLINIC | Age: 40
End: 2020-05-27

## 2020-05-27 DIAGNOSIS — M32.13 OTHER SYSTEMIC LUPUS ERYTHEMATOSUS WITH LUNG INVOLVEMENT: Primary | ICD-10-CM

## 2020-05-27 RX ORDER — PREDNISONE 10 MG/1
10 TABLET ORAL DAILY
Qty: 90 TABLET | Refills: 1 | Status: SHIPPED | OUTPATIENT
Start: 2020-05-27 | End: 2020-06-15 | Stop reason: SDUPTHER

## 2020-05-29 ENCOUNTER — TELEPHONE (OUTPATIENT)
Dept: PHARMACY | Facility: CLINIC | Age: 40
End: 2020-05-29

## 2020-06-12 ENCOUNTER — TELEPHONE (OUTPATIENT)
Dept: RHEUMATOLOGY | Facility: CLINIC | Age: 40
End: 2020-06-12

## 2020-06-12 NOTE — TELEPHONE ENCOUNTER
Spoke with pt and confirmed appointment with Dr. Willis for 6.15.20 at 8.45 am.       Changed to virtual visit.

## 2020-06-15 ENCOUNTER — TELEPHONE (OUTPATIENT)
Dept: RHEUMATOLOGY | Facility: CLINIC | Age: 40
End: 2020-06-15

## 2020-06-15 ENCOUNTER — OFFICE VISIT (OUTPATIENT)
Dept: RHEUMATOLOGY | Facility: CLINIC | Age: 40
End: 2020-06-15
Payer: COMMERCIAL

## 2020-06-15 ENCOUNTER — LAB VISIT (OUTPATIENT)
Dept: LAB | Facility: HOSPITAL | Age: 40
End: 2020-06-15
Payer: COMMERCIAL

## 2020-06-15 DIAGNOSIS — J84.9 ILD (INTERSTITIAL LUNG DISEASE): ICD-10-CM

## 2020-06-15 DIAGNOSIS — D84.9 IMMUNOSUPPRESSED STATUS: ICD-10-CM

## 2020-06-15 DIAGNOSIS — Z79.899 LONG-TERM CURRENT USE OF HIGH RISK MEDICATION OTHER THAN ANTICOAGULANT: ICD-10-CM

## 2020-06-15 DIAGNOSIS — M32.13 OTHER SYSTEMIC LUPUS ERYTHEMATOSUS WITH LUNG INVOLVEMENT: ICD-10-CM

## 2020-06-15 DIAGNOSIS — R76.8 ANTI-RNP ANTIBODIES PRESENT: ICD-10-CM

## 2020-06-15 DIAGNOSIS — M32.13 OTHER SYSTEMIC LUPUS ERYTHEMATOSUS WITH LUNG INVOLVEMENT: Primary | ICD-10-CM

## 2020-06-15 DIAGNOSIS — R05.3 CHRONIC COUGH: ICD-10-CM

## 2020-06-15 LAB
ALBUMIN SERPL BCP-MCNC: 3.3 G/DL (ref 3.5–5.2)
ALP SERPL-CCNC: 86 U/L (ref 55–135)
ALT SERPL W/O P-5'-P-CCNC: 12 U/L (ref 10–44)
ANION GAP SERPL CALC-SCNC: 12 MMOL/L (ref 8–16)
AST SERPL-CCNC: 12 U/L (ref 10–40)
BASOPHILS # BLD AUTO: 0.04 K/UL (ref 0–0.2)
BASOPHILS NFR BLD: 0.3 % (ref 0–1.9)
BILIRUB SERPL-MCNC: 0.2 MG/DL (ref 0.1–1)
BUN SERPL-MCNC: 11 MG/DL (ref 6–20)
CALCIUM SERPL-MCNC: 8.8 MG/DL (ref 8.7–10.5)
CHLORIDE SERPL-SCNC: 106 MMOL/L (ref 95–110)
CO2 SERPL-SCNC: 24 MMOL/L (ref 23–29)
CREAT SERPL-MCNC: 0.9 MG/DL (ref 0.5–1.4)
CRP SERPL-MCNC: 7.9 MG/L (ref 0–8.2)
DIFFERENTIAL METHOD: ABNORMAL
EOSINOPHIL # BLD AUTO: 0.2 K/UL (ref 0–0.5)
EOSINOPHIL NFR BLD: 1.4 % (ref 0–8)
ERYTHROCYTE [DISTWIDTH] IN BLOOD BY AUTOMATED COUNT: 16.3 % (ref 11.5–14.5)
ERYTHROCYTE [SEDIMENTATION RATE] IN BLOOD BY WESTERGREN METHOD: 50 MM/HR (ref 0–20)
EST. GFR  (AFRICAN AMERICAN): >60 ML/MIN/1.73 M^2
EST. GFR  (NON AFRICAN AMERICAN): >60 ML/MIN/1.73 M^2
GLUCOSE SERPL-MCNC: 85 MG/DL (ref 70–110)
HCT VFR BLD AUTO: 38.8 % (ref 37–48.5)
HGB BLD-MCNC: 11.7 G/DL (ref 12–16)
IMM GRANULOCYTES # BLD AUTO: 0.05 K/UL (ref 0–0.04)
IMM GRANULOCYTES NFR BLD AUTO: 0.4 % (ref 0–0.5)
LYMPHOCYTES # BLD AUTO: 4.7 K/UL (ref 1–4.8)
LYMPHOCYTES NFR BLD: 38 % (ref 18–48)
MCH RBC QN AUTO: 26.1 PG (ref 27–31)
MCHC RBC AUTO-ENTMCNC: 30.2 G/DL (ref 32–36)
MCV RBC AUTO: 86 FL (ref 82–98)
MONOCYTES # BLD AUTO: 0.7 K/UL (ref 0.3–1)
MONOCYTES NFR BLD: 6 % (ref 4–15)
NEUTROPHILS # BLD AUTO: 6.6 K/UL (ref 1.8–7.7)
NEUTROPHILS NFR BLD: 53.9 % (ref 38–73)
NRBC BLD-RTO: 0 /100 WBC
PLATELET # BLD AUTO: 385 K/UL (ref 150–350)
PMV BLD AUTO: 10.7 FL (ref 9.2–12.9)
POTASSIUM SERPL-SCNC: 3.5 MMOL/L (ref 3.5–5.1)
PROT SERPL-MCNC: 7.4 G/DL (ref 6–8.4)
RBC # BLD AUTO: 4.49 M/UL (ref 4–5.4)
SODIUM SERPL-SCNC: 142 MMOL/L (ref 136–145)
WBC # BLD AUTO: 12.33 K/UL (ref 3.9–12.7)

## 2020-06-15 PROCEDURE — 36415 COLL VENOUS BLD VENIPUNCTURE: CPT | Mod: PO

## 2020-06-15 PROCEDURE — 80053 COMPREHEN METABOLIC PANEL: CPT

## 2020-06-15 PROCEDURE — 85025 COMPLETE CBC W/AUTO DIFF WBC: CPT

## 2020-06-15 PROCEDURE — 99215 OFFICE O/P EST HI 40 MIN: CPT | Mod: 95,,, | Performed by: INTERNAL MEDICINE

## 2020-06-15 PROCEDURE — 86140 C-REACTIVE PROTEIN: CPT

## 2020-06-15 PROCEDURE — 99215 PR OFFICE/OUTPT VISIT, EST, LEVL V, 40-54 MIN: ICD-10-PCS | Mod: 95,,, | Performed by: INTERNAL MEDICINE

## 2020-06-15 PROCEDURE — 85651 RBC SED RATE NONAUTOMATED: CPT

## 2020-06-15 RX ORDER — HYDROXYCHLOROQUINE SULFATE 200 MG/1
200 TABLET, FILM COATED ORAL 2 TIMES DAILY
Qty: 60 TABLET | Refills: 6 | Status: SHIPPED | OUTPATIENT
Start: 2020-06-15 | End: 2020-11-16 | Stop reason: SDUPTHER

## 2020-06-15 RX ORDER — MYCOPHENOLATE MOFETIL 500 MG/1
1500 TABLET ORAL 2 TIMES DAILY
Qty: 180 TABLET | Refills: 2 | Status: SHIPPED | OUTPATIENT
Start: 2020-06-15 | End: 2020-09-29 | Stop reason: SDUPTHER

## 2020-06-15 RX ORDER — PREDNISONE 10 MG/1
10 TABLET ORAL DAILY
Qty: 90 TABLET | Refills: 1 | Status: SHIPPED | OUTPATIENT
Start: 2020-06-15 | End: 2020-08-19 | Stop reason: SDUPTHER

## 2020-06-15 NOTE — ASSESSMENT & PLAN NOTE
Compromised immune system secondary to autoimmune disease and use of immunosuppressive drugs. Monitor carefully for infections. Advised to get immediate medical care if any infection. Also advised strict adherence to age appropriate vaccinations and cancer screenings with PCP. ]

## 2020-06-15 NOTE — TELEPHONE ENCOUNTER
Spoke with pt and scheduled labs in [place] for today, 6.15.20. scheduled chest CT for 6.17.20 at 3.10 pm at the [place]. Advised patient we would request the Echo from Dr. Paez as well. Pt verbalized understanding

## 2020-06-15 NOTE — ASSESSMENT & PLAN NOTE
Positive ANDERSON, positive anti Smith antibody, inflammatory arthritis without any associated myositis.  RNP antibody positivity suggest underlying overlap syndrome maybe with scleroderma like lung disease. Continue cellcept, prednisone and Plaquenil.  Consider Ofev.   no

## 2020-06-15 NOTE — TELEPHONE ENCOUNTER
----- Message from Bob Willis MD sent at 6/15/2020  9:05 AM CDT -----  Please schedule CBC, CMP, ESR, CRP at nearest [facility] facility this week. CT chest asap. To get recent ECHO report from [place] cardiology office. Dr. Bird Paez.

## 2020-06-15 NOTE — PROGRESS NOTES
RHEUMATOLOGY FOLLOW UP - TELE VISIT     The patient location is: [place]  The chief complaint leading to consultation is: Lupus, cough, shortness of breath.  Visit type: Virtual visit with synchronous audio and video  Total time spent with patient: 20 minutes  Each patient to whom he or she provides medical services by telemedicine is:  (1) informed of the relationship between the physician and patient and the respective role of any other health care provider with respect to management of the patient; and (2) notified that he or she may decline to receive medical services by telemedicine and may withdraw from such care at any time.    HPI:-  Daija Garcia a 40 y.o. pleasant female seen today through My chart video visit for follow up.  She denies any change in her shortness of breath or cough.  No joint pain.  No swelling.  She has pitting pedal edema usually at the end of the day.  She recently saw her cardiologist at [facility].  Dr. Paez.  She underwent stress test and could not complete because of shortness of breath.  EKG showed some PVCs and she was started on metoprolol.  As per the patient the cardiologist recommended that the imaging of the heart looks okay but he is not sure about the blood pressure in the lungs.  He does not see a real indication to do cardiac catheterization at this time.  She denies any photosensitive rash.  No muscle pain or weakness.  No adverse effects from CellCept or Plaquenil.  On prednisone 10 mg daily.    Review of Systems   Constitutional: Positive for malaise/fatigue. Negative for chills and fever.   HENT: Negative for congestion and sore throat.    Eyes: Negative for blurred vision and redness.   Respiratory: Positive for cough and shortness of breath.    Cardiovascular: Positive for leg swelling. Negative for chest pain.   Gastrointestinal: Negative for abdominal pain.   Genitourinary: Negative for dysuria.   Musculoskeletal: Negative for back pain, falls,  joint pain, myalgias and neck pain.   Skin: Negative for rash.   Neurological: Negative for headaches.   Endo/Heme/Allergies: Does not bruise/bleed easily.   Psychiatric/Behavioral: Negative for memory loss. The patient does not have insomnia.        Past Medical History:   Diagnosis Date    ADHD (attention deficit hyperactivity disorder)     Asthma     Hypertension     Hypothyroidism     TIA (transient ischemic attack)        Past Surgical History:   Procedure Laterality Date    BRONCHOSCOPY Bilateral 8/16/2019    Procedure: Bronchoscopy;  Surgeon: Virgilio Weiss MD;  Location: [facility];  Service: Endoscopy;  Laterality: Bilateral;        Social History     Tobacco Use    Smoking status: Never Smoker    Smokeless tobacco: Never Used   Substance Use Topics    Alcohol use: Not Currently     Comment: social    Drug use: No       Family History   Problem Relation Age of Onset    Cancer Maternal Aunt         Breast, Back, Lung Cancer    Cancer Father        Review of patient's allergies indicates:   Allergen Reactions    Ceftriaxone Swelling     Patient states that BP spike when taken rocephin.     Citrus and derivatives     Codeine Swelling       Physical exam:-    GEN: awake, alert, non-diaphoretic, no psychomotor agitation, no acute distress    HEENT :Head: atraumatic, normocephalic, no rashes noted, no lesions noted;    Eyes: NO redness, discharge, swelling, or lesions    Nose: NO redness, swelling, discharge, deformity, or impetigo/crusting    Skin: no lesions, wounds, erythema, or cyanosis noted on face or hands    Cardiopulmonary: no increased respiratory effort, speaking in clear sentences    MSK: normal ROM in the neck, Upper extremities, Lower extremities  Good ROM of hands, fist formation 100% and , no obvious synovitis    Good ambulation in front of the camera    Neuro: cranial nerves grossly normal, speech normal rate and rhythm, orientation arrived to appointment on time with no  prompting, moved both upper extremities equally    Pysch:  appearance, behavior, and attitude- well groomed, pleasant, cooperative    Medication List with Changes/Refills   Current Medications    ALBUTEROL 90 MCG/ACTUATION INHALER    Inhale 2 puffs into the lungs every 6 (six) hours as needed for Wheezing.    ALBUTEROL-IPRATROPIUM (DUO-NEB) 2.5 MG-0.5 MG/3 ML NEBULIZER SOLUTION    TAKE 3 MLS BY NEBULIZATION EVERY 6 (SIX) HOURS AS NEEDED FOR WHEEZING. RESCUE    ALPRAZOLAM (XANAX) 0.25 MG TABLET    TAKE 1 TABLET (0.25 MG TOTAL) BY MOUTH 2 (TWO) TIMES DAILY AS NEEDED FOR ANXIETY.    ATORVASTATIN (LIPITOR) 20 MG TABLET    Take 1 tablet (20 mg total) by mouth every evening.    BUMETANIDE (BUMEX) 1 MG TABLET    TAKE 1 TABLET BY MOUTH EVERY DAY    DULAGLUTIDE (TRULICITY) 1.5 MG/0.5 ML PNIJ    Inject 1.5 mg into the skin every 7 days.    FLUOCINOLONE (DERMA-SMOOTHE) 0.01 % EXTERNAL OIL    Apply oil to scalp twice a day    FLUTICASONE (FLONASE) 50 MCG/ACTUATION NASAL SPRAY    2 sprays (100 mcg total) by Each Nare route once daily.    LEVOTHYROXINE (SYNTHROID) 88 MCG TABLET    Take 1 tablet (88 mcg total) by mouth once daily.    QUETIAPINE (SEROQUEL) 50 MG TABLET    Take 1 tablet (50 mg total) by mouth nightly.   Changed and/or Refilled Medications    Modified Medication Previous Medication    HYDROXYCHLOROQUINE (PLAQUENIL) 200 MG TABLET hydroxychloroquine (PLAQUENIL) 200 mg tablet       Take 1 tablet (200 mg total) by mouth 2 (two) times daily.    Take 1 tablet (200 mg total) by mouth 2 (two) times daily.    MYCOPHENOLATE (CELLCEPT) 500 MG TAB mycophenolate (CELLCEPT) 500 mg Tab       Take 3 tablets (1,500 mg total) by mouth 2 (two) times daily.    Take 3 tablets (1,500 mg total) by mouth 2 (two) times daily.    PREDNISONE (DELTASONE) 10 MG TABLET predniSONE (DELTASONE) 10 MG tablet       Take 1 tablet (10 mg total) by mouth once daily.    Take 1 tablet (10 mg total) by mouth once daily.       Assessment/Plans:-  1. Other  systemic lupus erythematosus with lung involvement    2. ILD (interstitial lung disease)    3. Anti-RNP antibodies present    4. Long-term current use of high risk medication other than anticoagulant    5. Immunosuppressed status    6. Chronic cough      Problem List Items Addressed This Visit        Pulmonary    ILD (interstitial lung disease)    Current Assessment & Plan     On cellcept, prednisone and plaquenil. Persistent cough and SOB. Repeat CT chest . If no worsening in CT , possible that the symptoms are due to PAH.          Relevant Medications    mycophenolate (CELLCEPT) 500 mg Tab    hydroxychloroquine (PLAQUENIL) 200 mg tablet    Other Relevant Orders    CT Chest Without Contrast    Chronic cough    Overview     pulmonary specialist  continue steroids for now  continue inhalers for now         Current Assessment & Plan     Check ECHO for PAH,. Reach out to cardiologist for Right heart cath if CT chest fails to show any worsening of ILD.             Immunology/Multi System    Systemic lupus erythematosus with lung involvement - Primary    Current Assessment & Plan     Positive ANDERSON, positive anti Smith antibody, inflammatory arthritis without any associated myositis.  RNP antibody positivity suggest underlying overlap syndrome maybe with scleroderma like lung disease. Continue cellcept, prednisone and Plaquenil.  Consider Ofev.         Relevant Medications    mycophenolate (CELLCEPT) 500 mg Tab    predniSONE (DELTASONE) 10 MG tablet    hydroxychloroquine (PLAQUENIL) 200 mg tablet    Other Relevant Orders    CT Chest Without Contrast    Anti-RNP antibodies present    Current Assessment & Plan     RNP antibody positivity with interstitial lung disease without muscle involvement.  No pulmonary artery hypertension.  No myositis.  No dermatomyositis rash.  Advised measures for photoprotection.  Check labs.         Relevant Orders    CT Chest Without Contrast    Immunosuppressed status    Current Assessment &  Plan     Compromised immune system secondary to autoimmune disease and use of immunosuppressive drugs. Monitor carefully for infections. Advised to get immediate medical care if any infection. Also advised strict adherence to age appropriate vaccinations and cancer screenings with PCP. ]              Other    Long-term current use of high risk medication other than anticoagulant    Current Assessment & Plan     Hold cellcept if any infection. Plaquenil eye exam every 6 months.                Follow up in about 3 months (around 9/15/2020).    Disclaimer: This note was prepared using voice recognition system and is likely to have sound alike errors and is not proof read.  Please call me with any questions.

## 2020-06-15 NOTE — Clinical Note
Please schedule CBC, CMP, ESR, CRP at nearest [facility] facility this week. CT chest asap. To get recent ECHO report from [place] [facility] office. Dr. Bird Paez.

## 2020-06-15 NOTE — ASSESSMENT & PLAN NOTE
Check ECHO for PAH,. Reach out to cardiologist for Right heart cath if CT chest fails to show any worsening of ILD.

## 2020-06-15 NOTE — ASSESSMENT & PLAN NOTE
On cellcept, prednisone and plaquenil. Persistent cough and SOB. Repeat CT chest . If no worsening in CT , possible that the symptoms are due to PAH.

## 2020-06-17 ENCOUNTER — HOSPITAL ENCOUNTER (OUTPATIENT)
Dept: RADIOLOGY | Facility: HOSPITAL | Age: 40
Discharge: HOME | End: 2020-06-17
Attending: INTERNAL MEDICINE
Payer: COMMERCIAL

## 2020-06-17 DIAGNOSIS — M32.13 OTHER SYSTEMIC LUPUS ERYTHEMATOSUS WITH LUNG INVOLVEMENT: ICD-10-CM

## 2020-06-17 DIAGNOSIS — R76.8 ANTI-RNP ANTIBODIES PRESENT: ICD-10-CM

## 2020-06-17 DIAGNOSIS — J84.9 ILD (INTERSTITIAL LUNG DISEASE): ICD-10-CM

## 2020-06-17 PROCEDURE — 71250 CT THORAX DX C-: CPT | Mod: TC

## 2020-06-17 PROCEDURE — 71250 CT CHEST WITHOUT CONTRAST: ICD-10-PCS | Mod: 26,,, | Performed by: RADIOLOGY

## 2020-06-17 PROCEDURE — 71250 CT THORAX DX C-: CPT | Mod: 26,,, | Performed by: RADIOLOGY

## 2020-06-20 ENCOUNTER — PATIENT MESSAGE (OUTPATIENT)
Dept: RHEUMATOLOGY | Facility: CLINIC | Age: 40
End: 2020-06-20

## 2020-06-22 RX ORDER — SULFAMETHOXAZOLE AND TRIMETHOPRIM 800; 160 MG/1; MG/1
TABLET ORAL
Qty: 36 TABLET | Refills: 1 | Status: SHIPPED | OUTPATIENT
Start: 2020-06-22 | End: 2020-12-23

## 2020-06-26 ENCOUNTER — TELEPHONE (OUTPATIENT)
Dept: PHARMACY | Facility: CLINIC | Age: 40
End: 2020-06-26

## 2020-06-30 NOTE — TELEPHONE ENCOUNTER
LVM with patient on 6/30 for Cellcept refill and follow-up. $28.57 copay (004). Sent [employer] message.

## 2020-07-02 NOTE — TELEPHONE ENCOUNTER
Pt confirmed shipping of Cellcept on  to arrive on . Address and  verified. $28.57 copay in 004, [facility]. Pt reported ~ 5 days on hand. Pt reported no missed doses. Pt did not start any new medications. Pt had no further questions or concerns.

## 2020-07-31 ENCOUNTER — PATIENT MESSAGE (OUTPATIENT)
Dept: RHEUMATOLOGY | Facility: CLINIC | Age: 40
End: 2020-07-31

## 2020-08-19 DIAGNOSIS — M32.13 OTHER SYSTEMIC LUPUS ERYTHEMATOSUS WITH LUNG INVOLVEMENT: ICD-10-CM

## 2020-08-19 RX ORDER — PREDNISONE 10 MG/1
10 TABLET ORAL DAILY
Qty: 90 TABLET | Refills: 1 | Status: SHIPPED | OUTPATIENT
Start: 2020-08-19 | End: 2020-11-16

## 2020-08-27 ENCOUNTER — TELEPHONE (OUTPATIENT)
Dept: PHARMACY | Facility: CLINIC | Age: 40
End: 2020-08-27

## 2020-09-26 ENCOUNTER — TELEPHONE (OUTPATIENT)
Dept: PHARMACY | Facility: CLINIC | Age: 40
End: 2020-09-26

## 2020-09-29 DIAGNOSIS — M32.13 OTHER SYSTEMIC LUPUS ERYTHEMATOSUS WITH LUNG INVOLVEMENT: ICD-10-CM

## 2020-09-29 DIAGNOSIS — J84.9 ILD (INTERSTITIAL LUNG DISEASE): ICD-10-CM

## 2020-09-29 RX ORDER — MYCOPHENOLATE MOFETIL 500 MG/1
1500 TABLET ORAL 2 TIMES DAILY
Qty: 180 TABLET | Refills: 0 | Status: SHIPPED | OUTPATIENT
Start: 2020-09-29 | End: 2020-10-23 | Stop reason: SDUPTHER

## 2020-10-05 ENCOUNTER — TELEPHONE (OUTPATIENT)
Dept: RHEUMATOLOGY | Facility: CLINIC | Age: 40
End: 2020-10-05

## 2020-10-05 NOTE — TELEPHONE ENCOUNTER
Left message to confirm apt for 10.6.2020 at 2.45 pm with Dr. Willis at the [facility] clinic.

## 2020-10-23 ENCOUNTER — SPECIALTY PHARMACY (OUTPATIENT)
Dept: PHARMACY | Facility: CLINIC | Age: 40
End: 2020-10-23

## 2020-10-23 DIAGNOSIS — J84.9 ILD (INTERSTITIAL LUNG DISEASE): ICD-10-CM

## 2020-10-23 DIAGNOSIS — M32.13 OTHER SYSTEMIC LUPUS ERYTHEMATOSUS WITH LUNG INVOLVEMENT: ICD-10-CM

## 2020-10-23 RX ORDER — MYCOPHENOLATE MOFETIL 500 MG/1
1500 TABLET ORAL 2 TIMES DAILY
Qty: 180 TABLET | Refills: 0 | Status: SHIPPED | OUTPATIENT
Start: 2020-10-23 | End: 2020-11-16 | Stop reason: SDUPTHER

## 2020-11-02 NOTE — TELEPHONE ENCOUNTER
Specialty Pharmacy - Refill Coordination    Specialty Medication Orders Linked to Encounter      Most Recent Value   Medication #1  mycophenolate (CELLCEPT) 500 mg Tab (Order#[id number], Rx#)          Refill Questions - Documented Responses      Most Recent Value   Relationship to patient of person spoken to?  Self   HIPAA/medical authority confirmed?  Yes   Any changes in contact preferences or allowed representatives?  No   Has the patient had any insurance changes?  No   Has the patient had any changes to specialty medication, dose, or instructions?  No   Has the patient started taking any new medications, herbals, or supplements?  (!) Yes [Xanax, Verapamil]   Has the patient been diagnosed with any new medical conditions?  No   Does the patient have any new allergies to medications or foods?  No   Does the patient have any concerns about side effects?  No   How many doses did the patient miss in the past 4 weeks or since the last fill?  0   How many doses does the patient have on hand?  12   How many days does the patient report on hand quantity will last?  2   Does the number of doses/days supply remaining match pharmacy expected amounts?  Yes   How will the patient receive the medication?  Mail   When does the patient need to receive the medication?  11/04/20   Shipping Address  Home   Expected Copay ($)  28.57   Payment Method  CC on file   Days supply of Refill  30   Would patient like to speak to a pharmacist?  No   Do you want to trigger an intervention?  No   Do you want to trigger an additional referral task?  No   Refill activity completed?  Yes   Refill activity plan  Refill scheduled   Shipment/Pickup Date:  11/02/20          Current Outpatient Medications   Medication Sig    albuterol 90 mcg/actuation inhaler Inhale 2 puffs into the lungs every 6 (six) hours as needed for Wheezing.    albuterol-ipratropium (DUO-NEB) 2.5 mg-0.5 mg/3 mL nebulizer solution TAKE 3 MLS BY NEBULIZATION EVERY 6 (SIX) HOURS  AS NEEDED FOR WHEEZING. RESCUE    ALPRAZolam (XANAX) 0.25 MG tablet TAKE 1 TABLET (0.25 MG TOTAL) BY MOUTH 2 (TWO) TIMES DAILY AS NEEDED FOR ANXIETY.    atorvastatin (LIPITOR) 20 MG tablet Take 1 tablet (20 mg total) by mouth every evening.    bumetanide (BUMEX) 1 MG tablet TAKE 1 TABLET BY MOUTH EVERY DAY    fluocinolone (DERMA-SMOOTHE) 0.01 % external oil Apply oil to scalp twice a day    fluticasone (FLONASE) 50 mcg/actuation nasal spray 2 sprays (100 mcg total) by Each Nare route once daily.    hydroxychloroquine (PLAQUENIL) 200 mg tablet Take 1 tablet (200 mg total) by mouth 2 (two) times daily.    levothyroxine (SYNTHROID) 88 MCG tablet Take 1 tablet (88 mcg total) by mouth once daily.    mycophenolate (CELLCEPT) 500 mg Tab Take 3 tablets (1,500 mg total) by mouth 2 (two) times daily.    predniSONE (DELTASONE) 10 MG tablet Take 1 tablet (10 mg total) by mouth once daily.    QUEtiapine (SEROQUEL) 50 MG tablet TAKE 1 TABLET BY MOUTH EVERY DAY AT NIGHT    sulfamethoxazole-trimethoprim 800-160mg (BACTRIM DS) 800-160 mg Tab One DS tablet three times weekly    TRULICITY 1.5 mg/0.5 mL pen injector INJECT 1.5 MG INTO THE SKIN EVERY 7 DAYS.   Last reviewed on 11/2/2020  4:52 PM by Yuly Webb    Review of patient's allergies indicates:   Allergen Reactions    Ceftriaxone Swelling     Patient states that BP spike when taken rocephin.     Citrus and derivatives     Codeine Swelling    Last reviewed on  11/2/2020 4:52 PM by Yuly Webb      Tasks added this encounter   11/25/2020 - Refill Call (Auto Added)   Tasks due within next 3 months   10/21/2020 - Clinical - Follow Up Assesement (90 day)     Yuly Webb  [facility]  [address] [place] [id number]  Phone: [phone]  Fax: [phone]

## 2020-11-03 ENCOUNTER — SPECIALTY PHARMACY (OUTPATIENT)
Dept: PHARMACY | Facility: CLINIC | Age: 40
End: 2020-11-03

## 2020-11-03 NOTE — TELEPHONE ENCOUNTER
Specialty Pharmacy - Clinical Reassessment    Specialty Medication Orders Linked to Encounter      Most Recent Value   Medication #1  mycophenolate (CELLCEPT) 500 mg Tab (Order#[id number], Rx#[id number])        Jagjit Luna is a 40 y.o. female, who is followed by the specialty pharmacy service for management and education.    Encounters since last clinical assessment   10/23/2020: Refill Coordination with Yuly Webb   Clinical call attempts since last clinical assessment   10/23/2020  9:00 PM - Specialty Pharmacy - Clinical Reassessment by Tor Maier PharmD  10/28/2020  4:05 PM - Specialty Pharmacy - Clinical Reassessment by Tor Maier, Edson     Today she received follow up education for her specialty medication(s).    Current Outpatient Medications   Medication Sig    albuterol 90 mcg/actuation inhaler Inhale 2 puffs into the lungs every 6 (six) hours as needed for Wheezing.    albuterol-ipratropium (DUO-NEB) 2.5 mg-0.5 mg/3 mL nebulizer solution TAKE 3 MLS BY NEBULIZATION EVERY 6 (SIX) HOURS AS NEEDED FOR WHEEZING. RESCUE    ALPRAZolam (XANAX) 0.25 MG tablet TAKE 1 TABLET (0.25 MG TOTAL) BY MOUTH 2 (TWO) TIMES DAILY AS NEEDED FOR ANXIETY.    bumetanide (BUMEX) 1 MG tablet TAKE 1 TABLET BY MOUTH EVERY DAY    fluocinolone (DERMA-SMOOTHE) 0.01 % external oil Apply oil to scalp twice a day    fluticasone (FLONASE) 50 mcg/actuation nasal spray 2 sprays (100 mcg total) by Each Nare route once daily.    hydroxychloroquine (PLAQUENIL) 200 mg tablet Take 1 tablet (200 mg total) by mouth 2 (two) times daily.    levothyroxine (SYNTHROID) 88 MCG tablet Take 1 tablet (88 mcg total) by mouth once daily.    mycophenolate (CELLCEPT) 500 mg Tab Take 3 tablets (1,500 mg total) by mouth 2 (two) times daily.    predniSONE (DELTASONE) 10 MG tablet Take 1 tablet (10 mg total) by mouth once daily.    QUEtiapine (SEROQUEL) 50 MG tablet TAKE 1 TABLET BY MOUTH EVERY DAY AT NIGHT     sulfamethoxazole-trimethoprim 800-160mg (BACTRIM DS) 800-160 mg Tab One DS tablet three times weekly    TRULICITY 1.5 mg/0.5 mL pen injector INJECT 1.5 MG INTO THE SKIN EVERY 7 DAYS.   Last reviewed on 11/3/2020 12:14 PM by Tor Maier, PharmD    Review of patient's allergies indicates:   Allergen Reactions    Ceftriaxone Swelling     Patient states that BP spike when taken rocephin.     Citrus and derivatives     Codeine Swelling   Last reviewed on  11/3/2020 12:14 PM by Tor Maier    Drug Interactions    Drug interactions evaluated: yes  Clinically relevant drug interactions identified: no  Provided the patient with educational material regarding drug interactions: not applicable       Medication Adherence    Patient reported X missed doses in the last month: 0  Any gaps in refill history greater than 2 weeks in the last 3 months: no  Demonstrates understanding of importance of adherence: yes  Informant: patient  Reliability of informant: reliable  Provider-estimated medication adherence level: good  Reasons for non-adherence: no problems identified  Support network for adherence: family member  Confirmed plan for next specialty medication refill: delivery by pharmacy  Refills needed for supportive medications: not needed         Assessment Questions - Documented Responses      Most Recent Value   Assessment   Medication Reconciliation completed for patient  Yes   During the past 4 weeks, has patient missed any activities due to condition or medication?  No   During the past 4 weeks, did patient have any of the following urgent care visits?  None   Goals of Therapy Status  Achieving   Welcome packet contents reviewed and discussed with patient?  No   Assesment completed?  Yes   Plan  Therapy continued   Do you need to open a clinical intervention (i-vent)?  No   Do you want to schedule first shipment?  No   Medication #1 Assessment Info   Patient status  Existing medication, Exisiting to [facility]   Is this  "medication appropriate for the patient?  Yes   Is this medication effective?  Yes          Objective    She has a past medical history of ADHD (attention deficit hyperactivity disorder), Asthma, Hypertension, Hypothyroidism, and TIA (transient ischemic attack).        BP Readings from Last 4 Encounters:   03/27/20 120/81   10/10/19 136/88   09/05/19 123/76   08/16/19 124/86     Ht Readings from Last 4 Encounters:   10/10/19 5' 5" (1.651 m)   09/05/19 5' 5" (1.651 m)   08/08/19 5' 5" (1.651 m)   08/08/19 5' 5" (1.651 m)     Wt Readings from Last 4 Encounters:   03/27/20 118.4 kg (261 lb)   10/10/19 121.2 kg (267 lb 3.2 oz)   09/05/19 119.7 kg (263 lb 14.3 oz)   08/08/19 118.5 kg (261 lb 3.9 oz)     No results for input(s): CREATININE, ALT, AST, HEPBSAG, HEPBSAB, HEPBCAB in the last 2160 hours.  The goals of prescribed drug therapy management include:  · Supporting patient to meet the prescriber's medical treatment objectives  · Improving or maintaining quality of life  · Maintaining optimal therapy adherence  · Minimizing and managing side effects      Goals of Therapy Status: Achieving    Assessment/Plan  Patient plans to continue therapy without changes      Indication, dosage, appropriateness, effectiveness, safety and convenience of her specialty medication(s) were reviewed today.     Patient Counseling    Counseled the patient on the following: doses and administration discussed, safe handling, storage, and disposal discussed, possible adverse effects and management discussed, possible drug and prescription drug interactions discussed, possible drug and OTC drug and food interactions discussed, lab monitoring and follow-up discussed, therapeutic rationale discussed, cost of medications and cost implications discussed, adherence and missed doses discussed, pharmacy contact information discussed       Cellcept follow up assessment was completed on 11/3/20. Pt is taking Cellcept for SLE. Pt states that she is doing " well on Cellcept, and has not had any flare ups recently. Pt's medication list was reviewed and reconciled. No DDIs. She verified the dose and frequency of her maintenance and PRN medications. Pt denies new conditions, new allergies, new medications, and recent ER / urgent care visits. Pt has not experienced any side effects from Cellcept. Pt had no further questions or concerns.    Tasks added this encounter   No tasks added.   Tasks due within next 3 months   10/21/2020 - Clinical - Follow Up Assesement (90 day)  11/25/2020 - Refill Call (Auto Added)     Edson Lentz  [facility] - [facility]ty Pharm[facility]  [address] [place] [id number]  Phone: [phone]  Fax: [phone]

## 2020-11-05 ENCOUNTER — SPECIALTY PHARMACY (OUTPATIENT)
Dept: PHARMACY | Facility: CLINIC | Age: 40
End: 2020-11-05

## 2020-11-06 ENCOUNTER — PATIENT MESSAGE (OUTPATIENT)
Dept: RHEUMATOLOGY | Facility: CLINIC | Age: 40
End: 2020-11-06

## 2020-11-16 ENCOUNTER — PATIENT MESSAGE (OUTPATIENT)
Dept: RHEUMATOLOGY | Facility: CLINIC | Age: 40
End: 2020-11-16

## 2020-11-16 ENCOUNTER — OFFICE VISIT (OUTPATIENT)
Dept: RHEUMATOLOGY | Facility: CLINIC | Age: 40
End: 2020-11-16
Payer: COMMERCIAL

## 2020-11-16 ENCOUNTER — TELEPHONE (OUTPATIENT)
Dept: RHEUMATOLOGY | Facility: CLINIC | Age: 40
End: 2020-11-16

## 2020-11-16 VITALS — WEIGHT: 255 LBS | BODY MASS INDEX: 42.43 KG/M2

## 2020-11-16 DIAGNOSIS — M32.13 OTHER SYSTEMIC LUPUS ERYTHEMATOSUS WITH LUNG INVOLVEMENT: Primary | ICD-10-CM

## 2020-11-16 DIAGNOSIS — Z79.899 LONG-TERM CURRENT USE OF HIGH RISK MEDICATION OTHER THAN ANTICOAGULANT: ICD-10-CM

## 2020-11-16 DIAGNOSIS — R76.8 ANTI-RNP ANTIBODIES PRESENT: ICD-10-CM

## 2020-11-16 DIAGNOSIS — D84.9 IMMUNOSUPPRESSED STATUS: ICD-10-CM

## 2020-11-16 DIAGNOSIS — J84.9 ILD (INTERSTITIAL LUNG DISEASE): ICD-10-CM

## 2020-11-16 PROCEDURE — 99214 OFFICE O/P EST MOD 30 MIN: CPT | Mod: 95,,, | Performed by: INTERNAL MEDICINE

## 2020-11-16 PROCEDURE — 99214 PR OFFICE/OUTPT VISIT, EST, LEVL IV, 30-39 MIN: ICD-10-PCS | Mod: 95,,, | Performed by: INTERNAL MEDICINE

## 2020-11-16 PROCEDURE — 3008F PR BODY MASS INDEX (BMI) DOCUMENTED: ICD-10-PCS | Mod: CPTII,,, | Performed by: INTERNAL MEDICINE

## 2020-11-16 PROCEDURE — 3008F BODY MASS INDEX DOCD: CPT | Mod: CPTII,,, | Performed by: INTERNAL MEDICINE

## 2020-11-16 RX ORDER — HYDROXYCHLOROQUINE SULFATE 200 MG/1
200 TABLET, FILM COATED ORAL 2 TIMES DAILY
Qty: 60 TABLET | Refills: 6 | Status: SHIPPED | OUTPATIENT
Start: 2020-11-16 | End: 2021-02-18 | Stop reason: SDUPTHER

## 2020-11-16 RX ORDER — MYCOPHENOLATE MOFETIL 500 MG/1
1500 TABLET ORAL 2 TIMES DAILY
Qty: 180 TABLET | Refills: 2 | Status: SHIPPED | OUTPATIENT
Start: 2020-11-16 | End: 2020-11-16

## 2020-11-16 RX ORDER — PREDNISONE 20 MG/1
20 TABLET ORAL DAILY
Qty: 30 TABLET | Refills: 3 | Status: SHIPPED | OUTPATIENT
Start: 2020-11-16 | End: 2021-02-18

## 2020-11-16 RX ORDER — MYCOPHENOLATE MOFETIL 500 MG/1
1500 TABLET ORAL 2 TIMES DAILY
Qty: 180 TABLET | Refills: 2 | Status: SHIPPED | OUTPATIENT
Start: 2020-11-16 | End: 2021-02-18 | Stop reason: SDUPTHER

## 2020-11-16 NOTE — PROGRESS NOTES
RHEUMATOLOGY FOLLOW UP - TELE VISIT     The patient location is: [place]  The chief complaint leading to consultation is: Lupus, cough, shortness of breath.  Visit type: Virtual visit with synchronous audio and video  Total time spent with patient: 10 minutes  Each patient to whom he or she provides medical services by telemedicine is:  (1) informed of the relationship between the physician and patient and the respective role of any other health care provider with respect to management of the patient; and (2) notified that he or she may decline to receive medical services by telemedicine and may withdraw from such care at any time.    HPI:-  Daija Garcia a 40 y.o. pleasant female seen today through My chart video visit for follow up.  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  Since last visit her only problem worse worsening shortness of breath and wheezing every time she takes prednisone less than 20 mg. symptoms resolved when she increases the dose back to 20 mg. Her lupus arthritis is well controlled on combination of Plaquenil and CellCept.  No joint pain today.  No prolonged morning stiffness.  No chest pain.  No significant leg swelling.   No adverse effects from CellCept or Plaquenil.  On prednisone 20 mg daily.    Review of Systems   Constitutional: Positive for malaise/fatigue. Negative for chills and fever.   HENT: Negative for congestion and sore throat.    Eyes: Negative for blurred vision and redness.   Respiratory: Positive for cough, shortness of breath and wheezing.    Cardiovascular: Positive for leg swelling. Negative for chest pain.   Gastrointestinal: Positive for constipation. Negative for abdominal pain and diarrhea.   Genitourinary: Negative for dysuria.   Musculoskeletal: Negative for back pain, falls, joint pain, myalgias and neck pain.   Skin: Negative for rash.   Neurological: Negative for headaches.    Endo/Heme/Allergies: Does not bruise/bleed easily.   Psychiatric/Behavioral: Negative for memory loss. The patient does not have insomnia.        Past Medical History:   Diagnosis Date    ADHD (attention deficit hyperactivity disorder)     Asthma     Hypertension     Hypothyroidism     TIA (transient ischemic attack)        Past Surgical History:   Procedure Laterality Date    BRONCHOSCOPY Bilateral 8/16/2019    Procedure: Bronchoscopy;  Surgeon: Virgilio Weiss MD;  Location: [facility];  Service: Endoscopy;  Laterality: Bilateral;        Social History     Tobacco Use    Smoking status: Never Smoker    Smokeless tobacco: Never Used   Substance Use Topics    Alcohol use: Not Currently     Comment: social    Drug use: No       Family History   Problem Relation Age of Onset    Cancer Maternal Aunt         Breast, Back, Lung Cancer    Cancer Father        Review of patient's allergies indicates:   Allergen Reactions    Ceftriaxone Swelling     Patient states that BP spike when taken rocephin.     Citrus and derivatives     Codeine Swelling       Physical exam:-    GEN: awake, alert, non-diaphoretic, no psychomotor agitation, no acute distress    HEENT :Head: atraumatic, normocephalic, no rashes noted, no lesions noted;    Eyes: NO redness, discharge, swelling, or lesions    Nose: NO redness, swelling, discharge, deformity, or impetigo/crusting    Skin: no lesions, wounds, erythema, or cyanosis noted on face or hands    Cardiopulmonary: no increased respiratory effort, speaking in clear sentences    MSK: normal ROM in the neck, Upper extremities, Lower extremities  Good ROM of hands, fist formation 100% and , no obvious synovitis    Good ambulation in front of the camera    Neuro: cranial nerves grossly normal, speech normal rate and rhythm, orientation arrived to appointment on time with no prompting, moved both upper extremities equally    Pysch:  appearance, behavior, and attitude- well groomed,  pleasant, cooperative    Medication List with Changes/Refills   Current Medications    ALBUTEROL 90 MCG/ACTUATION INHALER    Inhale 2 puffs into the lungs every 6 (six) hours as needed for Wheezing.    ALBUTEROL-IPRATROPIUM (DUO-NEB) 2.5 MG-0.5 MG/3 ML NEBULIZER SOLUTION    TAKE 3 MLS BY NEBULIZATION EVERY 6 (SIX) HOURS AS NEEDED FOR WHEEZING. RESCUE    ALPRAZOLAM (XANAX) 0.25 MG TABLET    TAKE 1 TABLET (0.25 MG TOTAL) BY MOUTH 2 (TWO) TIMES DAILY AS NEEDED FOR ANXIETY.    BUMETANIDE (BUMEX) 1 MG TABLET    TAKE 1 TABLET BY MOUTH EVERY DAY    FLUOCINOLONE (DERMA-SMOOTHE) 0.01 % EXTERNAL OIL    Apply oil to scalp twice a day    FLUTICASONE (FLONASE) 50 MCG/ACTUATION NASAL SPRAY    2 sprays (100 mcg total) by Each Nare route once daily.    LEVOTHYROXINE (SYNTHROID) 88 MCG TABLET    Take 1 tablet (88 mcg total) by mouth once daily.    QUETIAPINE (SEROQUEL) 50 MG TABLET    TAKE 1 TABLET BY MOUTH EVERY DAY AT NIGHT    SULFAMETHOXAZOLE-TRIMETHOPRIM 800-160MG (BACTRIM DS) 800-160 MG TAB    One DS tablet three times weekly    TRULICITY 1.5 MG/0.5 ML PEN INJECTOR    INJECT 1.5 MG INTO THE SKIN EVERY 7 DAYS.   Changed and/or Refilled Medications    Modified Medication Previous Medication    HYDROXYCHLOROQUINE (PLAQUENIL) 200 MG TABLET hydroxychloroquine (PLAQUENIL) 200 mg tablet       Take 1 tablet (200 mg total) by mouth 2 (two) times daily.    Take 1 tablet (200 mg total) by mouth 2 (two) times daily.    MYCOPHENOLATE (CELLCEPT) 500 MG TAB mycophenolate (CELLCEPT) 500 mg Tab       Take 3 tablets (1,500 mg total) by mouth 2 (two) times daily.    Take 3 tablets (1,500 mg total) by mouth 2 (two) times daily.    PREDNISONE (DELTASONE) 20 MG TABLET predniSONE (DELTASONE) 10 MG tablet       Take 1 tablet (20 mg total) by mouth once daily.    Take 1 tablet (10 mg total) by mouth once daily.       Assessment/Plans:-  1. Other systemic lupus erythematosus with lung involvement    2. ILD (interstitial lung disease)    3. Anti-RNP  antibodies present    4. Long-term current use of high risk medication other than anticoagulant    5. Immunosuppressed status     Stable lupus arthritis on CellCept and Plaquenil without any active disease except reactive airway disease needing higher dose of prednisone.  I have reached out to our pulmonologist Dr. Nails to get some help in managing her reactive airway disease and to taper systemic prednisone use.  Continue CellCept and Plaquenil with prednisone for now.  CBC, CMP, ESR, CRP done at outside clinic last week of October have returned within normal limits.  Compromised immune system secondary to autoimmune disease and use of immunosuppressive drugs. Monitor carefully for infections. Advised to get immediate medical care if any infection. Also advised strict adherence to age appropriate vaccinations and cancer screenings with PCP.   Problem List Items Addressed This Visit     ILD (interstitial lung disease)    Relevant Medications    mycophenolate (CELLCEPT) 500 mg Tab    hydrOXYchloroQUINE (PLAQUENIL) 200 mg tablet    Systemic lupus erythematosus with lung involvement - Primary    Relevant Medications    mycophenolate (CELLCEPT) 500 mg Tab    predniSONE (DELTASONE) 20 MG tablet    hydrOXYchloroQUINE (PLAQUENIL) 200 mg tablet    Anti-RNP antibodies present    Long-term current use of high risk medication other than anticoagulant    Immunosuppressed status          Follow up in about 3 months (around 2/16/2021).    Disclaimer: This note was prepared using voice recognition system and is likely to have sound alike errors and is not proof read.  Please call me with any questions.

## 2020-11-16 NOTE — TELEPHONE ENCOUNTER
----- Message from Stephanie Mchugh sent at 11/16/2020  3:59 PM CST -----  Type:  Needs Medical Advice    Who Called:  Pt  Tammohitla   Symptoms (please be specific):  Pt has a virtual at 4:15pm this afternoon//she had labs that she wants Dr to see at her appt//is wanting to e mail to you    How long has patient had these symptoms:     Pharmacy name and phone #:     Would the patient rather a call back or a response via [facility]?   Call back   Best Call Back Number:    [phone]  Additional Information:  please call [email or web address]

## 2020-11-23 ENCOUNTER — PATIENT MESSAGE (OUTPATIENT)
Dept: RHEUMATOLOGY | Facility: CLINIC | Age: 40
End: 2020-11-23

## 2020-11-24 ENCOUNTER — TELEPHONE (OUTPATIENT)
Dept: PULMONOLOGY | Facility: CLINIC | Age: 40
End: 2020-11-24

## 2020-11-24 DIAGNOSIS — R05.9 COUGH: Primary | ICD-10-CM

## 2020-11-24 NOTE — TELEPHONE ENCOUNTER
Spoke with pt. Appt scheduled. Pt stated that she is switching insurances and will let me know if she will need to push appt back.

## 2020-11-24 NOTE — TELEPHONE ENCOUNTER
----- Message from Virgilio Weiss MD sent at 11/16/2020  4:53 PM CST -----  Regarding: RE: Constant need for higher prednisone dose  I have not seen her since August 2019  She probably needs medication optimization.  Office visit with spirometry  Documentation is required before any biologics can be added for asthma  ----- Message -----  From: Bob Willis MD  Sent: 11/16/2020   4:36 PM CST  To: Virgilio Weiss MD  Subject: Constant need for higher prednisone dose         Alhajilo Dr.Mula   [facility] has recurrent worsening of her wheezing every time we reduce the systemic prednisone dose to anything lesser than 20 mg , even though her connective tissue activity markers are within normal limits. Is there an option for her to try stronger locally acting bronchodilators/ inhaled steroid which might help me taper off her prednisone.   Appreciate your help.     Regards  Dipti Willis.

## 2020-11-27 ENCOUNTER — SPECIALTY PHARMACY (OUTPATIENT)
Dept: PHARMACY | Facility: CLINIC | Age: 40
End: 2020-11-27

## 2020-11-27 NOTE — TELEPHONE ENCOUNTER
Specialty Pharmacy - Refill Coordination    Specialty Medication Orders Linked to Encounter      Most Recent Value   Medication #1  mycophenolate (CELLCEPT) 500 mg Tab (Order#[id number], Rx#[id number])          Refill Questions - Documented Responses      Most Recent Value   Relationship to patient of person spoken to?  Self   HIPAA/medical authority confirmed?  Yes   Any changes in contact preferences or allowed representatives?  No   Has the patient had any insurance changes?  No   Has the patient had any changes to specialty medication, dose, or instructions?  No   Has the patient started taking any new medications, herbals, or supplements?  No   Has the patient been diagnosed with any new medical conditions?  No   Does the patient have any new allergies to medications or foods?  No   Does the patient have any concerns about side effects?  No   Can the patient store medication/sharps container properly (at the correct temperature, away from children/pets, etc.)?  Yes   Can the patient call emergency services (911) in the event of an emergency?  Yes   Does the patient have any concerns or questions about taking or administering this medication as prescribed?  No   How many doses did the patient miss in the past 4 weeks or since the last fill?  0   How many doses does the patient have on hand?  7   How many days does the patient report on hand quantity will last?  7   Does the number of doses/days supply remaining match pharmacy expected amounts?  Yes   Does the patient feel that this medication is effective?  Yes   During the past 4 weeks, has patient missed any activities due to condition or medication?  No   During the past 4 weeks, did patient have any of the following urgent care visits?  None   How will the patient receive the medication?  Mail   When does the patient need to receive the medication?  12/04/20   Shipping Address  Home   Address in [facility] confirmed and updated if neccessary?  Yes    Expected Copay ($)  56.89   Is the patient able to afford the medication copay?  Yes   Payment Method  CC on file   Days supply of Refill  56   Would patient like to speak to a pharmacist?  No   Do you want to trigger an intervention?  No   Do you want to trigger an additional referral task?  No   Refill activity completed?  Yes   Refill activity plan  Refill scheduled   Shipment/Pickup Date:  11/30/20          Current Outpatient Medications   Medication Sig    albuterol 90 mcg/actuation inhaler Inhale 2 puffs into the lungs every 6 (six) hours as needed for Wheezing.    albuterol-ipratropium (DUO-NEB) 2.5 mg-0.5 mg/3 mL nebulizer solution TAKE 3 MLS BY NEBULIZATION EVERY 6 (SIX) HOURS AS NEEDED FOR WHEEZING. RESCUE    ALPRAZolam (XANAX) 0.25 MG tablet TAKE 1 TABLET (0.25 MG TOTAL) BY MOUTH 2 (TWO) TIMES DAILY AS NEEDED FOR ANXIETY.    bumetanide (BUMEX) 1 MG tablet TAKE 1 TABLET BY MOUTH EVERY DAY    fluocinolone (DERMA-SMOOTHE) 0.01 % external oil Apply oil to scalp twice a day    fluticasone (FLONASE) 50 mcg/actuation nasal spray 2 sprays (100 mcg total) by Each Nare route once daily.    hydrOXYchloroQUINE (PLAQUENIL) 200 mg tablet Take 1 tablet (200 mg total) by mouth 2 (two) times daily.    levothyroxine (SYNTHROID) 88 MCG tablet Take 1 tablet (88 mcg total) by mouth once daily.    mycophenolate (CELLCEPT) 500 mg Tab Take 3 tablets (1,500 mg total) by mouth 2 (two) times daily.    predniSONE (DELTASONE) 20 MG tablet Take 1 tablet (20 mg total) by mouth once daily.    QUEtiapine (SEROQUEL) 50 MG tablet TAKE 1 TABLET BY MOUTH EVERY DAY AT NIGHT    sulfamethoxazole-trimethoprim 800-160mg (BACTRIM DS) 800-160 mg Tab One DS tablet three times weekly    TRULICITY 1.5 mg/0.5 mL pen injector INJECT 1.5 MG INTO THE SKIN EVERY 7 DAYS.   Last reviewed on 11/16/2020  4:25 PM by Bob Willis MD    Review of patient's allergies indicates:   Allergen Reactions    Ceftriaxone Swelling     Patient  states that BP spike when taken rocephin.     Citrus and derivatives     Codeine Swelling    Last reviewed on  11/16/2020 4:25 PM by Bob Willis      Tasks added this encounter   No tasks added.   Tasks due within next 3 months   11/25/2020 - Refill Call (Auto Added)  1/25/2021 - Clinical - Follow Up Assesement (90 day)     Aurora Schwab  [facility] - Specialty Pharm[facility]  [address] [place] [id number]  Phone: [phone]  Fax: [phone]

## 2020-12-29 DIAGNOSIS — J84.9 ILD (INTERSTITIAL LUNG DISEASE): Primary | ICD-10-CM

## 2021-01-05 ENCOUNTER — PATIENT MESSAGE (OUTPATIENT)
Dept: PULMONOLOGY | Facility: CLINIC | Age: 41
End: 2021-01-05

## 2021-01-20 ENCOUNTER — PATIENT MESSAGE (OUTPATIENT)
Dept: RHEUMATOLOGY | Facility: CLINIC | Age: 41
End: 2021-01-20

## 2021-01-21 ENCOUNTER — SPECIALTY PHARMACY (OUTPATIENT)
Dept: PHARMACY | Facility: CLINIC | Age: 41
End: 2021-01-21

## 2021-01-21 RX ORDER — DEXTROAMPHETAMINE SACCHARATE, AMPHETAMINE ASPARTATE MONOHYDRATE, DEXTROAMPHETAMINE SULFATE AND AMPHETAMINE SULFATE 3.75; 3.75; 3.75; 3.75 MG/1; MG/1; MG/1; MG/1
15 CAPSULE, EXTENDED RELEASE ORAL EVERY MORNING
COMMUNITY
End: 2021-05-06

## 2021-01-22 ENCOUNTER — PATIENT MESSAGE (OUTPATIENT)
Dept: RHEUMATOLOGY | Facility: CLINIC | Age: 41
End: 2021-01-22

## 2021-01-27 ENCOUNTER — PATIENT MESSAGE (OUTPATIENT)
Dept: RHEUMATOLOGY | Facility: CLINIC | Age: 41
End: 2021-01-27

## 2021-01-27 DIAGNOSIS — R34 OLIGURIA: Primary | ICD-10-CM

## 2021-01-29 ENCOUNTER — PATIENT MESSAGE (OUTPATIENT)
Dept: RHEUMATOLOGY | Facility: CLINIC | Age: 41
End: 2021-01-29

## 2021-02-01 ENCOUNTER — PATIENT MESSAGE (OUTPATIENT)
Dept: RHEUMATOLOGY | Facility: CLINIC | Age: 41
End: 2021-02-01

## 2021-02-08 ENCOUNTER — LAB VISIT (OUTPATIENT)
Dept: LAB | Facility: HOSPITAL | Age: 41
End: 2021-02-08
Attending: INTERNAL MEDICINE
Payer: COMMERCIAL

## 2021-02-08 DIAGNOSIS — J84.9 ILD (INTERSTITIAL LUNG DISEASE): ICD-10-CM

## 2021-02-08 DIAGNOSIS — E03.9 HYPOTHYROIDISM, ADULT: ICD-10-CM

## 2021-02-08 DIAGNOSIS — E55.9 VITAMIN D DEFICIENCY DISEASE: ICD-10-CM

## 2021-02-08 DIAGNOSIS — E78.5 HYPERLIPEMIA: Primary | ICD-10-CM

## 2021-02-08 DIAGNOSIS — M32.13 OTHER SYSTEMIC LUPUS ERYTHEMATOSUS WITH LUNG INVOLVEMENT: ICD-10-CM

## 2021-02-08 DIAGNOSIS — E16.8 OTHER SPECIFIED DISORDERS OF PANCREATIC INTERNAL SECRETION: ICD-10-CM

## 2021-02-08 LAB
ALBUMIN SERPL BCP-MCNC: 3.8 G/DL (ref 3.5–5.2)
ALBUMIN SERPL BCP-MCNC: 3.9 G/DL (ref 3.5–5.2)
ALP SERPL-CCNC: 68 U/L (ref 55–135)
ALP SERPL-CCNC: 71 U/L (ref 55–135)
ALT SERPL W/O P-5'-P-CCNC: 17 U/L (ref 10–44)
ALT SERPL W/O P-5'-P-CCNC: 19 U/L (ref 10–44)
ANION GAP SERPL CALC-SCNC: 10 MMOL/L (ref 8–16)
ANION GAP SERPL CALC-SCNC: 10 MMOL/L (ref 8–16)
AST SERPL-CCNC: 24 U/L (ref 10–40)
AST SERPL-CCNC: 25 U/L (ref 10–40)
BASOPHILS # BLD AUTO: 0.05 K/UL (ref 0–0.2)
BASOPHILS NFR BLD: 0.4 % (ref 0–1.9)
BILIRUB SERPL-MCNC: 0.3 MG/DL (ref 0.1–1)
BILIRUB SERPL-MCNC: 0.3 MG/DL (ref 0.1–1)
BUN SERPL-MCNC: 8 MG/DL (ref 6–20)
BUN SERPL-MCNC: 9 MG/DL (ref 6–20)
CALCIUM SERPL-MCNC: 9.1 MG/DL (ref 8.7–10.5)
CALCIUM SERPL-MCNC: 9.2 MG/DL (ref 8.7–10.5)
CHLORIDE SERPL-SCNC: 101 MMOL/L (ref 95–110)
CHLORIDE SERPL-SCNC: 103 MMOL/L (ref 95–110)
CHOLEST SERPL-MCNC: 202 MG/DL (ref 120–199)
CHOLEST/HDLC SERPL: 2.7 {RATIO} (ref 2–5)
CO2 SERPL-SCNC: 26 MMOL/L (ref 23–29)
CO2 SERPL-SCNC: 27 MMOL/L (ref 23–29)
CREAT SERPL-MCNC: 0.9 MG/DL (ref 0.5–1.4)
CREAT SERPL-MCNC: 0.9 MG/DL (ref 0.5–1.4)
CRP SERPL-MCNC: 3.3 MG/L (ref 0–8.2)
DIFFERENTIAL METHOD: ABNORMAL
EOSINOPHIL # BLD AUTO: 0.1 K/UL (ref 0–0.5)
EOSINOPHIL NFR BLD: 0.4 % (ref 0–8)
ERYTHROCYTE [DISTWIDTH] IN BLOOD BY AUTOMATED COUNT: 15.5 % (ref 11.5–14.5)
ERYTHROCYTE [SEDIMENTATION RATE] IN BLOOD BY WESTERGREN METHOD: 25 MM/HR (ref 0–20)
EST. GFR  (AFRICAN AMERICAN): >60 ML/MIN/1.73 M^2
EST. GFR  (AFRICAN AMERICAN): >60 ML/MIN/1.73 M^2
EST. GFR  (NON AFRICAN AMERICAN): >60 ML/MIN/1.73 M^2
EST. GFR  (NON AFRICAN AMERICAN): >60 ML/MIN/1.73 M^2
ESTIMATED AVG GLUCOSE: 117 MG/DL (ref 68–131)
GLUCOSE SERPL-MCNC: 86 MG/DL (ref 70–110)
GLUCOSE SERPL-MCNC: 89 MG/DL (ref 70–110)
HBA1C MFR BLD: 5.7 % (ref 4–5.6)
HCT VFR BLD AUTO: 38.2 % (ref 37–48.5)
HCT VFR BLD AUTO: 38.3 % (ref 37–48.5)
HDLC SERPL-MCNC: 74 MG/DL (ref 40–75)
HDLC SERPL: 36.6 % (ref 20–50)
HGB BLD-MCNC: 11.3 G/DL (ref 12–16)
HGB BLD-MCNC: 11.5 G/DL (ref 12–16)
IMM GRANULOCYTES # BLD AUTO: 0.05 K/UL (ref 0–0.04)
IMM GRANULOCYTES NFR BLD AUTO: 0.4 % (ref 0–0.5)
LDLC SERPL CALC-MCNC: 113.8 MG/DL (ref 63–159)
LYMPHOCYTES # BLD AUTO: 2.2 K/UL (ref 1–4.8)
LYMPHOCYTES NFR BLD: 18.4 % (ref 18–48)
MCH RBC QN AUTO: 27.2 PG (ref 27–31)
MCHC RBC AUTO-ENTMCNC: 30.1 G/DL (ref 32–36)
MCV RBC AUTO: 90 FL (ref 82–98)
MONOCYTES # BLD AUTO: 0.5 K/UL (ref 0.3–1)
MONOCYTES NFR BLD: 4.4 % (ref 4–15)
NEUTROPHILS # BLD AUTO: 9.2 K/UL (ref 1.8–7.7)
NEUTROPHILS NFR BLD: 76 % (ref 38–73)
NONHDLC SERPL-MCNC: 128 MG/DL
NRBC BLD-RTO: 0 /100 WBC
PLATELET # BLD AUTO: 313 K/UL (ref 150–350)
PMV BLD AUTO: 11.2 FL (ref 9.2–12.9)
POTASSIUM SERPL-SCNC: 3.9 MMOL/L (ref 3.5–5.1)
POTASSIUM SERPL-SCNC: 3.9 MMOL/L (ref 3.5–5.1)
PROT SERPL-MCNC: 7.3 G/DL (ref 6–8.4)
PROT SERPL-MCNC: 7.3 G/DL (ref 6–8.4)
RBC # BLD AUTO: 4.23 M/UL (ref 4–5.4)
SODIUM SERPL-SCNC: 138 MMOL/L (ref 136–145)
SODIUM SERPL-SCNC: 139 MMOL/L (ref 136–145)
TRIGL SERPL-MCNC: 71 MG/DL (ref 30–150)
WBC # BLD AUTO: 12.13 K/UL (ref 3.9–12.7)

## 2021-02-08 PROCEDURE — 84439 ASSAY OF FREE THYROXINE: CPT

## 2021-02-08 PROCEDURE — 84481 FREE ASSAY (FT-3): CPT

## 2021-02-08 PROCEDURE — 83036 HEMOGLOBIN GLYCOSYLATED A1C: CPT

## 2021-02-08 PROCEDURE — 82306 VITAMIN D 25 HYDROXY: CPT

## 2021-02-08 PROCEDURE — 85025 COMPLETE CBC W/AUTO DIFF WBC: CPT

## 2021-02-08 PROCEDURE — 86160 COMPLEMENT ANTIGEN: CPT

## 2021-02-08 PROCEDURE — 85014 HEMATOCRIT: CPT

## 2021-02-08 PROCEDURE — 85651 RBC SED RATE NONAUTOMATED: CPT

## 2021-02-08 PROCEDURE — 85018 HEMOGLOBIN: CPT

## 2021-02-08 PROCEDURE — 86160 COMPLEMENT ANTIGEN: CPT | Mod: 59

## 2021-02-08 PROCEDURE — 84443 ASSAY THYROID STIM HORMONE: CPT

## 2021-02-08 PROCEDURE — 80061 LIPID PANEL: CPT

## 2021-02-08 PROCEDURE — 36415 COLL VENOUS BLD VENIPUNCTURE: CPT | Mod: PO

## 2021-02-08 PROCEDURE — 86140 C-REACTIVE PROTEIN: CPT

## 2021-02-08 PROCEDURE — 86225 DNA ANTIBODY NATIVE: CPT

## 2021-02-08 PROCEDURE — 80053 COMPREHEN METABOLIC PANEL: CPT

## 2021-02-09 LAB
25(OH)D3+25(OH)D2 SERPL-MCNC: 25 NG/ML (ref 30–96)
C3 SERPL-MCNC: 151 MG/DL (ref 50–180)
C4 SERPL-MCNC: 44 MG/DL (ref 11–44)
DSDNA AB SER-ACNC: NORMAL [IU]/ML
T3FREE SERPL-MCNC: 2 PG/ML (ref 2.3–4.2)
T4 FREE SERPL-MCNC: 0.95 NG/DL (ref 0.71–1.51)
TSH SERPL DL<=0.005 MIU/L-ACNC: 0.88 UIU/ML (ref 0.4–4)

## 2021-02-16 ENCOUNTER — PATIENT MESSAGE (OUTPATIENT)
Dept: RHEUMATOLOGY | Facility: CLINIC | Age: 41
End: 2021-02-16

## 2021-02-17 ENCOUNTER — TELEPHONE (OUTPATIENT)
Dept: RHEUMATOLOGY | Facility: CLINIC | Age: 41
End: 2021-02-17

## 2021-02-18 ENCOUNTER — PATIENT MESSAGE (OUTPATIENT)
Dept: RHEUMATOLOGY | Facility: CLINIC | Age: 41
End: 2021-02-18

## 2021-02-18 ENCOUNTER — OFFICE VISIT (OUTPATIENT)
Dept: RHEUMATOLOGY | Facility: CLINIC | Age: 41
End: 2021-02-18
Payer: COMMERCIAL

## 2021-02-18 DIAGNOSIS — R76.8 ANTI-RNP ANTIBODIES PRESENT: ICD-10-CM

## 2021-02-18 DIAGNOSIS — Z29.89 NEED FOR PNEUMOCYSTIS PROPHYLAXIS: ICD-10-CM

## 2021-02-18 DIAGNOSIS — Z79.899 HIGH RISK MEDICATION USE: ICD-10-CM

## 2021-02-18 DIAGNOSIS — M32.13 OTHER SYSTEMIC LUPUS ERYTHEMATOSUS WITH LUNG INVOLVEMENT: Primary | ICD-10-CM

## 2021-02-18 DIAGNOSIS — J84.9 ILD (INTERSTITIAL LUNG DISEASE): ICD-10-CM

## 2021-02-18 DIAGNOSIS — Z51.81 ENCOUNTER FOR MEDICATION MONITORING: ICD-10-CM

## 2021-02-18 PROCEDURE — 99215 PR OFFICE/OUTPT VISIT, EST, LEVL V, 40-54 MIN: ICD-10-PCS | Mod: 95,,, | Performed by: INTERNAL MEDICINE

## 2021-02-18 PROCEDURE — 99215 OFFICE O/P EST HI 40 MIN: CPT | Mod: 95,,, | Performed by: INTERNAL MEDICINE

## 2021-02-18 RX ORDER — PREDNISONE 10 MG/1
10 TABLET ORAL DAILY
Qty: 90 TABLET | Refills: 0 | Status: SHIPPED | OUTPATIENT
Start: 2021-02-18 | End: 2021-05-06

## 2021-02-18 RX ORDER — HYDROXYCHLOROQUINE SULFATE 200 MG/1
200 TABLET, FILM COATED ORAL 2 TIMES DAILY
Qty: 60 TABLET | Refills: 6 | Status: SHIPPED | OUTPATIENT
Start: 2021-02-18 | End: 2023-12-10 | Stop reason: SDUPTHER

## 2021-02-18 RX ORDER — SULFAMETHOXAZOLE AND TRIMETHOPRIM 800; 160 MG/1; MG/1
TABLET ORAL
Qty: 36 TABLET | Refills: 1 | Status: SHIPPED | OUTPATIENT
Start: 2021-02-18 | End: 2021-09-07

## 2021-02-18 RX ORDER — MYCOPHENOLATE MOFETIL 500 MG/1
1500 TABLET ORAL 2 TIMES DAILY
Qty: 180 TABLET | Refills: 2 | Status: SHIPPED | OUTPATIENT
Start: 2021-02-18 | End: 2021-06-02 | Stop reason: SDUPTHER

## 2021-02-19 ENCOUNTER — TELEPHONE (OUTPATIENT)
Dept: RHEUMATOLOGY | Facility: CLINIC | Age: 41
End: 2021-02-19

## 2021-02-23 ENCOUNTER — PATIENT MESSAGE (OUTPATIENT)
Dept: PHARMACY | Facility: CLINIC | Age: 41
End: 2021-02-23

## 2021-02-23 ENCOUNTER — TELEPHONE (OUTPATIENT)
Dept: RHEUMATOLOGY | Facility: CLINIC | Age: 41
End: 2021-02-23

## 2021-03-03 ENCOUNTER — SPECIALTY PHARMACY (OUTPATIENT)
Dept: PHARMACY | Facility: CLINIC | Age: 41
End: 2021-03-03

## 2021-03-25 ENCOUNTER — TELEPHONE (OUTPATIENT)
Dept: UROLOGY | Facility: CLINIC | Age: 41
End: 2021-03-25

## 2021-03-26 ENCOUNTER — SPECIALTY PHARMACY (OUTPATIENT)
Dept: PHARMACY | Facility: CLINIC | Age: 41
End: 2021-03-26

## 2021-04-23 ENCOUNTER — SPECIALTY PHARMACY (OUTPATIENT)
Dept: PHARMACY | Facility: CLINIC | Age: 41
End: 2021-04-23

## 2021-04-27 ENCOUNTER — PATIENT MESSAGE (OUTPATIENT)
Dept: UROLOGY | Facility: CLINIC | Age: 41
End: 2021-04-27

## 2021-04-28 ENCOUNTER — PATIENT MESSAGE (OUTPATIENT)
Dept: RESEARCH | Facility: HOSPITAL | Age: 41
End: 2021-04-28

## 2021-05-03 DIAGNOSIS — G45.9 TIA (TRANSIENT ISCHEMIC ATTACK): Primary | ICD-10-CM

## 2021-05-03 DIAGNOSIS — I10 ESSENTIAL HYPERTENSION, BENIGN: ICD-10-CM

## 2021-05-03 DIAGNOSIS — R00.2 PALPITATION: ICD-10-CM

## 2021-05-04 ENCOUNTER — PATIENT MESSAGE (OUTPATIENT)
Dept: RHEUMATOLOGY | Facility: CLINIC | Age: 41
End: 2021-05-04

## 2021-05-06 ENCOUNTER — PATIENT MESSAGE (OUTPATIENT)
Dept: RHEUMATOLOGY | Facility: CLINIC | Age: 41
End: 2021-05-06

## 2021-05-06 ENCOUNTER — LAB VISIT (OUTPATIENT)
Dept: LAB | Facility: HOSPITAL | Age: 41
End: 2021-05-06
Attending: INTERNAL MEDICINE
Payer: COMMERCIAL

## 2021-05-06 ENCOUNTER — LAB VISIT (OUTPATIENT)
Dept: LAB | Facility: HOSPITAL | Age: 41
End: 2021-05-06
Attending: NURSE PRACTITIONER
Payer: COMMERCIAL

## 2021-05-06 ENCOUNTER — HOSPITAL ENCOUNTER (OUTPATIENT)
Dept: RADIOLOGY | Facility: HOSPITAL | Age: 41
Discharge: HOME | End: 2021-05-06
Attending: INTERNAL MEDICINE
Payer: COMMERCIAL

## 2021-05-06 ENCOUNTER — TELEPHONE (OUTPATIENT)
Dept: PULMONOLOGY | Facility: CLINIC | Age: 41
End: 2021-05-06

## 2021-05-06 ENCOUNTER — OFFICE VISIT (OUTPATIENT)
Dept: SLEEP MEDICINE | Facility: CLINIC | Age: 41
End: 2021-05-06
Payer: COMMERCIAL

## 2021-05-06 VITALS
SYSTOLIC BLOOD PRESSURE: 125 MMHG | RESPIRATION RATE: 18 BRPM | HEIGHT: 65 IN | OXYGEN SATURATION: 96 % | HEART RATE: 99 BPM | BODY MASS INDEX: 41.51 KG/M2 | WEIGHT: 249.13 LBS | DIASTOLIC BLOOD PRESSURE: 80 MMHG

## 2021-05-06 DIAGNOSIS — R05.3 CHRONIC COUGH: ICD-10-CM

## 2021-05-06 DIAGNOSIS — E03.9 HYPOTHYROIDISM: ICD-10-CM

## 2021-05-06 DIAGNOSIS — M32.13 OTHER SYSTEMIC LUPUS ERYTHEMATOSUS WITH LUNG INVOLVEMENT: ICD-10-CM

## 2021-05-06 DIAGNOSIS — R05.9 COUGH: Primary | ICD-10-CM

## 2021-05-06 DIAGNOSIS — J84.9 ILD (INTERSTITIAL LUNG DISEASE): ICD-10-CM

## 2021-05-06 DIAGNOSIS — I51.9 MYXEDEMA HEART DISEASE: ICD-10-CM

## 2021-05-06 DIAGNOSIS — J43.9 MIXED RESTRICTIVE AND OBSTRUCTIVE LUNG DISEASE: ICD-10-CM

## 2021-05-06 DIAGNOSIS — J98.4 MIXED RESTRICTIVE AND OBSTRUCTIVE LUNG DISEASE: ICD-10-CM

## 2021-05-06 DIAGNOSIS — I10 ESSENTIAL HYPERTENSION, BENIGN: ICD-10-CM

## 2021-05-06 DIAGNOSIS — R07.9 ACUTE CHEST PAIN: ICD-10-CM

## 2021-05-06 DIAGNOSIS — E78.5 HYPERLIPEMIA: ICD-10-CM

## 2021-05-06 DIAGNOSIS — J84.9 ILD (INTERSTITIAL LUNG DISEASE): Primary | ICD-10-CM

## 2021-05-06 DIAGNOSIS — E03.9 ACQUIRED HYPOTHYROIDISM: ICD-10-CM

## 2021-05-06 DIAGNOSIS — R09.02 EXERCISE HYPOXEMIA: Primary | ICD-10-CM

## 2021-05-06 DIAGNOSIS — E55.9 VITAMIN D DEFICIENCY DISEASE: ICD-10-CM

## 2021-05-06 DIAGNOSIS — E78.2 MIXED HYPERLIPIDEMIA: ICD-10-CM

## 2021-05-06 DIAGNOSIS — R06.09 DYSPNEA ON EXERTION: ICD-10-CM

## 2021-05-06 DIAGNOSIS — E88.819 INSULIN RESISTANCE: ICD-10-CM

## 2021-05-06 DIAGNOSIS — E03.9 MYXEDEMA HEART DISEASE: ICD-10-CM

## 2021-05-06 DIAGNOSIS — R05.9 COUGH: ICD-10-CM

## 2021-05-06 DIAGNOSIS — Z29.89 NEED FOR PNEUMOCYSTIS PROPHYLAXIS: ICD-10-CM

## 2021-05-06 PROBLEM — J44.9 MIXED RESTRICTIVE AND OBSTRUCTIVE LUNG DISEASE: Status: ACTIVE | Noted: 2019-08-08

## 2021-05-06 LAB
25(OH)D3+25(OH)D2 SERPL-MCNC: 30 NG/ML (ref 30–96)
ALBUMIN SERPL BCP-MCNC: 3.1 G/DL (ref 3.5–5.2)
ALBUMIN SERPL BCP-MCNC: 3.2 G/DL (ref 3.5–5.2)
ALP SERPL-CCNC: 59 U/L (ref 55–135)
ALP SERPL-CCNC: 63 U/L (ref 55–135)
ALT SERPL W/O P-5'-P-CCNC: 17 U/L (ref 10–44)
ALT SERPL W/O P-5'-P-CCNC: 20 U/L (ref 10–44)
ANION GAP SERPL CALC-SCNC: 10 MMOL/L (ref 8–16)
ANION GAP SERPL CALC-SCNC: 7 MMOL/L (ref 8–16)
AST SERPL-CCNC: 19 U/L (ref 10–40)
AST SERPL-CCNC: 20 U/L (ref 10–40)
BACTERIA #/AREA URNS HPF: NORMAL /HPF
BASOPHILS # BLD AUTO: 0.02 K/UL (ref 0–0.2)
BASOPHILS # BLD AUTO: 0.02 K/UL (ref 0–0.2)
BASOPHILS NFR BLD: 0.2 % (ref 0–1.9)
BASOPHILS NFR BLD: 0.2 % (ref 0–1.9)
BILIRUB SERPL-MCNC: 0.1 MG/DL (ref 0.1–1)
BILIRUB SERPL-MCNC: 0.2 MG/DL (ref 0.1–1)
BILIRUB UR QL STRIP: NEGATIVE
BUN SERPL-MCNC: 12 MG/DL (ref 6–20)
BUN SERPL-MCNC: 12 MG/DL (ref 6–20)
C3 SERPL-MCNC: 138 MG/DL (ref 50–180)
C4 SERPL-MCNC: 33 MG/DL (ref 11–44)
CALCIUM SERPL-MCNC: 8.8 MG/DL (ref 8.7–10.5)
CALCIUM SERPL-MCNC: 8.9 MG/DL (ref 8.7–10.5)
CHLORIDE SERPL-SCNC: 106 MMOL/L (ref 95–110)
CHLORIDE SERPL-SCNC: 106 MMOL/L (ref 95–110)
CHOLEST SERPL-MCNC: 278 MG/DL (ref 120–199)
CHOLEST/HDLC SERPL: 5 {RATIO} (ref 2–5)
CLARITY UR: CLEAR
CO2 SERPL-SCNC: 27 MMOL/L (ref 23–29)
CO2 SERPL-SCNC: 29 MMOL/L (ref 23–29)
COLOR UR: YELLOW
CREAT SERPL-MCNC: 0.9 MG/DL (ref 0.5–1.4)
CREAT SERPL-MCNC: 0.9 MG/DL (ref 0.5–1.4)
CREAT UR-MCNC: 297 MG/DL (ref 15–325)
CRP SERPL-MCNC: 5.5 MG/L (ref 0–8.2)
D DIMER PPP IA.FEU-MCNC: 0.97 MG/L FEU
DIFFERENTIAL METHOD: ABNORMAL
DIFFERENTIAL METHOD: ABNORMAL
EOSINOPHIL # BLD AUTO: 0 K/UL (ref 0–0.5)
EOSINOPHIL # BLD AUTO: 0 K/UL (ref 0–0.5)
EOSINOPHIL NFR BLD: 0.3 % (ref 0–8)
EOSINOPHIL NFR BLD: 0.3 % (ref 0–8)
ERYTHROCYTE [DISTWIDTH] IN BLOOD BY AUTOMATED COUNT: 14.7 % (ref 11.5–14.5)
ERYTHROCYTE [DISTWIDTH] IN BLOOD BY AUTOMATED COUNT: 15.3 % (ref 11.5–14.5)
ERYTHROCYTE [SEDIMENTATION RATE] IN BLOOD BY WESTERGREN METHOD: 65 MM/HR (ref 0–20)
EST. GFR  (AFRICAN AMERICAN): >60 ML/MIN/1.73 M^2
EST. GFR  (AFRICAN AMERICAN): >60 ML/MIN/1.73 M^2
EST. GFR  (NON AFRICAN AMERICAN): >60 ML/MIN/1.73 M^2
EST. GFR  (NON AFRICAN AMERICAN): >60 ML/MIN/1.73 M^2
GLUCOSE SERPL-MCNC: 101 MG/DL (ref 70–110)
GLUCOSE SERPL-MCNC: 98 MG/DL (ref 70–110)
GLUCOSE UR QL STRIP: NEGATIVE
HCT VFR BLD AUTO: 32.5 % (ref 37–48.5)
HCT VFR BLD AUTO: 32.6 % (ref 37–48.5)
HDLC SERPL-MCNC: 56 MG/DL (ref 40–75)
HDLC SERPL: 20.1 % (ref 20–50)
HGB BLD-MCNC: 10.1 G/DL (ref 12–16)
HGB BLD-MCNC: 10.4 G/DL (ref 12–16)
HGB UR QL STRIP: ABNORMAL
HYALINE CASTS #/AREA URNS LPF: 0 /LPF
IGE SERPL-ACNC: <35 IU/ML (ref 0–100)
IMM GRANULOCYTES # BLD AUTO: 0.04 K/UL (ref 0–0.04)
IMM GRANULOCYTES # BLD AUTO: 0.04 K/UL (ref 0–0.04)
IMM GRANULOCYTES NFR BLD AUTO: 0.4 % (ref 0–0.5)
IMM GRANULOCYTES NFR BLD AUTO: 0.4 % (ref 0–0.5)
KETONES UR QL STRIP: ABNORMAL
LDLC SERPL CALC-MCNC: 208.6 MG/DL (ref 63–159)
LEUKOCYTE ESTERASE UR QL STRIP: NEGATIVE
LYMPHOCYTES # BLD AUTO: 1.2 K/UL (ref 1–4.8)
LYMPHOCYTES # BLD AUTO: 1.2 K/UL (ref 1–4.8)
LYMPHOCYTES NFR BLD: 12.4 % (ref 18–48)
LYMPHOCYTES NFR BLD: 12.6 % (ref 18–48)
MCH RBC QN AUTO: 27 PG (ref 27–31)
MCH RBC QN AUTO: 27.7 PG (ref 27–31)
MCHC RBC AUTO-ENTMCNC: 31.1 G/DL (ref 32–36)
MCHC RBC AUTO-ENTMCNC: 31.9 G/DL (ref 32–36)
MCV RBC AUTO: 87 FL (ref 82–98)
MCV RBC AUTO: 87 FL (ref 82–98)
MICROSCOPIC COMMENT: NORMAL
MONOCYTES # BLD AUTO: 0.3 K/UL (ref 0.3–1)
MONOCYTES # BLD AUTO: 0.3 K/UL (ref 0.3–1)
MONOCYTES NFR BLD: 3.2 % (ref 4–15)
MONOCYTES NFR BLD: 3.4 % (ref 4–15)
NEUTROPHILS # BLD AUTO: 8 K/UL (ref 1.8–7.7)
NEUTROPHILS # BLD AUTO: 8.1 K/UL (ref 1.8–7.7)
NEUTROPHILS NFR BLD: 83.1 % (ref 38–73)
NEUTROPHILS NFR BLD: 83.5 % (ref 38–73)
NITRITE UR QL STRIP: NEGATIVE
NONHDLC SERPL-MCNC: 222 MG/DL
NRBC BLD-RTO: 0 /100 WBC
NRBC BLD-RTO: 0 /100 WBC
PH UR STRIP: 6 [PH] (ref 5–8)
PLATELET # BLD AUTO: 310 K/UL (ref 150–450)
PLATELET # BLD AUTO: 329 K/UL (ref 150–450)
PMV BLD AUTO: 11.2 FL (ref 9.2–12.9)
PMV BLD AUTO: 9.8 FL (ref 9.2–12.9)
POTASSIUM SERPL-SCNC: 3.2 MMOL/L (ref 3.5–5.1)
POTASSIUM SERPL-SCNC: 3.2 MMOL/L (ref 3.5–5.1)
PROT SERPL-MCNC: 6.5 G/DL (ref 6–8.4)
PROT SERPL-MCNC: 6.6 G/DL (ref 6–8.4)
PROT UR QL STRIP: ABNORMAL
PROT UR-MCNC: 23 MG/DL (ref 0–15)
PROT/CREAT UR: 0.08 MG/G{CREAT} (ref 0–0.2)
RBC # BLD AUTO: 3.74 M/UL (ref 4–5.4)
RBC # BLD AUTO: 3.75 M/UL (ref 4–5.4)
RBC #/AREA URNS HPF: 3 /HPF (ref 0–4)
SODIUM SERPL-SCNC: 142 MMOL/L (ref 136–145)
SODIUM SERPL-SCNC: 143 MMOL/L (ref 136–145)
SP GR UR STRIP: >=1.03 (ref 1–1.03)
SQUAMOUS #/AREA URNS HPF: 5 /HPF
T3FREE SERPL-MCNC: 1.6 PG/ML (ref 2.3–4.2)
T4 FREE SERPL-MCNC: 0.98 NG/DL (ref 0.71–1.51)
TRIGL SERPL-MCNC: 67 MG/DL (ref 30–150)
TSH SERPL DL<=0.005 MIU/L-ACNC: 0.57 UIU/ML (ref 0.4–4)
URN SPEC COLLECT METH UR: ABNORMAL
WBC # BLD AUTO: 9.58 K/UL (ref 3.9–12.7)
WBC # BLD AUTO: 9.67 K/UL (ref 3.9–12.7)
WBC #/AREA URNS HPF: 2 /HPF (ref 0–5)

## 2021-05-06 PROCEDURE — 84439 ASSAY OF FREE THYROXINE: CPT

## 2021-05-06 PROCEDURE — 71046 X-RAY EXAM CHEST 2 VIEWS: CPT | Mod: TC

## 2021-05-06 PROCEDURE — 86160 COMPLEMENT ANTIGEN: CPT | Performed by: INTERNAL MEDICINE

## 2021-05-06 PROCEDURE — 80053 COMPREHEN METABOLIC PANEL: CPT

## 2021-05-06 PROCEDURE — 84481 FREE ASSAY (FT-3): CPT

## 2021-05-06 PROCEDURE — 85651 RBC SED RATE NONAUTOMATED: CPT | Performed by: INTERNAL MEDICINE

## 2021-05-06 PROCEDURE — 84443 ASSAY THYROID STIM HORMONE: CPT

## 2021-05-06 PROCEDURE — 71046 XR CHEST PA AND LATERAL: ICD-10-PCS | Mod: 26,,, | Performed by: RADIOLOGY

## 2021-05-06 PROCEDURE — 86225 DNA ANTIBODY NATIVE: CPT | Performed by: INTERNAL MEDICINE

## 2021-05-06 PROCEDURE — 82306 VITAMIN D 25 HYDROXY: CPT

## 2021-05-06 PROCEDURE — 3008F PR BODY MASS INDEX (BMI) DOCUMENTED: ICD-10-PCS | Mod: CPTII,S$GLB,, | Performed by: INTERNAL MEDICINE

## 2021-05-06 PROCEDURE — 3008F BODY MASS INDEX DOCD: CPT | Mod: CPTII,S$GLB,, | Performed by: INTERNAL MEDICINE

## 2021-05-06 PROCEDURE — 99999 PR PBB SHADOW E&M-EST. PATIENT-LVL V: ICD-10-PCS | Mod: PBBFAC,,, | Performed by: INTERNAL MEDICINE

## 2021-05-06 PROCEDURE — 85379 FIBRIN DEGRADATION QUANT: CPT | Performed by: INTERNAL MEDICINE

## 2021-05-06 PROCEDURE — 81000 URINALYSIS NONAUTO W/SCOPE: CPT | Performed by: INTERNAL MEDICINE

## 2021-05-06 PROCEDURE — 71046 X-RAY EXAM CHEST 2 VIEWS: CPT | Mod: 26,,, | Performed by: RADIOLOGY

## 2021-05-06 PROCEDURE — 85025 COMPLETE CBC W/AUTO DIFF WBC: CPT

## 2021-05-06 PROCEDURE — 80061 LIPID PANEL: CPT

## 2021-05-06 PROCEDURE — 86160 COMPLEMENT ANTIGEN: CPT | Mod: 59 | Performed by: INTERNAL MEDICINE

## 2021-05-06 PROCEDURE — 85025 COMPLETE CBC W/AUTO DIFF WBC: CPT | Mod: 91 | Performed by: INTERNAL MEDICINE

## 2021-05-06 PROCEDURE — 36415 COLL VENOUS BLD VENIPUNCTURE: CPT

## 2021-05-06 PROCEDURE — 99999 PR PBB SHADOW E&M-EST. PATIENT-LVL V: CPT | Mod: PBBFAC,,, | Performed by: INTERNAL MEDICINE

## 2021-05-06 PROCEDURE — 36415 COLL VENOUS BLD VENIPUNCTURE: CPT | Performed by: INTERNAL MEDICINE

## 2021-05-06 PROCEDURE — 80053 COMPREHEN METABOLIC PANEL: CPT | Mod: 91 | Performed by: INTERNAL MEDICINE

## 2021-05-06 PROCEDURE — 82570 ASSAY OF URINE CREATININE: CPT | Performed by: INTERNAL MEDICINE

## 2021-05-06 PROCEDURE — 82785 ASSAY OF IGE: CPT | Performed by: INTERNAL MEDICINE

## 2021-05-06 PROCEDURE — 86140 C-REACTIVE PROTEIN: CPT | Performed by: INTERNAL MEDICINE

## 2021-05-06 PROCEDURE — 99214 OFFICE O/P EST MOD 30 MIN: CPT | Mod: S$GLB,,, | Performed by: INTERNAL MEDICINE

## 2021-05-06 PROCEDURE — 99214 PR OFFICE/OUTPT VISIT, EST, LEVL IV, 30-39 MIN: ICD-10-PCS | Mod: S$GLB,,, | Performed by: INTERNAL MEDICINE

## 2021-05-06 RX ORDER — VERAPAMIL HYDROCHLORIDE 180 MG/1
180 TABLET, FILM COATED, EXTENDED RELEASE ORAL DAILY
COMMUNITY
Start: 2021-04-12 | End: 2023-05-02

## 2021-05-06 RX ORDER — VILAZODONE HYDROCHLORIDE 10 MG-20MG
KIT ORAL
COMMUNITY
Start: 2020-10-05

## 2021-05-06 RX ORDER — MOMETASONE FUROATE AND FORMOTEROL FUMARATE DIHYDRATE 200; 5 UG/1; UG/1
2 AEROSOL RESPIRATORY (INHALATION) 2 TIMES DAILY
Qty: 1 INHALER | Refills: 11 | Status: SHIPPED | OUTPATIENT
Start: 2021-05-06 | End: 2021-11-04

## 2021-05-06 RX ORDER — MONTELUKAST SODIUM 10 MG/1
TABLET ORAL
COMMUNITY
Start: 2021-04-27

## 2021-05-06 RX ORDER — FOLIC ACID 1 MG/1
1000 TABLET ORAL DAILY
COMMUNITY
Start: 2021-04-20

## 2021-05-06 RX ORDER — BUMETANIDE 2 MG/1
2 TABLET ORAL 2 TIMES DAILY
COMMUNITY
Start: 2021-03-17 | End: 2023-07-17

## 2021-05-06 RX ORDER — ERGOCALCIFEROL 1.25 MG/1
50000 CAPSULE ORAL
COMMUNITY
Start: 2021-04-20 | End: 2023-07-17

## 2021-05-07 LAB — DSDNA AB SER-ACNC: NORMAL [IU]/ML

## 2021-05-10 ENCOUNTER — TELEPHONE (OUTPATIENT)
Dept: RHEUMATOLOGY | Facility: CLINIC | Age: 41
End: 2021-05-10

## 2021-05-11 ENCOUNTER — TELEPHONE (OUTPATIENT)
Dept: RHEUMATOLOGY | Facility: CLINIC | Age: 41
End: 2021-05-11

## 2021-05-11 ENCOUNTER — OFFICE VISIT (OUTPATIENT)
Dept: RHEUMATOLOGY | Facility: CLINIC | Age: 41
End: 2021-05-11
Payer: COMMERCIAL

## 2021-05-11 DIAGNOSIS — Z51.81 ENCOUNTER FOR MEDICATION MONITORING: ICD-10-CM

## 2021-05-11 DIAGNOSIS — M32.13 OTHER SYSTEMIC LUPUS ERYTHEMATOSUS WITH LUNG INVOLVEMENT: Primary | ICD-10-CM

## 2021-05-11 DIAGNOSIS — J84.9 ILD (INTERSTITIAL LUNG DISEASE): ICD-10-CM

## 2021-05-11 DIAGNOSIS — R76.8 ANTI-RNP ANTIBODIES PRESENT: ICD-10-CM

## 2021-05-11 PROCEDURE — 99215 OFFICE O/P EST HI 40 MIN: CPT | Mod: 95,,, | Performed by: INTERNAL MEDICINE

## 2021-05-11 PROCEDURE — 99215 PR OFFICE/OUTPT VISIT, EST, LEVL V, 40-54 MIN: ICD-10-PCS | Mod: 95,,, | Performed by: INTERNAL MEDICINE

## 2021-05-12 ENCOUNTER — PATIENT MESSAGE (OUTPATIENT)
Dept: RHEUMATOLOGY | Facility: CLINIC | Age: 41
End: 2021-05-12

## 2021-05-13 ENCOUNTER — PATIENT MESSAGE (OUTPATIENT)
Dept: PULMONOLOGY | Facility: CLINIC | Age: 41
End: 2021-05-13

## 2021-05-13 ENCOUNTER — PATIENT OUTREACH (OUTPATIENT)
Dept: PULMONOLOGY | Facility: CLINIC | Age: 41
End: 2021-05-13

## 2021-05-18 ENCOUNTER — TELEPHONE (OUTPATIENT)
Dept: RADIOLOGY | Facility: HOSPITAL | Age: 41
End: 2021-05-18

## 2021-05-18 ENCOUNTER — TELEPHONE (OUTPATIENT)
Dept: PULMONOLOGY | Facility: CLINIC | Age: 41
End: 2021-05-18

## 2021-05-19 ENCOUNTER — CLINICAL SUPPORT (OUTPATIENT)
Dept: PULMONOLOGY | Facility: CLINIC | Age: 41
End: 2021-05-19
Payer: COMMERCIAL

## 2021-05-19 ENCOUNTER — HOSPITAL ENCOUNTER (OUTPATIENT)
Dept: RADIOLOGY | Facility: HOSPITAL | Age: 41
Discharge: HOME | End: 2021-05-19
Attending: INTERNAL MEDICINE
Payer: COMMERCIAL

## 2021-05-19 VITALS
OXYGEN SATURATION: 97 % | BODY MASS INDEX: 41.87 KG/M2 | WEIGHT: 251.31 LBS | HEART RATE: 99 BPM | HEIGHT: 65 IN | RESPIRATION RATE: 18 BRPM

## 2021-05-19 DIAGNOSIS — R07.9 ACUTE CHEST PAIN: ICD-10-CM

## 2021-05-19 DIAGNOSIS — J98.4 MIXED RESTRICTIVE AND OBSTRUCTIVE LUNG DISEASE: ICD-10-CM

## 2021-05-19 DIAGNOSIS — J84.9 ILD (INTERSTITIAL LUNG DISEASE): ICD-10-CM

## 2021-05-19 DIAGNOSIS — J43.9 MIXED RESTRICTIVE AND OBSTRUCTIVE LUNG DISEASE: ICD-10-CM

## 2021-05-19 PROCEDURE — 71275 CT ANGIOGRAPHY CHEST: CPT | Mod: TC

## 2021-05-19 PROCEDURE — 25500020 PHARM REV CODE 255: Performed by: INTERNAL MEDICINE

## 2021-05-19 PROCEDURE — 99999 PR PBB SHADOW E&M-EST. PATIENT-LVL III: CPT | Mod: PBBFAC,,,

## 2021-05-19 PROCEDURE — 71275 CT ANGIOGRAPHY CHEST: CPT | Mod: 26,,, | Performed by: RADIOLOGY

## 2021-05-19 PROCEDURE — 99999 PR PBB SHADOW E&M-EST. PATIENT-LVL III: ICD-10-PCS | Mod: PBBFAC,,,

## 2021-05-19 PROCEDURE — 71275 CTA CHEST NON CORONARY: ICD-10-PCS | Mod: 26,,, | Performed by: RADIOLOGY

## 2021-05-19 RX ADMIN — IOHEXOL 100 ML: 350 INJECTION, SOLUTION INTRAVENOUS at 02:05

## 2021-05-25 ENCOUNTER — PATIENT MESSAGE (OUTPATIENT)
Dept: PULMONOLOGY | Facility: CLINIC | Age: 41
End: 2021-05-25

## 2021-05-26 ENCOUNTER — OFFICE VISIT (OUTPATIENT)
Dept: PULMONOLOGY | Facility: CLINIC | Age: 41
End: 2021-05-26
Payer: COMMERCIAL

## 2021-05-26 VITALS — BODY MASS INDEX: 41.77 KG/M2 | WEIGHT: 251 LBS

## 2021-05-26 DIAGNOSIS — R05.3 CHRONIC COUGH: ICD-10-CM

## 2021-05-26 DIAGNOSIS — E88.819 INSULIN RESISTANCE: ICD-10-CM

## 2021-05-26 DIAGNOSIS — E78.2 MIXED HYPERLIPIDEMIA: ICD-10-CM

## 2021-05-26 DIAGNOSIS — M32.13 OTHER SYSTEMIC LUPUS ERYTHEMATOSUS WITH LUNG INVOLVEMENT: ICD-10-CM

## 2021-05-26 DIAGNOSIS — J84.9 ILD (INTERSTITIAL LUNG DISEASE): Primary | ICD-10-CM

## 2021-05-26 DIAGNOSIS — E03.9 ACQUIRED HYPOTHYROIDISM: ICD-10-CM

## 2021-05-26 DIAGNOSIS — J98.4 MIXED RESTRICTIVE AND OBSTRUCTIVE LUNG DISEASE: ICD-10-CM

## 2021-05-26 DIAGNOSIS — I10 ESSENTIAL HYPERTENSION, BENIGN: ICD-10-CM

## 2021-05-26 DIAGNOSIS — J43.9 MIXED RESTRICTIVE AND OBSTRUCTIVE LUNG DISEASE: ICD-10-CM

## 2021-05-26 PROCEDURE — 3008F BODY MASS INDEX DOCD: CPT | Mod: CPTII,,, | Performed by: INTERNAL MEDICINE

## 2021-05-26 PROCEDURE — 99214 OFFICE O/P EST MOD 30 MIN: CPT | Mod: 95,,, | Performed by: INTERNAL MEDICINE

## 2021-05-26 PROCEDURE — 3008F PR BODY MASS INDEX (BMI) DOCUMENTED: ICD-10-PCS | Mod: CPTII,,, | Performed by: INTERNAL MEDICINE

## 2021-05-26 PROCEDURE — 99214 PR OFFICE/OUTPT VISIT, EST, LEVL IV, 30-39 MIN: ICD-10-PCS | Mod: 95,,, | Performed by: INTERNAL MEDICINE

## 2021-05-27 ENCOUNTER — PATIENT MESSAGE (OUTPATIENT)
Dept: PHARMACY | Facility: CLINIC | Age: 41
End: 2021-05-27

## 2021-05-30 ENCOUNTER — LAB VISIT (OUTPATIENT)
Dept: OTOLARYNGOLOGY | Facility: CLINIC | Age: 41
End: 2021-05-30
Payer: COMMERCIAL

## 2021-05-30 DIAGNOSIS — J84.9 ILD (INTERSTITIAL LUNG DISEASE): ICD-10-CM

## 2021-05-30 PROCEDURE — U0005 INFEC AGEN DETEC AMPLI PROBE: HCPCS | Performed by: INTERNAL MEDICINE

## 2021-05-30 PROCEDURE — U0003 INFECTIOUS AGENT DETECTION BY NUCLEIC ACID (DNA OR RNA); SEVERE ACUTE RESPIRATORY SYNDROME CORONAVIRUS 2 (SARS-COV-2) (CORONAVIRUS DISEASE [COVID-19]), AMPLIFIED PROBE TECHNIQUE, MAKING USE OF HIGH THROUGHPUT TECHNOLOGIES AS DESCRIBED BY CMS-2020-01-R: HCPCS | Performed by: INTERNAL MEDICINE

## 2021-05-31 LAB — SARS-COV-2 RNA RESP QL NAA+PROBE: NOT DETECTED

## 2021-06-01 ENCOUNTER — PATIENT MESSAGE (OUTPATIENT)
Dept: PHARMACY | Facility: CLINIC | Age: 41
End: 2021-06-01

## 2021-06-02 ENCOUNTER — CLINICAL SUPPORT (OUTPATIENT)
Dept: PULMONOLOGY | Facility: CLINIC | Age: 41
End: 2021-06-02
Attending: INTERNAL MEDICINE
Payer: COMMERCIAL

## 2021-06-02 ENCOUNTER — HOSPITAL ENCOUNTER (OUTPATIENT)
Dept: CARDIOLOGY | Facility: HOSPITAL | Age: 41
Discharge: HOME | End: 2021-06-02
Attending: INTERNAL MEDICINE
Payer: COMMERCIAL

## 2021-06-02 VITALS — BODY MASS INDEX: 41.82 KG/M2 | WEIGHT: 251 LBS | HEIGHT: 65 IN

## 2021-06-02 VITALS — WEIGHT: 249.81 LBS | HEIGHT: 65 IN | BODY MASS INDEX: 41.62 KG/M2

## 2021-06-02 DIAGNOSIS — J84.9 ILD (INTERSTITIAL LUNG DISEASE): ICD-10-CM

## 2021-06-02 DIAGNOSIS — J98.4 MIXED RESTRICTIVE AND OBSTRUCTIVE LUNG DISEASE: ICD-10-CM

## 2021-06-02 DIAGNOSIS — J43.9 MIXED RESTRICTIVE AND OBSTRUCTIVE LUNG DISEASE: ICD-10-CM

## 2021-06-02 DIAGNOSIS — M32.13 OTHER SYSTEMIC LUPUS ERYTHEMATOSUS WITH LUNG INVOLVEMENT: ICD-10-CM

## 2021-06-02 LAB
ALLENS TEST: NORMAL
AORTIC ROOT ANNULUS: 3.08 CM
AV INDEX (PROSTH): 0.79
AV MEAN GRADIENT: 4 MMHG
AV PEAK GRADIENT: 6 MMHG
AV VALVE AREA: 2.58 CM2
AV VELOCITY RATIO: 0.77
BSA FOR ECHO PROCEDURE: 2.28 M2
CV ECHO LV RWT: 0.59 CM
DELSYS: NORMAL
DOP CALC AO PEAK VEL: 1.26 M/S
DOP CALC AO VTI: 23.3 CM
DOP CALC LVOT AREA: 3.3 CM2
DOP CALC LVOT DIAMETER: 2.04 CM
DOP CALC LVOT PEAK VEL: 0.97 M/S
DOP CALC LVOT STROKE VOLUME: 60.04 CM3
DOP CALC RVOT PEAK VEL: 0.81 M/S
DOP CALC RVOT VTI: 14.69 CM
DOP CALCLVOT PEAK VEL VTI: 18.38 CM
E/A RATIO: 0.85
E/E' RATIO: 7.4 M/S
ECHO LV POSTERIOR WALL: 1.38 CM (ref 0.6–1.1)
EJECTION FRACTION: 55 %
FRACTIONAL SHORTENING: 29 % (ref 28–44)
HCO3 UR-SCNC: 26.3 MMOL/L (ref 24–28)
INTERVENTRICULAR SEPTUM: 1.31 CM (ref 0.6–1.1)
IVRT: 82.78 MSEC
LA MAJOR: 4.1 CM
LA MINOR: 4.06 CM
LA WIDTH: 3.37 CM
LEFT ATRIUM SIZE: 3.54 CM
LEFT ATRIUM VOLUME INDEX: 19 ML/M2
LEFT ATRIUM VOLUME: 41.37 CM3
LEFT INTERNAL DIMENSION IN SYSTOLE: 3.35 CM (ref 2.1–4)
LEFT VENTRICLE DIASTOLIC VOLUME INDEX: 46.89 ML/M2
LEFT VENTRICLE DIASTOLIC VOLUME: 102.22 ML
LEFT VENTRICLE MASS INDEX: 115 G/M2
LEFT VENTRICLE SYSTOLIC VOLUME INDEX: 21 ML/M2
LEFT VENTRICLE SYSTOLIC VOLUME: 45.69 ML
LEFT VENTRICULAR INTERNAL DIMENSION IN DIASTOLE: 4.7 CM (ref 3.5–6)
LEFT VENTRICULAR MASS: 250 G
LV LATERAL E/E' RATIO: 6.17 M/S
LV SEPTAL E/E' RATIO: 9.25 M/S
MV PEAK A VEL: 0.87 M/S
MV PEAK E VEL: 0.74 M/S
PCO2 BLDA: 40.3 MMHG (ref 35–45)
PH SMN: 7.42 [PH] (ref 7.35–7.45)
PISA TR MAX VEL: 2.8 M/S
PO2 BLDA: 91 MMHG (ref 80–100)
POC BE: 2 MMOL/L
POC SATURATED O2: 97 % (ref 95–100)
PV MEAN GRADIENT: 1 MMHG
PV PEAK VELOCITY: 0.97 CM/S
RA MAJOR: 4.19 CM
RA PRESSURE: 3 MMHG
RA WIDTH: 3.11 CM
RIGHT VENTRICULAR END-DIASTOLIC DIMENSION: 2.63 CM
SAMPLE: NORMAL
SINUS: 2.56 CM
SITE: NORMAL
STJ: 2.53 CM
TDI LATERAL: 0.12 M/S
TDI SEPTAL: 0.08 M/S
TDI: 0.1 M/S
TR MAX PG: 31 MMHG
TRICUSPID ANNULAR PLANE SYSTOLIC EXCURSION: 1.53 CM
TV REST PULMONARY ARTERY PRESSURE: 34 MMHG

## 2021-06-02 PROCEDURE — 36600 WITHDRAWAL OF ARTERIAL BLOOD: CPT | Mod: S$GLB,,, | Performed by: INTERNAL MEDICINE

## 2021-06-02 PROCEDURE — 94729 DIFFUSING CAPACITY: CPT | Mod: S$GLB,,, | Performed by: INTERNAL MEDICINE

## 2021-06-02 PROCEDURE — 93010 ELECTROCARDIOGRAM REPORT: CPT | Mod: ,,, | Performed by: INTERNAL MEDICINE

## 2021-06-02 PROCEDURE — 82803 PR  BLOOD GASES: PH, PO2 & PCO2: ICD-10-PCS | Mod: S$GLB,,, | Performed by: INTERNAL MEDICINE

## 2021-06-02 PROCEDURE — 93005 ELECTROCARDIOGRAM TRACING: CPT

## 2021-06-02 PROCEDURE — 93306 TTE W/DOPPLER COMPLETE: CPT | Mod: 26,,, | Performed by: INTERNAL MEDICINE

## 2021-06-02 PROCEDURE — 93010 EKG 12-LEAD: ICD-10-PCS | Mod: ,,, | Performed by: INTERNAL MEDICINE

## 2021-06-02 PROCEDURE — 94726 PLETHYSMOGRAPHY LUNG VOLUMES: CPT | Mod: S$GLB,,, | Performed by: INTERNAL MEDICINE

## 2021-06-02 PROCEDURE — 99999 PR PBB SHADOW E&M-EST. PATIENT-LVL I: ICD-10-PCS | Mod: PBBFAC,,,

## 2021-06-02 PROCEDURE — 36600 PR WITHDRAWAL OF ARTERIAL BLOOD: ICD-10-PCS | Mod: S$GLB,,, | Performed by: INTERNAL MEDICINE

## 2021-06-02 PROCEDURE — 94618 PULMONARY STRESS TESTING: ICD-10-PCS | Mod: S$GLB,,, | Performed by: INTERNAL MEDICINE

## 2021-06-02 PROCEDURE — 82803 BLOOD GASES ANY COMBINATION: CPT | Mod: S$GLB,,, | Performed by: INTERNAL MEDICINE

## 2021-06-02 PROCEDURE — 94729 PR C02/MEMBANE DIFFUSE CAPACITY: ICD-10-PCS | Mod: S$GLB,,, | Performed by: INTERNAL MEDICINE

## 2021-06-02 PROCEDURE — 94618 PULMONARY STRESS TESTING: CPT | Mod: S$GLB,,, | Performed by: INTERNAL MEDICINE

## 2021-06-02 PROCEDURE — 93306 TTE W/DOPPLER COMPLETE: CPT

## 2021-06-02 PROCEDURE — 99999 PR PBB SHADOW E&M-EST. PATIENT-LVL I: CPT | Mod: PBBFAC,,,

## 2021-06-02 PROCEDURE — 94726 PULM FUNCT TST PLETHYSMOGRAP: ICD-10-PCS | Mod: S$GLB,,, | Performed by: INTERNAL MEDICINE

## 2021-06-02 PROCEDURE — 93306 ECHO (CUPID ONLY): ICD-10-PCS | Mod: 26,,, | Performed by: INTERNAL MEDICINE

## 2021-06-02 PROCEDURE — 94060 PR EVAL OF BRONCHOSPASM: ICD-10-PCS | Mod: S$GLB,,, | Performed by: INTERNAL MEDICINE

## 2021-06-02 PROCEDURE — 94060 EVALUATION OF WHEEZING: CPT | Mod: S$GLB,,, | Performed by: INTERNAL MEDICINE

## 2021-06-03 ENCOUNTER — PATIENT MESSAGE (OUTPATIENT)
Dept: RHEUMATOLOGY | Facility: CLINIC | Age: 41
End: 2021-06-03

## 2021-06-03 PROBLEM — R09.02 EXERCISE HYPOXEMIA: Status: ACTIVE | Noted: 2021-06-03

## 2021-06-03 LAB
BRPFT: ABNORMAL
DLCO ADJ PRE: 11.87 ML/(MIN*MMHG) (ref 20.53–32)
DLCO SINGLE BREATH LLN: 20.53
DLCO SINGLE BREATH PRE REF: 40.4 %
DLCO SINGLE BREATH REF: 26.27
DLCOC SBVA LLN: 3.65
DLCOC SBVA PRE REF: 90.4 %
DLCOC SBVA REF: 5.15
DLCOC SINGLE BREATH LLN: 20.53
DLCOC SINGLE BREATH PRE REF: 45.2 %
DLCOC SINGLE BREATH REF: 26.27
DLCOVA LLN: 3.65
DLCOVA PRE REF: 80.8 %
DLCOVA PRE: 4.16 ML/(MIN*MMHG*L) (ref 3.65–6.66)
DLCOVA REF: 5.15
DLVAADJ PRE: 4.66 ML/(MIN*MMHG*L) (ref 3.65–6.66)
ERV LLN: -16448.89
ERV PRE REF: 27 %
ERV REF: 1.11
FEF 25 75 CHG: 30.1 %
FEF 25 75 LLN: 1.54
FEF 25 75 POST REF: 112.2 %
FEF 25 75 PRE REF: 86.3 %
FEF 25 75 REF: 2.86
FET100 CHG: 1.8 %
FEV1 CHG: 5.6 %
FEV1 FVC CHG: 15.4 %
FEV1 FVC LLN: 72
FEV1 FVC POST REF: 116 %
FEV1 FVC PRE REF: 100.5 %
FEV1 FVC REF: 83
FEV1 LLN: 2.08
FEV1 POST REF: 68.9 %
FEV1 PRE REF: 65.2 %
FEV1 REF: 2.68
FRCPLETH LLN: 1.91
FRCPLETH PREREF: 66.7 %
FRCPLETH REF: 2.74
FVC CHG: -8.5 %
FVC LLN: 2.54
FVC POST REF: 59.2 %
FVC PRE REF: 64.7 %
FVC REF: 3.26
IVC PRE: 1.71 L (ref 2.54–3.97)
IVC SINGLE BREATH LLN: 2.54
IVC SINGLE BREATH PRE REF: 52.5 %
IVC SINGLE BREATH REF: 3.26
MVV LLN: 99
MVV PRE REF: 60.8 %
MVV REF: 117
PEF CHG: -10.4 %
PEF LLN: 4.7
PEF POST REF: 90.8 %
PEF PRE REF: 101.4 %
PEF REF: 6.76
POST FEF 25 75: 3.21 L/S (ref 1.54–4.19)
POST FET 100: 6.94 SEC
POST FEV1 FVC: 95.85 % (ref 71.95–93.36)
POST FEV1: 1.85 L (ref 2.08–3.29)
POST FVC: 1.93 L (ref 2.54–3.97)
POST PEF: 6.14 L/S (ref 4.7–8.83)
PRE DLCO: 10.61 ML/(MIN*MMHG) (ref 20.53–32)
PRE ERV: 0.3 L (ref -16448.89–16451.11)
PRE FEF 25 75: 2.47 L/S (ref 1.54–4.19)
PRE FET 100: 6.82 SEC
PRE FEV1 FVC: 83.06 % (ref 71.95–93.36)
PRE FEV1: 1.75 L (ref 2.08–3.29)
PRE FRC PL: 1.83 L
PRE FVC: 2.11 L (ref 2.54–3.97)
PRE MVV: 71 L/MIN (ref 99.19–134.21)
PRE PEF: 6.86 L/S (ref 4.7–8.83)
PRE RV: 1.04 L (ref 1.05–2.2)
PRE TLC: 3.33 L (ref 4.11–6.09)
RAW LLN: 3.06
RAW PRE REF: 73.5 %
RAW PRE: 2.25 CMH2O*S/L (ref 3.06–3.06)
RAW REF: 3.06
RV LLN: 1.05
RV PRE REF: 63.7 %
RV REF: 1.63
RVTLC LLN: 23
RVTLC PRE REF: 95.7 %
RVTLC PRE: 31.15 % (ref 22.97–42.15)
RVTLC REF: 33
TLC LLN: 4.11
TLC PRE REF: 65.2 %
TLC REF: 5.1
VA PRE: 2.55 L (ref 4.95–4.95)
VA SINGLE BREATH LLN: 4.95
VA SINGLE BREATH PRE REF: 51.5 %
VA SINGLE BREATH REF: 4.95
VC LLN: 2.54
VC PRE REF: 70.3 %
VC PRE: 2.29 L (ref 2.54–3.97)
VC REF: 3.26
VTGRAWPRE: 2.23 L

## 2021-06-03 RX ORDER — MYCOPHENOLATE MOFETIL 500 MG/1
1500 TABLET ORAL 2 TIMES DAILY
Qty: 180 TABLET | Refills: 2 | Status: SHIPPED | OUTPATIENT
Start: 2021-06-03 | End: 2023-05-02

## 2021-06-04 ENCOUNTER — TELEPHONE (OUTPATIENT)
Dept: PULMONOLOGY | Facility: CLINIC | Age: 41
End: 2021-06-04

## 2021-06-04 ENCOUNTER — PATIENT MESSAGE (OUTPATIENT)
Dept: CARDIOLOGY | Facility: CLINIC | Age: 41
End: 2021-06-04

## 2021-06-04 ENCOUNTER — PATIENT MESSAGE (OUTPATIENT)
Dept: PULMONOLOGY | Facility: CLINIC | Age: 41
End: 2021-06-04

## 2021-06-07 ENCOUNTER — OFFICE VISIT (OUTPATIENT)
Dept: CARDIOLOGY | Facility: CLINIC | Age: 41
End: 2021-06-07
Payer: COMMERCIAL

## 2021-06-07 ENCOUNTER — LAB VISIT (OUTPATIENT)
Dept: LAB | Facility: HOSPITAL | Age: 41
End: 2021-06-07
Attending: INTERNAL MEDICINE
Payer: COMMERCIAL

## 2021-06-07 VITALS
RESPIRATION RATE: 16 BRPM | SYSTOLIC BLOOD PRESSURE: 104 MMHG | HEART RATE: 87 BPM | OXYGEN SATURATION: 98 % | WEIGHT: 252.63 LBS | BODY MASS INDEX: 42.09 KG/M2 | HEIGHT: 65 IN | DIASTOLIC BLOOD PRESSURE: 70 MMHG

## 2021-06-07 DIAGNOSIS — R06.09 DYSPNEA ON EXERTION: ICD-10-CM

## 2021-06-07 DIAGNOSIS — R60.0 LOCALIZED EDEMA: ICD-10-CM

## 2021-06-07 DIAGNOSIS — G45.9 TIA (TRANSIENT ISCHEMIC ATTACK): ICD-10-CM

## 2021-06-07 DIAGNOSIS — J84.9 ILD (INTERSTITIAL LUNG DISEASE): ICD-10-CM

## 2021-06-07 DIAGNOSIS — M32.13 OTHER SYSTEMIC LUPUS ERYTHEMATOSUS WITH LUNG INVOLVEMENT: ICD-10-CM

## 2021-06-07 DIAGNOSIS — R06.09 DOE (DYSPNEA ON EXERTION): ICD-10-CM

## 2021-06-07 DIAGNOSIS — R00.2 PALPITATION: ICD-10-CM

## 2021-06-07 DIAGNOSIS — I10 ESSENTIAL HYPERTENSION, BENIGN: ICD-10-CM

## 2021-06-07 DIAGNOSIS — E78.2 MIXED HYPERLIPIDEMIA: ICD-10-CM

## 2021-06-07 DIAGNOSIS — J43.9 MIXED RESTRICTIVE AND OBSTRUCTIVE LUNG DISEASE: ICD-10-CM

## 2021-06-07 DIAGNOSIS — R06.09 DOE (DYSPNEA ON EXERTION): Primary | ICD-10-CM

## 2021-06-07 DIAGNOSIS — G47.30 SLEEP APNEA, UNSPECIFIED TYPE: ICD-10-CM

## 2021-06-07 DIAGNOSIS — J98.4 MIXED RESTRICTIVE AND OBSTRUCTIVE LUNG DISEASE: ICD-10-CM

## 2021-06-07 PROCEDURE — 3078F PR MOST RECENT DIASTOLIC BLOOD PRESSURE < 80 MM HG: ICD-10-PCS | Mod: CPTII,S$GLB,, | Performed by: INTERNAL MEDICINE

## 2021-06-07 PROCEDURE — 99205 OFFICE O/P NEW HI 60 MIN: CPT | Mod: S$GLB,,, | Performed by: INTERNAL MEDICINE

## 2021-06-07 PROCEDURE — 36415 COLL VENOUS BLD VENIPUNCTURE: CPT | Mod: PO | Performed by: INTERNAL MEDICINE

## 2021-06-07 PROCEDURE — 99999 PR PBB SHADOW E&M-EST. PATIENT-LVL V: ICD-10-PCS | Mod: PBBFAC,,, | Performed by: INTERNAL MEDICINE

## 2021-06-07 PROCEDURE — 3074F PR MOST RECENT SYSTOLIC BLOOD PRESSURE < 130 MM HG: ICD-10-PCS | Mod: CPTII,S$GLB,, | Performed by: INTERNAL MEDICINE

## 2021-06-07 PROCEDURE — 3008F BODY MASS INDEX DOCD: CPT | Mod: CPTII,S$GLB,, | Performed by: INTERNAL MEDICINE

## 2021-06-07 PROCEDURE — 99205 PR OFFICE/OUTPT VISIT, NEW, LEVL V, 60-74 MIN: ICD-10-PCS | Mod: S$GLB,,, | Performed by: INTERNAL MEDICINE

## 2021-06-07 PROCEDURE — 99999 PR PBB SHADOW E&M-EST. PATIENT-LVL V: CPT | Mod: PBBFAC,,, | Performed by: INTERNAL MEDICINE

## 2021-06-07 PROCEDURE — 3078F DIAST BP <80 MM HG: CPT | Mod: CPTII,S$GLB,, | Performed by: INTERNAL MEDICINE

## 2021-06-07 PROCEDURE — 83735 ASSAY OF MAGNESIUM: CPT | Performed by: INTERNAL MEDICINE

## 2021-06-07 PROCEDURE — 3008F PR BODY MASS INDEX (BMI) DOCUMENTED: ICD-10-PCS | Mod: CPTII,S$GLB,, | Performed by: INTERNAL MEDICINE

## 2021-06-07 PROCEDURE — 3074F SYST BP LT 130 MM HG: CPT | Mod: CPTII,S$GLB,, | Performed by: INTERNAL MEDICINE

## 2021-06-07 PROCEDURE — 83880 ASSAY OF NATRIURETIC PEPTIDE: CPT | Performed by: INTERNAL MEDICINE

## 2021-06-07 RX ORDER — ESZOPICLONE 3 MG/1
3 TABLET, FILM COATED ORAL NIGHTLY
COMMUNITY
End: 2023-05-02

## 2021-06-07 RX ORDER — DEXTROAMPHETAMINE SACCHARATE, AMPHETAMINE ASPARTATE, DEXTROAMPHETAMINE SULFATE AND AMPHETAMINE SULFATE 3.75; 3.75; 3.75; 3.75 MG/1; MG/1; MG/1; MG/1
TABLET ORAL
COMMUNITY
Start: 2021-02-02

## 2021-06-07 RX ORDER — LANOLIN ALCOHOL/MO/W.PET/CERES
400 CREAM (GRAM) TOPICAL DAILY
Qty: 90 TABLET | Refills: 1 | Status: SHIPPED | OUTPATIENT
Start: 2021-06-07 | End: 2024-03-14 | Stop reason: SDUPTHER

## 2021-06-07 RX ORDER — EZETIMIBE 10 MG/1
10 TABLET ORAL DAILY
Qty: 30 TABLET | Refills: 3 | Status: SHIPPED | OUTPATIENT
Start: 2021-06-07 | End: 2023-04-28 | Stop reason: SDUPTHER

## 2021-06-07 RX ORDER — POTASSIUM CHLORIDE 750 MG/1
10 TABLET, EXTENDED RELEASE ORAL DAILY
Qty: 90 TABLET | Refills: 1 | Status: SHIPPED | OUTPATIENT
Start: 2021-06-07 | End: 2021-10-13

## 2021-06-08 LAB
BNP SERPL-MCNC: <10 PG/ML (ref 0–99)
MAGNESIUM SERPL-MCNC: 2 MG/DL (ref 1.6–2.6)

## 2021-06-21 ENCOUNTER — TELEPHONE (OUTPATIENT)
Dept: RHEUMATOLOGY | Facility: CLINIC | Age: 41
End: 2021-06-21

## 2021-06-21 ENCOUNTER — PATIENT MESSAGE (OUTPATIENT)
Dept: RHEUMATOLOGY | Facility: CLINIC | Age: 41
End: 2021-06-21

## 2021-06-22 ENCOUNTER — OFFICE VISIT (OUTPATIENT)
Dept: RHEUMATOLOGY | Facility: CLINIC | Age: 41
End: 2021-06-22
Payer: COMMERCIAL

## 2021-06-22 ENCOUNTER — PATIENT MESSAGE (OUTPATIENT)
Dept: FAMILY MEDICINE | Facility: CLINIC | Age: 41
End: 2021-06-22

## 2021-06-22 ENCOUNTER — PATIENT MESSAGE (OUTPATIENT)
Dept: RHEUMATOLOGY | Facility: CLINIC | Age: 41
End: 2021-06-22

## 2021-06-22 ENCOUNTER — TELEPHONE (OUTPATIENT)
Dept: FAMILY MEDICINE | Facility: CLINIC | Age: 41
End: 2021-06-22

## 2021-06-22 ENCOUNTER — TELEPHONE (OUTPATIENT)
Dept: RHEUMATOLOGY | Facility: CLINIC | Age: 41
End: 2021-06-22

## 2021-06-22 DIAGNOSIS — M06.9 RHEUMATOID ARTHRITIS FLARE: ICD-10-CM

## 2021-06-22 DIAGNOSIS — D84.9 IMMUNOSUPPRESSED STATUS: ICD-10-CM

## 2021-06-22 DIAGNOSIS — J84.112 IDIOPATHIC PULMONARY FIBROSIS: ICD-10-CM

## 2021-06-22 DIAGNOSIS — M32.13 OTHER SYSTEMIC LUPUS ERYTHEMATOSUS WITH LUNG INVOLVEMENT: ICD-10-CM

## 2021-06-22 DIAGNOSIS — J84.9 ILD (INTERSTITIAL LUNG DISEASE): Primary | ICD-10-CM

## 2021-06-22 DIAGNOSIS — Z79.899 HIGH RISK MEDICATION USE: ICD-10-CM

## 2021-06-22 PROCEDURE — 99215 OFFICE O/P EST HI 40 MIN: CPT | Mod: 95,,, | Performed by: INTERNAL MEDICINE

## 2021-06-22 PROCEDURE — 99215 PR OFFICE/OUTPT VISIT, EST, LEVL V, 40-54 MIN: ICD-10-PCS | Mod: 95,,, | Performed by: INTERNAL MEDICINE

## 2021-06-23 ENCOUNTER — PATIENT MESSAGE (OUTPATIENT)
Dept: CARDIOLOGY | Facility: CLINIC | Age: 41
End: 2021-06-23

## 2021-06-23 RX ORDER — ACETAMINOPHEN 325 MG/1
650 TABLET ORAL
Status: CANCELLED | OUTPATIENT
Start: 2021-06-23

## 2021-06-23 RX ORDER — HEPARIN 100 UNIT/ML
500 SYRINGE INTRAVENOUS
Status: CANCELLED | OUTPATIENT
Start: 2021-06-23

## 2021-06-23 RX ORDER — FAMOTIDINE 10 MG/ML
20 INJECTION INTRAVENOUS
Status: CANCELLED | OUTPATIENT
Start: 2021-06-23

## 2021-06-23 RX ORDER — SODIUM CHLORIDE 0.9 % (FLUSH) 0.9 %
10 SYRINGE (ML) INJECTION
Status: CANCELLED | OUTPATIENT
Start: 2021-06-23

## 2021-06-25 ENCOUNTER — PATIENT MESSAGE (OUTPATIENT)
Dept: RHEUMATOLOGY | Facility: CLINIC | Age: 41
End: 2021-06-25

## 2021-06-25 ENCOUNTER — LAB VISIT (OUTPATIENT)
Dept: LAB | Facility: HOSPITAL | Age: 41
End: 2021-06-25
Attending: INTERNAL MEDICINE
Payer: COMMERCIAL

## 2021-06-25 DIAGNOSIS — M06.9 RHEUMATOID ARTHRITIS FLARE: ICD-10-CM

## 2021-06-25 DIAGNOSIS — D84.9 IMMUNOSUPPRESSED STATUS: ICD-10-CM

## 2021-06-25 DIAGNOSIS — Z79.899 HIGH RISK MEDICATION USE: ICD-10-CM

## 2021-06-25 DIAGNOSIS — R60.0 LOCALIZED EDEMA: ICD-10-CM

## 2021-06-25 DIAGNOSIS — R06.09 DOE (DYSPNEA ON EXERTION): ICD-10-CM

## 2021-06-25 LAB
ANION GAP SERPL CALC-SCNC: 12 MMOL/L (ref 8–16)
BUN SERPL-MCNC: 12 MG/DL (ref 6–20)
CALCIUM SERPL-MCNC: 9.2 MG/DL (ref 8.7–10.5)
CHLORIDE SERPL-SCNC: 104 MMOL/L (ref 95–110)
CO2 SERPL-SCNC: 23 MMOL/L (ref 23–29)
CREAT SERPL-MCNC: 0.9 MG/DL (ref 0.5–1.4)
EST. GFR  (AFRICAN AMERICAN): >60 ML/MIN/1.73 M^2
EST. GFR  (NON AFRICAN AMERICAN): >60 ML/MIN/1.73 M^2
GLUCOSE SERPL-MCNC: 84 MG/DL (ref 70–110)
POTASSIUM SERPL-SCNC: 3.6 MMOL/L (ref 3.5–5.1)
SODIUM SERPL-SCNC: 139 MMOL/L (ref 136–145)

## 2021-06-25 PROCEDURE — 80048 BASIC METABOLIC PNL TOTAL CA: CPT | Performed by: INTERNAL MEDICINE

## 2021-06-25 PROCEDURE — 86706 HEP B SURFACE ANTIBODY: CPT | Performed by: INTERNAL MEDICINE

## 2021-06-25 PROCEDURE — 86704 HEP B CORE ANTIBODY TOTAL: CPT | Performed by: INTERNAL MEDICINE

## 2021-06-25 PROCEDURE — 87340 HEPATITIS B SURFACE AG IA: CPT | Performed by: INTERNAL MEDICINE

## 2021-06-25 PROCEDURE — 86803 HEPATITIS C AB TEST: CPT | Performed by: INTERNAL MEDICINE

## 2021-06-25 PROCEDURE — 36415 COLL VENOUS BLD VENIPUNCTURE: CPT | Mod: PO | Performed by: INTERNAL MEDICINE

## 2021-06-28 ENCOUNTER — PATIENT MESSAGE (OUTPATIENT)
Dept: RHEUMATOLOGY | Facility: CLINIC | Age: 41
End: 2021-06-28

## 2021-06-28 LAB
HBV CORE AB SERPL QL IA: NEGATIVE
HBV SURFACE AB SER-ACNC: POSITIVE M[IU]/ML
HBV SURFACE AG SERPL QL IA: NEGATIVE
HCV AB SERPL QL IA: NEGATIVE

## 2021-06-29 ENCOUNTER — PATIENT MESSAGE (OUTPATIENT)
Dept: CARDIOLOGY | Facility: HOSPITAL | Age: 41
End: 2021-06-29

## 2021-07-06 ENCOUNTER — PATIENT MESSAGE (OUTPATIENT)
Dept: RHEUMATOLOGY | Facility: CLINIC | Age: 41
End: 2021-07-06

## 2021-07-06 RX ORDER — THYROID, PORCINE 30 MG/1
30 TABLET ORAL DAILY
COMMUNITY
Start: 2021-06-24 | End: 2023-05-02

## 2021-07-06 RX ORDER — GABAPENTIN 100 MG/1
100-200 CAPSULE ORAL NIGHTLY
COMMUNITY
Start: 2021-06-24 | End: 2023-05-02

## 2021-07-07 DIAGNOSIS — J84.9 ILD (INTERSTITIAL LUNG DISEASE): Primary | ICD-10-CM

## 2021-07-08 ENCOUNTER — SPECIALTY PHARMACY (OUTPATIENT)
Dept: PHARMACY | Facility: CLINIC | Age: 41
End: 2021-07-08

## 2021-07-12 ENCOUNTER — PATIENT MESSAGE (OUTPATIENT)
Dept: RHEUMATOLOGY | Facility: CLINIC | Age: 41
End: 2021-07-12

## 2021-07-12 ENCOUNTER — PATIENT OUTREACH (OUTPATIENT)
Dept: PULMONOLOGY | Facility: CLINIC | Age: 41
End: 2021-07-12

## 2021-07-13 ENCOUNTER — TELEPHONE (OUTPATIENT)
Dept: RHEUMATOLOGY | Facility: CLINIC | Age: 41
End: 2021-07-13

## 2021-07-13 ENCOUNTER — PATIENT MESSAGE (OUTPATIENT)
Dept: RHEUMATOLOGY | Facility: CLINIC | Age: 41
End: 2021-07-13

## 2021-07-20 ENCOUNTER — PATIENT MESSAGE (OUTPATIENT)
Dept: RHEUMATOLOGY | Facility: CLINIC | Age: 41
End: 2021-07-20

## 2021-07-20 ENCOUNTER — INFUSION (OUTPATIENT)
Dept: INFUSION THERAPY | Facility: HOSPITAL | Age: 41
End: 2021-07-20
Attending: INTERNAL MEDICINE
Payer: COMMERCIAL

## 2021-07-20 ENCOUNTER — LAB VISIT (OUTPATIENT)
Dept: LAB | Facility: HOSPITAL | Age: 41
End: 2021-07-20
Payer: COMMERCIAL

## 2021-07-20 VITALS
RESPIRATION RATE: 18 BRPM | DIASTOLIC BLOOD PRESSURE: 82 MMHG | TEMPERATURE: 98 F | SYSTOLIC BLOOD PRESSURE: 123 MMHG | HEART RATE: 82 BPM | BODY MASS INDEX: 43.16 KG/M2 | HEIGHT: 65 IN | OXYGEN SATURATION: 98 % | WEIGHT: 259.06 LBS

## 2021-07-20 DIAGNOSIS — J84.9 ILD (INTERSTITIAL LUNG DISEASE): ICD-10-CM

## 2021-07-20 DIAGNOSIS — M06.9 RHEUMATOID ARTHRITIS FLARE: Primary | ICD-10-CM

## 2021-07-20 DIAGNOSIS — M32.13 OTHER SYSTEMIC LUPUS ERYTHEMATOSUS WITH LUNG INVOLVEMENT: ICD-10-CM

## 2021-07-20 LAB
ALBUMIN SERPL BCP-MCNC: 3.3 G/DL (ref 3.5–5.2)
ALP SERPL-CCNC: 72 U/L (ref 55–135)
ALT SERPL W/O P-5'-P-CCNC: 15 U/L (ref 10–44)
ANION GAP SERPL CALC-SCNC: 14 MMOL/L (ref 8–16)
AST SERPL-CCNC: 21 U/L (ref 10–40)
BASOPHILS # BLD AUTO: 0.05 K/UL (ref 0–0.2)
BASOPHILS NFR BLD: 0.4 % (ref 0–1.9)
BILIRUB SERPL-MCNC: 0.3 MG/DL (ref 0.1–1)
BUN SERPL-MCNC: 11 MG/DL (ref 6–20)
CALCIUM SERPL-MCNC: 8.8 MG/DL (ref 8.7–10.5)
CHLORIDE SERPL-SCNC: 101 MMOL/L (ref 95–110)
CO2 SERPL-SCNC: 28 MMOL/L (ref 23–29)
CREAT SERPL-MCNC: 1 MG/DL (ref 0.5–1.4)
CRP SERPL-MCNC: 16.2 MG/L (ref 0–8.2)
DIFFERENTIAL METHOD: ABNORMAL
EOSINOPHIL # BLD AUTO: 0.5 K/UL (ref 0–0.5)
EOSINOPHIL NFR BLD: 3.6 % (ref 0–8)
ERYTHROCYTE [DISTWIDTH] IN BLOOD BY AUTOMATED COUNT: 14.9 % (ref 11.5–14.5)
ERYTHROCYTE [SEDIMENTATION RATE] IN BLOOD BY WESTERGREN METHOD: 84 MM/HR (ref 0–36)
EST. GFR  (AFRICAN AMERICAN): >60 ML/MIN/1.73 M^2
EST. GFR  (NON AFRICAN AMERICAN): >60 ML/MIN/1.73 M^2
GLUCOSE SERPL-MCNC: 87 MG/DL (ref 70–110)
HCT VFR BLD AUTO: 34.4 % (ref 37–48.5)
HGB BLD-MCNC: 10.6 G/DL (ref 12–16)
IMM GRANULOCYTES # BLD AUTO: 0.06 K/UL (ref 0–0.04)
IMM GRANULOCYTES NFR BLD AUTO: 0.5 % (ref 0–0.5)
LYMPHOCYTES # BLD AUTO: 3.6 K/UL (ref 1–4.8)
LYMPHOCYTES NFR BLD: 28.4 % (ref 18–48)
MCH RBC QN AUTO: 27.1 PG (ref 27–31)
MCHC RBC AUTO-ENTMCNC: 30.8 G/DL (ref 32–36)
MCV RBC AUTO: 88 FL (ref 82–98)
MONOCYTES # BLD AUTO: 1.1 K/UL (ref 0.3–1)
MONOCYTES NFR BLD: 9 % (ref 4–15)
NEUTROPHILS # BLD AUTO: 7.4 K/UL (ref 1.8–7.7)
NEUTROPHILS NFR BLD: 58.1 % (ref 38–73)
NRBC BLD-RTO: 0 /100 WBC
PLATELET # BLD AUTO: 314 K/UL (ref 150–450)
PMV BLD AUTO: 10.5 FL (ref 9.2–12.9)
POTASSIUM SERPL-SCNC: 3.2 MMOL/L (ref 3.5–5.1)
PROT SERPL-MCNC: 7 G/DL (ref 6–8.4)
RBC # BLD AUTO: 3.91 M/UL (ref 4–5.4)
SODIUM SERPL-SCNC: 143 MMOL/L (ref 136–145)
WBC # BLD AUTO: 12.67 K/UL (ref 3.9–12.7)

## 2021-07-20 PROCEDURE — 80053 COMPREHEN METABOLIC PANEL: CPT | Performed by: INTERNAL MEDICINE

## 2021-07-20 PROCEDURE — 86140 C-REACTIVE PROTEIN: CPT | Performed by: INTERNAL MEDICINE

## 2021-07-20 PROCEDURE — 96415 CHEMO IV INFUSION ADDL HR: CPT

## 2021-07-20 PROCEDURE — 96375 TX/PRO/DX INJ NEW DRUG ADDON: CPT

## 2021-07-20 PROCEDURE — 96413 CHEMO IV INFUSION 1 HR: CPT

## 2021-07-20 PROCEDURE — A4216 STERILE WATER/SALINE, 10 ML: HCPCS | Performed by: INTERNAL MEDICINE

## 2021-07-20 PROCEDURE — 63600175 PHARM REV CODE 636 W HCPCS: Performed by: INTERNAL MEDICINE

## 2021-07-20 PROCEDURE — 85025 COMPLETE CBC W/AUTO DIFF WBC: CPT | Performed by: INTERNAL MEDICINE

## 2021-07-20 PROCEDURE — 85652 RBC SED RATE AUTOMATED: CPT | Performed by: INTERNAL MEDICINE

## 2021-07-20 PROCEDURE — 25000003 PHARM REV CODE 250: Performed by: INTERNAL MEDICINE

## 2021-07-20 PROCEDURE — 36415 COLL VENOUS BLD VENIPUNCTURE: CPT | Performed by: INTERNAL MEDICINE

## 2021-07-20 RX ORDER — HEPARIN 100 UNIT/ML
500 SYRINGE INTRAVENOUS
Status: CANCELLED | OUTPATIENT
Start: 2021-08-03

## 2021-07-20 RX ORDER — METHYLPREDNISOLONE SOD SUCC 125 MG
100 VIAL (EA) INJECTION
Status: CANCELLED
Start: 2021-08-03

## 2021-07-20 RX ORDER — ACETAMINOPHEN 325 MG/1
650 TABLET ORAL
Status: CANCELLED | OUTPATIENT
Start: 2021-08-03

## 2021-07-20 RX ORDER — ACETAMINOPHEN 325 MG/1
650 TABLET ORAL
Status: COMPLETED | OUTPATIENT
Start: 2021-07-20 | End: 2021-07-20

## 2021-07-20 RX ORDER — FAMOTIDINE 10 MG/ML
20 INJECTION INTRAVENOUS
Status: COMPLETED | OUTPATIENT
Start: 2021-07-20 | End: 2021-07-20

## 2021-07-20 RX ORDER — DIPHENHYDRAMINE HYDROCHLORIDE 50 MG/ML
25 INJECTION INTRAMUSCULAR; INTRAVENOUS
Status: COMPLETED | OUTPATIENT
Start: 2021-07-20 | End: 2021-07-20

## 2021-07-20 RX ORDER — METHYLPREDNISOLONE SOD SUCC 125 MG
100 VIAL (EA) INJECTION
Status: COMPLETED | OUTPATIENT
Start: 2021-07-20 | End: 2021-07-20

## 2021-07-20 RX ORDER — DIPHENHYDRAMINE HYDROCHLORIDE 50 MG/ML
25 INJECTION INTRAMUSCULAR; INTRAVENOUS
Status: CANCELLED
Start: 2021-08-03

## 2021-07-20 RX ORDER — SODIUM CHLORIDE 0.9 % (FLUSH) 0.9 %
10 SYRINGE (ML) INJECTION
Status: DISCONTINUED | OUTPATIENT
Start: 2021-07-20 | End: 2021-07-20 | Stop reason: HOSPADM

## 2021-07-20 RX ORDER — FAMOTIDINE 10 MG/ML
20 INJECTION INTRAVENOUS
Status: CANCELLED | OUTPATIENT
Start: 2021-08-03

## 2021-07-20 RX ORDER — SODIUM CHLORIDE 0.9 % (FLUSH) 0.9 %
10 SYRINGE (ML) INJECTION
Status: CANCELLED | OUTPATIENT
Start: 2021-08-03

## 2021-07-20 RX ADMIN — RITUXIMAB 1000 MG: 10 INJECTION, SOLUTION INTRAVENOUS at 09:07

## 2021-07-20 RX ADMIN — DIPHENHYDRAMINE HYDROCHLORIDE 25 MG: 50 INJECTION INTRAMUSCULAR; INTRAVENOUS at 09:07

## 2021-07-20 RX ADMIN — METHYLPREDNISOLONE SODIUM SUCCINATE 100 MG: 125 INJECTION, POWDER, FOR SOLUTION INTRAMUSCULAR; INTRAVENOUS at 09:07

## 2021-07-20 RX ADMIN — FAMOTIDINE 20 MG: 10 INJECTION INTRAVENOUS at 09:07

## 2021-07-20 RX ADMIN — ACETAMINOPHEN 650 MG: 325 TABLET ORAL at 09:07

## 2021-07-20 RX ADMIN — Medication 10 ML: at 09:07

## 2021-08-02 ENCOUNTER — TELEPHONE (OUTPATIENT)
Dept: RHEUMATOLOGY | Facility: CLINIC | Age: 41
End: 2021-08-02

## 2021-08-03 ENCOUNTER — PATIENT MESSAGE (OUTPATIENT)
Dept: RHEUMATOLOGY | Facility: CLINIC | Age: 41
End: 2021-08-03

## 2021-08-03 ENCOUNTER — INFUSION (OUTPATIENT)
Dept: INFUSION THERAPY | Facility: HOSPITAL | Age: 41
End: 2021-08-03
Attending: INTERNAL MEDICINE
Payer: COMMERCIAL

## 2021-08-03 ENCOUNTER — PATIENT MESSAGE (OUTPATIENT)
Dept: CARDIOLOGY | Facility: CLINIC | Age: 41
End: 2021-08-03

## 2021-08-03 VITALS
HEART RATE: 97 BPM | WEIGHT: 251 LBS | TEMPERATURE: 98 F | OXYGEN SATURATION: 99 % | BODY MASS INDEX: 41.77 KG/M2 | RESPIRATION RATE: 18 BRPM | SYSTOLIC BLOOD PRESSURE: 144 MMHG | DIASTOLIC BLOOD PRESSURE: 79 MMHG

## 2021-08-03 DIAGNOSIS — M06.9 RHEUMATOID ARTHRITIS FLARE: Primary | ICD-10-CM

## 2021-08-03 PROCEDURE — 96413 CHEMO IV INFUSION 1 HR: CPT

## 2021-08-03 PROCEDURE — 25000003 PHARM REV CODE 250: Performed by: INTERNAL MEDICINE

## 2021-08-03 PROCEDURE — A4216 STERILE WATER/SALINE, 10 ML: HCPCS | Performed by: INTERNAL MEDICINE

## 2021-08-03 PROCEDURE — 63600175 PHARM REV CODE 636 W HCPCS: Mod: JG | Performed by: INTERNAL MEDICINE

## 2021-08-03 PROCEDURE — 96415 CHEMO IV INFUSION ADDL HR: CPT

## 2021-08-03 PROCEDURE — 96375 TX/PRO/DX INJ NEW DRUG ADDON: CPT

## 2021-08-03 RX ORDER — SODIUM CHLORIDE 0.9 % (FLUSH) 0.9 %
10 SYRINGE (ML) INJECTION
Status: DISCONTINUED | OUTPATIENT
Start: 2021-08-03 | End: 2021-08-03 | Stop reason: HOSPADM

## 2021-08-03 RX ORDER — HEPARIN 100 UNIT/ML
500 SYRINGE INTRAVENOUS
Status: CANCELLED | OUTPATIENT
Start: 2021-08-17

## 2021-08-03 RX ORDER — ACETAMINOPHEN 325 MG/1
650 TABLET ORAL
Status: COMPLETED | OUTPATIENT
Start: 2021-08-03 | End: 2021-08-03

## 2021-08-03 RX ORDER — SODIUM CHLORIDE 0.9 % (FLUSH) 0.9 %
10 SYRINGE (ML) INJECTION
Status: CANCELLED | OUTPATIENT
Start: 2021-08-17

## 2021-08-03 RX ORDER — FAMOTIDINE 10 MG/ML
20 INJECTION INTRAVENOUS
Status: CANCELLED | OUTPATIENT
Start: 2021-08-17

## 2021-08-03 RX ORDER — ACETAMINOPHEN 325 MG/1
650 TABLET ORAL
Status: CANCELLED | OUTPATIENT
Start: 2021-08-17

## 2021-08-03 RX ORDER — METHYLPREDNISOLONE SOD SUCC 125 MG
100 VIAL (EA) INJECTION
Status: CANCELLED
Start: 2021-08-17

## 2021-08-03 RX ORDER — DIPHENHYDRAMINE HYDROCHLORIDE 50 MG/ML
25 INJECTION INTRAMUSCULAR; INTRAVENOUS
Status: CANCELLED
Start: 2021-08-17

## 2021-08-03 RX ORDER — FAMOTIDINE 10 MG/ML
20 INJECTION INTRAVENOUS
Status: COMPLETED | OUTPATIENT
Start: 2021-08-03 | End: 2021-08-03

## 2021-08-03 RX ORDER — DIPHENHYDRAMINE HYDROCHLORIDE 50 MG/ML
25 INJECTION INTRAMUSCULAR; INTRAVENOUS
Status: COMPLETED | OUTPATIENT
Start: 2021-08-03 | End: 2021-08-03

## 2021-08-03 RX ORDER — METHYLPREDNISOLONE SOD SUCC 125 MG
100 VIAL (EA) INJECTION
Status: COMPLETED | OUTPATIENT
Start: 2021-08-03 | End: 2021-08-03

## 2021-08-03 RX ADMIN — RITUXIMAB 1000 MG: 10 INJECTION, SOLUTION INTRAVENOUS at 10:08

## 2021-08-03 RX ADMIN — FAMOTIDINE 20 MG: 10 INJECTION INTRAVENOUS at 09:08

## 2021-08-03 RX ADMIN — ACETAMINOPHEN 650 MG: 325 TABLET ORAL at 09:08

## 2021-08-03 RX ADMIN — DIPHENHYDRAMINE HYDROCHLORIDE 25 MG: 50 INJECTION INTRAMUSCULAR; INTRAVENOUS at 09:08

## 2021-08-03 RX ADMIN — Medication 10 ML: at 09:08

## 2021-08-03 RX ADMIN — METHYLPREDNISOLONE SODIUM SUCCINATE 100 MG: 125 INJECTION, POWDER, FOR SOLUTION INTRAMUSCULAR; INTRAVENOUS at 09:08

## 2021-08-04 ENCOUNTER — OFFICE VISIT (OUTPATIENT)
Dept: CARDIOLOGY | Facility: CLINIC | Age: 41
End: 2021-08-04
Payer: COMMERCIAL

## 2021-08-04 DIAGNOSIS — R00.2 PALPITATION: ICD-10-CM

## 2021-08-04 DIAGNOSIS — J43.9 MIXED RESTRICTIVE AND OBSTRUCTIVE LUNG DISEASE: ICD-10-CM

## 2021-08-04 DIAGNOSIS — J84.9 ILD (INTERSTITIAL LUNG DISEASE): ICD-10-CM

## 2021-08-04 DIAGNOSIS — I10 ESSENTIAL HYPERTENSION, BENIGN: ICD-10-CM

## 2021-08-04 DIAGNOSIS — R06.09 DOE (DYSPNEA ON EXERTION): Primary | ICD-10-CM

## 2021-08-04 DIAGNOSIS — M32.13 OTHER SYSTEMIC LUPUS ERYTHEMATOSUS WITH LUNG INVOLVEMENT: ICD-10-CM

## 2021-08-04 DIAGNOSIS — R60.0 LOCALIZED EDEMA: ICD-10-CM

## 2021-08-04 DIAGNOSIS — I27.20 PULMONARY HYPERTENSION: ICD-10-CM

## 2021-08-04 DIAGNOSIS — J98.4 MIXED RESTRICTIVE AND OBSTRUCTIVE LUNG DISEASE: ICD-10-CM

## 2021-08-04 DIAGNOSIS — R06.09 DYSPNEA ON EXERTION: ICD-10-CM

## 2021-08-04 DIAGNOSIS — J84.112 IDIOPATHIC PULMONARY FIBROSIS: ICD-10-CM

## 2021-08-04 PROCEDURE — 1159F PR MEDICATION LIST DOCUMENTED IN MEDICAL RECORD: ICD-10-PCS | Mod: CPTII,,, | Performed by: INTERNAL MEDICINE

## 2021-08-04 PROCEDURE — 1160F RVW MEDS BY RX/DR IN RCRD: CPT | Mod: CPTII,,, | Performed by: INTERNAL MEDICINE

## 2021-08-04 PROCEDURE — 99215 PR OFFICE/OUTPT VISIT, EST, LEVL V, 40-54 MIN: ICD-10-PCS | Mod: 95,,, | Performed by: INTERNAL MEDICINE

## 2021-08-04 PROCEDURE — 1159F MED LIST DOCD IN RCRD: CPT | Mod: CPTII,,, | Performed by: INTERNAL MEDICINE

## 2021-08-04 PROCEDURE — 3044F PR MOST RECENT HEMOGLOBIN A1C LEVEL <7.0%: ICD-10-PCS | Mod: CPTII,,, | Performed by: INTERNAL MEDICINE

## 2021-08-04 PROCEDURE — 99215 OFFICE O/P EST HI 40 MIN: CPT | Mod: 95,,, | Performed by: INTERNAL MEDICINE

## 2021-08-04 PROCEDURE — 1160F PR REVIEW ALL MEDS BY PRESCRIBER/CLIN PHARMACIST DOCUMENTED: ICD-10-PCS | Mod: CPTII,,, | Performed by: INTERNAL MEDICINE

## 2021-08-04 PROCEDURE — 3044F HG A1C LEVEL LT 7.0%: CPT | Mod: CPTII,,, | Performed by: INTERNAL MEDICINE

## 2021-09-02 ENCOUNTER — PATIENT MESSAGE (OUTPATIENT)
Dept: CARDIOLOGY | Facility: CLINIC | Age: 41
End: 2021-09-02

## 2021-09-02 ENCOUNTER — PATIENT MESSAGE (OUTPATIENT)
Dept: CARDIOLOGY | Facility: HOSPITAL | Age: 41
End: 2021-09-02

## 2021-10-12 ENCOUNTER — HOSPITAL ENCOUNTER (OUTPATIENT)
Dept: CARDIOLOGY | Facility: HOSPITAL | Age: 41
Discharge: HOME | End: 2021-10-12
Attending: INTERNAL MEDICINE
Payer: COMMERCIAL

## 2021-10-12 ENCOUNTER — HOSPITAL ENCOUNTER (OUTPATIENT)
Dept: RADIOLOGY | Facility: HOSPITAL | Age: 41
Discharge: HOME | End: 2021-10-12
Attending: INTERNAL MEDICINE
Payer: COMMERCIAL

## 2021-10-12 ENCOUNTER — LAB VISIT (OUTPATIENT)
Dept: LAB | Facility: HOSPITAL | Age: 41
End: 2021-10-12
Attending: NURSE PRACTITIONER
Payer: COMMERCIAL

## 2021-10-12 DIAGNOSIS — J43.9 MIXED RESTRICTIVE AND OBSTRUCTIVE LUNG DISEASE: ICD-10-CM

## 2021-10-12 DIAGNOSIS — J84.9 ILD (INTERSTITIAL LUNG DISEASE): ICD-10-CM

## 2021-10-12 DIAGNOSIS — R06.09 DOE (DYSPNEA ON EXERTION): ICD-10-CM

## 2021-10-12 DIAGNOSIS — R60.0 LOCALIZED EDEMA: ICD-10-CM

## 2021-10-12 DIAGNOSIS — E78.5 HYPERLIPEMIA: ICD-10-CM

## 2021-10-12 DIAGNOSIS — R00.2 PALPITATION: ICD-10-CM

## 2021-10-12 DIAGNOSIS — R06.09 DYSPNEA ON EXERTION: ICD-10-CM

## 2021-10-12 DIAGNOSIS — J98.4 MIXED RESTRICTIVE AND OBSTRUCTIVE LUNG DISEASE: ICD-10-CM

## 2021-10-12 DIAGNOSIS — J84.112 IDIOPATHIC PULMONARY FIBROSIS: ICD-10-CM

## 2021-10-12 DIAGNOSIS — E03.9 HYPOTHYROIDISM: ICD-10-CM

## 2021-10-12 LAB
25(OH)D3+25(OH)D2 SERPL-MCNC: 20 NG/ML (ref 30–96)
ALBUMIN SERPL BCP-MCNC: 3.4 G/DL (ref 3.5–5.2)
ALP SERPL-CCNC: 67 U/L (ref 55–135)
ALT SERPL W/O P-5'-P-CCNC: 23 U/L (ref 10–44)
ANION GAP SERPL CALC-SCNC: 14 MMOL/L (ref 8–16)
AST SERPL-CCNC: 27 U/L (ref 10–40)
BASOPHILS # BLD AUTO: 0.05 K/UL (ref 0–0.2)
BASOPHILS NFR BLD: 0.5 % (ref 0–1.9)
BILIRUB SERPL-MCNC: 0.4 MG/DL (ref 0.1–1)
BUN SERPL-MCNC: 11 MG/DL (ref 6–20)
CALCIUM SERPL-MCNC: 9 MG/DL (ref 8.7–10.5)
CHLORIDE SERPL-SCNC: 98 MMOL/L (ref 95–110)
CHOLEST SERPL-MCNC: 238 MG/DL (ref 120–199)
CHOLEST/HDLC SERPL: 3 {RATIO} (ref 2–5)
CO2 SERPL-SCNC: 26 MMOL/L (ref 23–29)
CREAT SERPL-MCNC: 0.9 MG/DL (ref 0.5–1.4)
CV STRESS BASE HR: 98 BPM
DIASTOLIC BLOOD PRESSURE: 85 MMHG
DIFFERENTIAL METHOD: ABNORMAL
EOSINOPHIL # BLD AUTO: 0.2 K/UL (ref 0–0.5)
EOSINOPHIL NFR BLD: 2 % (ref 0–8)
ERYTHROCYTE [DISTWIDTH] IN BLOOD BY AUTOMATED COUNT: 17.5 % (ref 11.5–14.5)
EST. GFR  (AFRICAN AMERICAN): >60 ML/MIN/1.73 M^2
EST. GFR  (NON AFRICAN AMERICAN): >60 ML/MIN/1.73 M^2
GLUCOSE SERPL-MCNC: 86 MG/DL (ref 70–110)
HCT VFR BLD AUTO: 34.2 % (ref 37–48.5)
HDLC SERPL-MCNC: 79 MG/DL (ref 40–75)
HDLC SERPL: 33.2 % (ref 20–50)
HGB BLD-MCNC: 10.3 G/DL (ref 12–16)
IMM GRANULOCYTES # BLD AUTO: 0.04 K/UL (ref 0–0.04)
IMM GRANULOCYTES NFR BLD AUTO: 0.4 % (ref 0–0.5)
LDLC SERPL CALC-MCNC: 132.4 MG/DL (ref 63–159)
LYMPHOCYTES # BLD AUTO: 2.8 K/UL (ref 1–4.8)
LYMPHOCYTES NFR BLD: 27.3 % (ref 18–48)
MCH RBC QN AUTO: 26.1 PG (ref 27–31)
MCHC RBC AUTO-ENTMCNC: 30.1 G/DL (ref 32–36)
MCV RBC AUTO: 87 FL (ref 82–98)
MONOCYTES # BLD AUTO: 0.9 K/UL (ref 0.3–1)
MONOCYTES NFR BLD: 8.8 % (ref 4–15)
NEUTROPHILS # BLD AUTO: 6.2 K/UL (ref 1.8–7.7)
NEUTROPHILS NFR BLD: 61 % (ref 38–73)
NONHDLC SERPL-MCNC: 159 MG/DL
NRBC BLD-RTO: 0 /100 WBC
NUC REST EJECTION FRACTION: 76
NUC STRESS EJECTION FRACTION: 65 %
OHS CV CPX 1 MINUTE RECOVERY HEART RATE: 115 BPM
OHS CV CPX 85 PERCENT MAX PREDICTED HEART RATE MALE: 145
OHS CV CPX ESTIMATED METS: 1
OHS CV CPX MAX PREDICTED HEART RATE: 171
OHS CV CPX PATIENT IS FEMALE: 1
OHS CV CPX PATIENT IS MALE: 0
OHS CV CPX PEAK DIASTOLIC BLOOD PRESSURE: 65 MMHG
OHS CV CPX PEAK HEAR RATE: 118 BPM
OHS CV CPX PEAK RATE PRESSURE PRODUCT: NORMAL
OHS CV CPX PEAK SYSTOLIC BLOOD PRESSURE: 127 MMHG
OHS CV CPX PERCENT MAX PREDICTED HEART RATE ACHIEVED: 69
OHS CV CPX RATE PRESSURE PRODUCT PRESENTING: NORMAL
PLATELET # BLD AUTO: 373 K/UL (ref 150–450)
PMV BLD AUTO: 11 FL (ref 9.2–12.9)
POTASSIUM SERPL-SCNC: 3.1 MMOL/L (ref 3.5–5.1)
PROT SERPL-MCNC: 7.1 G/DL (ref 6–8.4)
RBC # BLD AUTO: 3.95 M/UL (ref 4–5.4)
SODIUM SERPL-SCNC: 138 MMOL/L (ref 136–145)
STRESS ECHO POST EXERCISE DUR MIN: 0 MINUTES
STRESS ECHO POST EXERCISE DUR SEC: 50 SECONDS
SYSTOLIC BLOOD PRESSURE: 120 MMHG
T3FREE SERPL-MCNC: 2.4 PG/ML (ref 2.3–4.2)
T4 FREE SERPL-MCNC: 0.78 NG/DL (ref 0.71–1.51)
TRIGL SERPL-MCNC: 133 MG/DL (ref 30–150)
TSH SERPL DL<=0.005 MIU/L-ACNC: 1.69 UIU/ML (ref 0.4–4)
WBC # BLD AUTO: 10.12 K/UL (ref 3.9–12.7)

## 2021-10-12 PROCEDURE — 84439 ASSAY OF FREE THYROXINE: CPT | Performed by: NURSE PRACTITIONER

## 2021-10-12 PROCEDURE — 63600175 PHARM REV CODE 636 W HCPCS: Performed by: INTERNAL MEDICINE

## 2021-10-12 PROCEDURE — 80061 LIPID PANEL: CPT | Performed by: NURSE PRACTITIONER

## 2021-10-12 PROCEDURE — 93226 XTRNL ECG REC<48 HR SCAN A/R: CPT

## 2021-10-12 PROCEDURE — 82306 VITAMIN D 25 HYDROXY: CPT | Performed by: NURSE PRACTITIONER

## 2021-10-12 PROCEDURE — 78452 HT MUSCLE IMAGE SPECT MULT: CPT | Mod: 26,,, | Performed by: INTERNAL MEDICINE

## 2021-10-12 PROCEDURE — 93016 STRESS TEST WITH MYOCARDIAL PERFUSION (CUPID ONLY): ICD-10-PCS | Mod: ,,, | Performed by: INTERNAL MEDICINE

## 2021-10-12 PROCEDURE — 93227 HOLTER MONITOR - 48 HOUR (CUPID ONLY): ICD-10-PCS | Mod: ,,, | Performed by: INTERNAL MEDICINE

## 2021-10-12 PROCEDURE — 93970 EXTREMITY STUDY: CPT | Mod: 50

## 2021-10-12 PROCEDURE — 93970 CV US DOPPLER VENOUS LEGS BILATERAL (CUPID ONLY): ICD-10-PCS | Mod: 26,,, | Performed by: INTERNAL MEDICINE

## 2021-10-12 PROCEDURE — 36415 COLL VENOUS BLD VENIPUNCTURE: CPT | Performed by: NURSE PRACTITIONER

## 2021-10-12 PROCEDURE — 78452 HT MUSCLE IMAGE SPECT MULT: CPT

## 2021-10-12 PROCEDURE — 84481 FREE ASSAY (FT-3): CPT | Performed by: NURSE PRACTITIONER

## 2021-10-12 PROCEDURE — A9502 TC99M TETROFOSMIN: HCPCS

## 2021-10-12 PROCEDURE — 93018 STRESS TEST WITH MYOCARDIAL PERFUSION (CUPID ONLY): ICD-10-PCS | Mod: ,,, | Performed by: INTERNAL MEDICINE

## 2021-10-12 PROCEDURE — 93970 EXTREMITY STUDY: CPT | Mod: 26,,, | Performed by: INTERNAL MEDICINE

## 2021-10-12 PROCEDURE — 93227 XTRNL ECG REC<48 HR R&I: CPT | Mod: ,,, | Performed by: INTERNAL MEDICINE

## 2021-10-12 PROCEDURE — 85025 COMPLETE CBC W/AUTO DIFF WBC: CPT | Performed by: NURSE PRACTITIONER

## 2021-10-12 PROCEDURE — 93017 CV STRESS TEST TRACING ONLY: CPT

## 2021-10-12 PROCEDURE — 84443 ASSAY THYROID STIM HORMONE: CPT | Performed by: NURSE PRACTITIONER

## 2021-10-12 PROCEDURE — 93016 CV STRESS TEST SUPVJ ONLY: CPT | Mod: ,,, | Performed by: INTERNAL MEDICINE

## 2021-10-12 PROCEDURE — 80053 COMPREHEN METABOLIC PANEL: CPT | Performed by: NURSE PRACTITIONER

## 2021-10-12 PROCEDURE — 78452 STRESS TEST WITH MYOCARDIAL PERFUSION (CUPID ONLY): ICD-10-PCS | Mod: 26,,, | Performed by: INTERNAL MEDICINE

## 2021-10-12 PROCEDURE — 93018 CV STRESS TEST I&R ONLY: CPT | Mod: ,,, | Performed by: INTERNAL MEDICINE

## 2021-10-12 RX ORDER — REGADENOSON 0.08 MG/ML
0.4 INJECTION, SOLUTION INTRAVENOUS ONCE
Status: COMPLETED | OUTPATIENT
Start: 2021-10-12 | End: 2021-10-12

## 2021-10-12 RX ADMIN — REGADENOSON 0.4 MG: 0.08 INJECTION, SOLUTION INTRAVENOUS at 09:10

## 2021-10-13 ENCOUNTER — PATIENT MESSAGE (OUTPATIENT)
Dept: CARDIOLOGY | Facility: CLINIC | Age: 41
End: 2021-10-13
Payer: COMMERCIAL

## 2021-10-13 RX ORDER — POTASSIUM CHLORIDE 20 MEQ/1
20 TABLET, EXTENDED RELEASE ORAL DAILY
Qty: 90 TABLET | Refills: 1 | Status: SHIPPED | OUTPATIENT
Start: 2021-10-13 | End: 2024-03-14 | Stop reason: SDUPTHER

## 2021-10-14 LAB
OHS CV EVENT MONITOR DAY: 2
OHS CV HOLTER LENGTH DECIMAL HOURS: 96
OHS CV HOLTER LENGTH HOURS: 48
OHS CV HOLTER LENGTH MINUTES: 0
OHS CV HOLTER SINUS AVERAGE HR: 96
OHS CV HOLTER SINUS MAX HR: 145
OHS CV HOLTER SINUS MIN HR: 80

## 2021-10-15 DIAGNOSIS — R00.2 PALPITATION: Primary | ICD-10-CM

## 2021-11-11 ENCOUNTER — PATIENT OUTREACH (OUTPATIENT)
Dept: PULMONOLOGY | Facility: CLINIC | Age: 41
End: 2021-11-11
Payer: COMMERCIAL

## 2021-11-12 ENCOUNTER — PATIENT MESSAGE (OUTPATIENT)
Dept: RHEUMATOLOGY | Facility: CLINIC | Age: 41
End: 2021-11-12
Payer: COMMERCIAL

## 2021-11-17 ENCOUNTER — TELEPHONE (OUTPATIENT)
Dept: RHEUMATOLOGY | Facility: CLINIC | Age: 41
End: 2021-11-17
Payer: COMMERCIAL

## 2021-11-18 ENCOUNTER — OFFICE VISIT (OUTPATIENT)
Dept: RHEUMATOLOGY | Facility: CLINIC | Age: 41
End: 2021-11-18
Payer: COMMERCIAL

## 2021-11-18 DIAGNOSIS — J84.9 ILD (INTERSTITIAL LUNG DISEASE): Primary | ICD-10-CM

## 2021-11-18 DIAGNOSIS — D84.9 IMMUNOSUPPRESSED STATUS: ICD-10-CM

## 2021-11-18 DIAGNOSIS — M35.1 MCTD (MIXED CONNECTIVE TISSUE DISEASE): ICD-10-CM

## 2021-11-18 DIAGNOSIS — Z79.899 HIGH RISK MEDICATION USE: ICD-10-CM

## 2021-11-18 PROCEDURE — 3044F HG A1C LEVEL LT 7.0%: CPT | Mod: CPTII,95,, | Performed by: INTERNAL MEDICINE

## 2021-11-18 PROCEDURE — 1159F PR MEDICATION LIST DOCUMENTED IN MEDICAL RECORD: ICD-10-PCS | Mod: CPTII,95,, | Performed by: INTERNAL MEDICINE

## 2021-11-18 PROCEDURE — 1160F RVW MEDS BY RX/DR IN RCRD: CPT | Mod: CPTII,95,, | Performed by: INTERNAL MEDICINE

## 2021-11-18 PROCEDURE — 99215 OFFICE O/P EST HI 40 MIN: CPT | Mod: 95,,, | Performed by: INTERNAL MEDICINE

## 2021-11-18 PROCEDURE — 1159F MED LIST DOCD IN RCRD: CPT | Mod: CPTII,95,, | Performed by: INTERNAL MEDICINE

## 2021-11-18 PROCEDURE — 3044F PR MOST RECENT HEMOGLOBIN A1C LEVEL <7.0%: ICD-10-PCS | Mod: CPTII,95,, | Performed by: INTERNAL MEDICINE

## 2021-11-18 PROCEDURE — 99215 PR OFFICE/OUTPT VISIT, EST, LEVL V, 40-54 MIN: ICD-10-PCS | Mod: 95,,, | Performed by: INTERNAL MEDICINE

## 2021-11-18 PROCEDURE — 1160F PR REVIEW ALL MEDS BY PRESCRIBER/CLIN PHARMACIST DOCUMENTED: ICD-10-PCS | Mod: CPTII,95,, | Performed by: INTERNAL MEDICINE

## 2021-11-22 ENCOUNTER — TELEPHONE (OUTPATIENT)
Dept: PULMONOLOGY | Facility: CLINIC | Age: 41
End: 2021-11-22
Payer: COMMERCIAL

## 2021-12-27 ENCOUNTER — TELEPHONE (OUTPATIENT)
Dept: PULMONOLOGY | Facility: CLINIC | Age: 41
End: 2021-12-27
Payer: COMMERCIAL

## 2021-12-29 DIAGNOSIS — R00.2 PALPITATION: Primary | ICD-10-CM

## 2021-12-29 DIAGNOSIS — I27.20 PULMONARY HYPERTENSION: ICD-10-CM

## 2021-12-29 DIAGNOSIS — G45.9 TIA (TRANSIENT ISCHEMIC ATTACK): ICD-10-CM

## 2022-01-25 ENCOUNTER — DOCUMENTATION ONLY (OUTPATIENT)
Dept: RHEUMATOLOGY | Facility: CLINIC | Age: 42
End: 2022-01-25
Payer: COMMERCIAL

## 2022-01-31 ENCOUNTER — TELEPHONE (OUTPATIENT)
Dept: RHEUMATOLOGY | Facility: CLINIC | Age: 42
End: 2022-01-31
Payer: COMMERCIAL

## 2022-06-07 DIAGNOSIS — J84.112 IDIOPATHIC PULMONARY FIBROSIS: ICD-10-CM

## 2022-06-07 DIAGNOSIS — J84.9 ILD (INTERSTITIAL LUNG DISEASE): ICD-10-CM

## 2022-06-15 ENCOUNTER — TELEPHONE (OUTPATIENT)
Dept: RHEUMATOLOGY | Facility: CLINIC | Age: 42
End: 2022-06-15
Payer: COMMERCIAL

## 2022-07-20 DIAGNOSIS — J84.9 ILD (INTERSTITIAL LUNG DISEASE): ICD-10-CM

## 2022-07-20 DIAGNOSIS — J84.112 IDIOPATHIC PULMONARY FIBROSIS: ICD-10-CM

## 2022-07-20 NOTE — TELEPHONE ENCOUNTER
----- Message from Alexandra Kat sent at 7/20/2022  8:53 AM CDT -----  Contact: [id number]  Type:  RX Refill Request    [facility]  Refill or New Rx:refill   RX Name and Strength:nintedanib (OFEV) 150 mg Cap  How is the patient currently taking it? (ex. 1XDay):  Is this a 30 day or 90 day RX:30  Preferred Pharmacy with phone number:  [facility] [address]  [facility] [place] [address]  Phone: [phone] Fax: [phone]  Local or Mail Order:mail   Ordering Provider:Dr MANCILLA   Would the patient rather a call back or a response via [facility]? Call back   Best Call Back Number:[phone]  Additional Information:

## 2022-07-25 ENCOUNTER — TELEPHONE (OUTPATIENT)
Dept: DERMATOLOGY | Facility: CLINIC | Age: 42
End: 2022-07-25
Payer: COMMERCIAL

## 2022-07-25 NOTE — TELEPHONE ENCOUNTER
Called patient in regards to appointment being canceled a having to be rescheduled due to provider not being in office at that time

## 2023-04-26 DIAGNOSIS — R00.2 PALPITATION: Primary | ICD-10-CM

## 2023-04-27 ENCOUNTER — OFFICE VISIT (OUTPATIENT)
Dept: CARDIOLOGY | Facility: CLINIC | Age: 43
End: 2023-04-27
Payer: COMMERCIAL

## 2023-04-27 ENCOUNTER — HOSPITAL ENCOUNTER (OUTPATIENT)
Dept: CARDIOLOGY | Facility: HOSPITAL | Age: 43
Discharge: HOME | End: 2023-04-27
Attending: INTERNAL MEDICINE
Payer: COMMERCIAL

## 2023-04-27 ENCOUNTER — PATIENT MESSAGE (OUTPATIENT)
Dept: PULMONOLOGY | Facility: CLINIC | Age: 43
End: 2023-04-27
Payer: COMMERCIAL

## 2023-04-27 VITALS
BODY MASS INDEX: 44.44 KG/M2 | WEIGHT: 266.75 LBS | HEIGHT: 65 IN | SYSTOLIC BLOOD PRESSURE: 130 MMHG | HEART RATE: 97 BPM | DIASTOLIC BLOOD PRESSURE: 98 MMHG

## 2023-04-27 DIAGNOSIS — J98.4 MIXED RESTRICTIVE AND OBSTRUCTIVE LUNG DISEASE: ICD-10-CM

## 2023-04-27 DIAGNOSIS — J84.9 ILD (INTERSTITIAL LUNG DISEASE): ICD-10-CM

## 2023-04-27 DIAGNOSIS — R73.03 PREDIABETES: ICD-10-CM

## 2023-04-27 DIAGNOSIS — Z86.73 HISTORY OF TIA (TRANSIENT ISCHEMIC ATTACK): ICD-10-CM

## 2023-04-27 DIAGNOSIS — R07.9 CHEST PAIN, UNSPECIFIED TYPE: ICD-10-CM

## 2023-04-27 DIAGNOSIS — I10 ESSENTIAL HYPERTENSION, BENIGN: ICD-10-CM

## 2023-04-27 DIAGNOSIS — R00.2 PALPITATION: ICD-10-CM

## 2023-04-27 DIAGNOSIS — M32.13 OTHER SYSTEMIC LUPUS ERYTHEMATOSUS WITH LUNG INVOLVEMENT: ICD-10-CM

## 2023-04-27 DIAGNOSIS — E78.2 MIXED HYPERLIPIDEMIA: ICD-10-CM

## 2023-04-27 DIAGNOSIS — J43.9 MIXED RESTRICTIVE AND OBSTRUCTIVE LUNG DISEASE: ICD-10-CM

## 2023-04-27 DIAGNOSIS — G47.30 SLEEP APNEA, UNSPECIFIED TYPE: ICD-10-CM

## 2023-04-27 DIAGNOSIS — R60.0 LOCALIZED EDEMA: ICD-10-CM

## 2023-04-27 DIAGNOSIS — R06.09 DOE (DYSPNEA ON EXERTION): Primary | ICD-10-CM

## 2023-04-27 DIAGNOSIS — I27.20 PULMONARY HYPERTENSION: ICD-10-CM

## 2023-04-27 DIAGNOSIS — R06.09 DYSPNEA ON EXERTION: ICD-10-CM

## 2023-04-27 DIAGNOSIS — F90.9 ATTENTION DEFICIT HYPERACTIVITY DISORDER (ADHD), UNSPECIFIED ADHD TYPE: ICD-10-CM

## 2023-04-27 DIAGNOSIS — Z86.16 HISTORY OF COVID-19: ICD-10-CM

## 2023-04-27 PROCEDURE — 1159F PR MEDICATION LIST DOCUMENTED IN MEDICAL RECORD: ICD-10-PCS | Mod: CPTII,S$GLB,, | Performed by: INTERNAL MEDICINE

## 2023-04-27 PROCEDURE — 3008F PR BODY MASS INDEX (BMI) DOCUMENTED: ICD-10-PCS | Mod: CPTII,S$GLB,, | Performed by: INTERNAL MEDICINE

## 2023-04-27 PROCEDURE — 3075F SYST BP GE 130 - 139MM HG: CPT | Mod: CPTII,S$GLB,, | Performed by: INTERNAL MEDICINE

## 2023-04-27 PROCEDURE — 3080F DIAST BP >= 90 MM HG: CPT | Mod: CPTII,S$GLB,, | Performed by: INTERNAL MEDICINE

## 2023-04-27 PROCEDURE — 3075F PR MOST RECENT SYSTOLIC BLOOD PRESS GE 130-139MM HG: ICD-10-PCS | Mod: CPTII,S$GLB,, | Performed by: INTERNAL MEDICINE

## 2023-04-27 PROCEDURE — 1159F MED LIST DOCD IN RCRD: CPT | Mod: CPTII,S$GLB,, | Performed by: INTERNAL MEDICINE

## 2023-04-27 PROCEDURE — 1160F PR REVIEW ALL MEDS BY PRESCRIBER/CLIN PHARMACIST DOCUMENTED: ICD-10-PCS | Mod: CPTII,S$GLB,, | Performed by: INTERNAL MEDICINE

## 2023-04-27 PROCEDURE — 93005 ELECTROCARDIOGRAM TRACING: CPT

## 2023-04-27 PROCEDURE — 99999 PR PBB SHADOW E&M-EST. PATIENT-LVL V: ICD-10-PCS | Mod: PBBFAC,,, | Performed by: INTERNAL MEDICINE

## 2023-04-27 PROCEDURE — 99999 PR PBB SHADOW E&M-EST. PATIENT-LVL V: CPT | Mod: PBBFAC,,, | Performed by: INTERNAL MEDICINE

## 2023-04-27 PROCEDURE — 3080F PR MOST RECENT DIASTOLIC BLOOD PRESSURE >= 90 MM HG: ICD-10-PCS | Mod: CPTII,S$GLB,, | Performed by: INTERNAL MEDICINE

## 2023-04-27 PROCEDURE — 99215 PR OFFICE/OUTPT VISIT, EST, LEVL V, 40-54 MIN: ICD-10-PCS | Mod: S$GLB,,, | Performed by: INTERNAL MEDICINE

## 2023-04-27 PROCEDURE — 93010 EKG 12-LEAD: ICD-10-PCS | Mod: ,,, | Performed by: STUDENT IN AN ORGANIZED HEALTH CARE EDUCATION/TRAINING PROGRAM

## 2023-04-27 PROCEDURE — 3008F BODY MASS INDEX DOCD: CPT | Mod: CPTII,S$GLB,, | Performed by: INTERNAL MEDICINE

## 2023-04-27 PROCEDURE — 99215 OFFICE O/P EST HI 40 MIN: CPT | Mod: S$GLB,,, | Performed by: INTERNAL MEDICINE

## 2023-04-27 PROCEDURE — 1160F RVW MEDS BY RX/DR IN RCRD: CPT | Mod: CPTII,S$GLB,, | Performed by: INTERNAL MEDICINE

## 2023-04-27 PROCEDURE — 93010 ELECTROCARDIOGRAM REPORT: CPT | Mod: ,,, | Performed by: STUDENT IN AN ORGANIZED HEALTH CARE EDUCATION/TRAINING PROGRAM

## 2023-04-27 RX ORDER — QUETIAPINE FUMARATE 50 MG/1
50 TABLET, FILM COATED ORAL NIGHTLY
COMMUNITY
Start: 2023-04-16

## 2023-04-27 RX ORDER — BENZONATATE 200 MG/1
200 CAPSULE ORAL 3 TIMES DAILY PRN
COMMUNITY
Start: 2023-04-10

## 2023-04-27 NOTE — PROGRESS NOTES
Subjective:   Patient ID:  Daija Luna is a 42 y.o. female who presents for cardiac consult of No chief complaint on file.      The patient came in today for cardiac consult of No chief complaint on file.    Daija Luna is a 42 y.o. female pt with HTN, ADHD, obesity, Hypothyroidism, TIA - 2018, SLE, depression, ILD, h/o COVID 19 presents for follow up CV eval.       6/7/21  Pt's ECHO last week with normal BI V function. She saw Dr. Luna in 2020. She has been coughing in 2019 for about 2 months, she had pulm eval had bronch, had elevated CRP and then saw Dr. MANCILLA for lupus on cellcept and plaquenil. She will start infusions and has been having more desatting episodes. She had treadmill stress test but desatted and had PVCs was started on BB. But started on CCB.     8/4/21  Nuclear stress, carotid u/s and Holter pending. She has not gotten them rescheduled had been sick earlier. She started Rituxan infusions, and will start Ofev. She is holding Verapamil due to possible side effects with Ofev. She also wants further pulm HTN workup/treatment, discussed will get testing rescheduled and refer for pulm HTN clinic.     4/27/23  Follow up since 8/2021. Nuclear stress neg for ischemia 10/2021. Holter neg 10/2021. LE u/s neg for DVT 10/2021.     Recent pulm visit -Erlin Moore MD  - interstitial lung disease. They are supposed to be on Imuran however it was never started secondary to her only doing baseline labs today.. They report that they are breathing has worsened. We have been trying to wean her prednisone to as low as a level as possible and she had been off of it for a few days, before her shortness of breath returned. Their current symptoms are a new cough, normally minimally productive but now productive of lots of green phlegm. They have not been admitted to the hospital since last being seen she was seen in the emergency room at the [facility] and a CT scan of the chest was obtained.  They are on oxygen at home but does not use it at work.- Erlin Moore MD      Pt had recent eval with pulm Dr. Moore with changes in meds, tried to wean pred. Pt is unable to walk without oxygen - can drop to 70s. She still has palpitations, CP , SOB.   BP - 130s/90s. HR 97. BMI 44 - 266    Prior Holter inconclusive as pt did not wear it more than a few hours.   ECG - NSR, inc RBB, RVH, nonst    Patient feels  no leg swelling, no PND, no dizziness, no syncope, no CNS symptoms.    Patient has fairly good exercise tolerance. 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    FH - father - lung CA      Conclusion 10/2021      Normal myocardial perfusion scan. There is no evidence of myocardial ischemia or infarction.    There is a mild to moderate intensity defect in the anterior wall of the left ventricle, secondary to breast attenuation.    The gated perfusion images showed an ejection fraction of 76% at rest. The gated perfusion images showed an ejection fraction of 65% post stress.    The EKG portion of this study is negative for ischemia.    The patient reported no chest pain during the stress test.    There were no arrhythmias during stress.    Conclusion 10/2021    There were rare hookup related artifacts. Overall, the study was of good quality. The tape was adequate (2 days , 48 hours, 0 minutes).  Sinus rhythm with heart rates varying between 80 and 145 BPM with an average of 96 BPM.  There were PVCs totalling 0 and averaging 0 per hour.  There were PACs totalling 0 and averaging 0 per hour.    Conclusion 10/2021    There is no evidence of a right lower extremity DVT.  There is no evidence of a left lower extremity DVT.    Results for orders placed during the hospital encounter of 06/02/21    Echo Color Flow Doppler? Yes    Interpretation Summary  · The left ventricle is normal in size with moderate concentric hypertrophy and normal systolic function.  · The estimated ejection  fraction is 55%.  · Normal left ventricular diastolic function.  · Mild mitral regurgitation.  · Trivial posterior pericardial effusion.  · Normal central venous pressure (3 mmHg).  · The estimated PA systolic pressure is 34 mmHg.  · Normal right ventricular size with normal right ventricular systolic function.      Normal sinus rhythm   Right bundle branch block   Abnormal ECG   No previous ECGs available   Confirmed by ISABELL MADDEN, [facility] ([id number]) on 6/2/2021 11:22:29 PM     Past Medical History:   Diagnosis Date    ADHD (attention deficit hyperactivity disorder)     Asthma     Hypertension     Hypothyroidism     Mixed restrictive and obstructive lung disease     Rheumatoid arthritis flare 6/22/2021    TIA (transient ischemic attack)        Past Surgical History:   Procedure Laterality Date    BRONCHOSCOPY Bilateral 8/16/2019    Procedure: Bronchoscopy;  Surgeon: Virgilio Weiss MD;  Location: [facility];  Service: Endoscopy;  Laterality: Bilateral;       Social History     Tobacco Use    Smoking status: Never    Smokeless tobacco: Never   Substance Use Topics    Alcohol use: Not Currently     Comment: social    Drug use: No       Family History   Problem Relation Age of Onset    Cancer Maternal Aunt         Breast, Back, Lung Cancer    Cancer Father        Patient's Medications   New Prescriptions    No medications on file   Previous Medications    ALBUTEROL 90 MCG/ACTUATION INHALER    Inhale 2 puffs into the lungs every 6 (six) hours as needed for Wheezing.    ALBUTEROL-IPRATROPIUM (DUO-NEB) 2.5 MG-0.5 MG/3 ML NEBULIZER SOLUTION    TAKE 3 MLS BY NEBULIZATION EVERY 6 (SIX) HOURS AS NEEDED FOR WHEEZING. RESCUE    ALPRAZOLAM (XANAX) 0.25 MG TABLET    TAKE 1 TABLET (0.25 MG TOTAL) BY MOUTH 2 (TWO) TIMES DAILY AS NEEDED FOR ANXIETY.    ARMOUR THYROID 30 MG TAB    Take 30 mg by mouth once daily.    BENZONATATE (TESSALON) 200 MG CAPSULE    Take 200 mg by mouth 3 (three) times daily as needed.    BUMETANIDE (BUMEX) 2 MG  TABLET    Take 2 mg by mouth 2 (two) times daily.    DEXTROAMPHETAMINE-AMPHETAMINE (ADDERALL) 15 MG TABLET        DULERA 200-5 MCG/ACTUATION INHALER    TAKE 2 PUFFS BY MOUTH TWICE A DAY    ERGOCALCIFEROL (ERGOCALCIFEROL) 50,000 UNIT CAP    Take 50,000 Units by mouth every 7 days.    ESZOPICLONE (LUNESTA) 3 MG TAB    Take 3 mg by mouth every evening.    EZETIMIBE (ZETIA) 10 MG TABLET    Take 1 tablet (10 mg total) by mouth once daily.    FLUTICASONE (FLONASE) 50 MCG/ACTUATION NASAL SPRAY    2 sprays (100 mcg total) by Each Nare route once daily.    FOLIC ACID (FOLVITE) 1 MG TABLET    Take 1,000 mcg by mouth once daily.    GABAPENTIN (NEURONTIN) 100 MG CAPSULE    Take 100-200 mg by mouth nightly.    HYDROXYCHLOROQUINE (PLAQUENIL) 200 MG TABLET    Take 1 tablet (200 mg total) by mouth 2 (two) times daily.    MAGNESIUM OXIDE (MAG-OX) 400 MG (241.3 MG MAGNESIUM) TABLET    Take 1 tablet (400 mg total) by mouth once daily.    MONTELUKAST (SINGULAIR) 10 MG TABLET    SMARTSI Tablet(s) By Mouth Every Evening    MYCOPHENOLATE (CELLCEPT) 500 MG TAB    Take 3 tablets (1,500 mg total) by mouth 2 (two) times daily.    NINTEDANIB (OFEV) 150 MG CAP    Take 1 capsule (150 mg total) by mouth every 12 (twelve) hours.    POTASSIUM CHLORIDE SA (K-DUR,KLOR-CON) 20 MEQ TABLET    Take 1 tablet (20 mEq total) by mouth once daily.    PREDNISONE (DELTASONE) 10 MG TABLET    TAKE 1 TABLET BY MOUTH EVERY DAY    QUETIAPINE (SEROQUEL) 50 MG TABLET    Take 50 mg by mouth every evening.    SULFAMETHOXAZOLE-TRIMETHOPRIM 800-160MG (BACTRIM DS) 800-160 MG TAB    TAKE 1 TABLET BY MOUTH THREE TIMES WEEKLY    VERAPAMIL (CALAN-SR) 180 MG CR TABLET    Take 180 mg by mouth once daily.    VILAZODONE (VIIBRYD) 10 MG (7)- 20 MG (23) DSPK       Modified Medications    No medications on file   Discontinued Medications    FLUOCINOLONE (DERMA-SMOOTHE) 0.01 % EXTERNAL OIL    Apply oil to scalp twice a day    NINTEDANIB (OFEV) 150 MG CAP    Test claim only  "      Review of Systems   Constitutional:  Positive for malaise/fatigue.   HENT: Negative.     Eyes: Negative.    Respiratory:  Positive for shortness of breath.    Cardiovascular:  Positive for palpitations and leg swelling.   Gastrointestinal: Negative.    Genitourinary: Negative.    Musculoskeletal: Negative.    Skin: Negative.    Neurological: Negative.    Endo/Heme/Allergies: Negative.    Psychiatric/Behavioral: Negative.     All 12 systems otherwise negative.    Wt Readings from Last 3 Encounters:   04/27/23 121 kg (266 lb 12.1 oz)   08/03/21 113.9 kg (251 lb)   07/20/21 117.5 kg (259 lb 0.7 oz)     Temp Readings from Last 3 Encounters:   08/03/21 97.7 °F (36.5 °C)   07/20/21 97.9 °F (36.6 °C)   03/27/20 98.1 °F (36.7 °C)     BP Readings from Last 3 Encounters:   04/27/23 (!) 130/98   08/03/21 (!) 144/79   07/20/21 123/82     Pulse Readings from Last 3 Encounters:   04/27/23 97   08/03/21 97   07/20/21 82       BP (!) 130/98   Pulse 97   Ht 5' 5" (1.651 m)   Wt 121 kg (266 lb 12.1 oz)   BMI 44.39 kg/m²     Objective:   Physical Exam  Constitutional:       General: She is not in acute distress.     Appearance: She is well-developed. She is not diaphoretic.   HENT:      Head: Normocephalic and atraumatic.      Mouth/Throat:      Pharynx: No oropharyngeal exudate.   Eyes:      General: No scleral icterus.        Right eye: No discharge.         Left eye: No discharge.   Pulmonary:      Effort: Pulmonary effort is normal. No respiratory distress.   Skin:     Coloration: Skin is not pale.      Findings: No erythema or rash.   Neurological:      Mental Status: She is alert and oriented to person, place, and time.   Psychiatric:         Behavior: Behavior normal.         Thought Content: Thought content normal.         Judgment: Judgment normal.       Lab Results   Component Value Date     10/12/2021    K 3.1 (L) 10/12/2021    CL 98 10/12/2021    CO2 26 10/12/2021    BUN 11 10/12/2021    CREATININE 0.9 " 10/12/2021    GLU 86 10/12/2021    HGBA1C 5.7 (H) 02/08/2021    MG 2.0 06/07/2021    AST 27 10/12/2021    ALT 23 10/12/2021    ALBUMIN 3.4 (L) 10/12/2021    PROT 7.1 10/12/2021    BILITOT 0.4 10/12/2021    WBC 10.12 10/12/2021    HGB 10.3 (L) 10/12/2021    HCT 34.2 (L) 10/12/2021    MCV 87 10/12/2021     10/12/2021    TSH 1.687 10/12/2021    CHOL 238 (H) 10/12/2021    HDL 79 (H) 10/12/2021    LDLCALC 132.4 10/12/2021    LDLCALC 220 (H) 07/25/2019    TRIG 133 10/12/2021    BNP <10 06/07/2021     Assessment:      1. BRADY (dyspnea on exertion)    2. ILD (interstitial lung disease)    3. Pulmonary hypertension    4. Essential hypertension, benign    5. Mixed restrictive and obstructive lung disease    6. Dyspnea on exertion    7. Other systemic lupus erythematosus with lung involvement    8. Sleep apnea, unspecified type    9. Localized edema    10. Palpitation    11. Mixed hyperlipidemia    12. History of TIA (transient ischemic attack)    13. Attention deficit hyperactivity disorder (ADHD), unspecified ADHD type    14. Chest pain, unspecified type    15. History of COVID-19    16. Prediabetes           Plan:   1. HTN with edema  - titrate meds  - LE u/s neg for DVT 10/2021.     2. HLD with elevated TGs  - uncontrolled   - cont meds  - low minerva diet  - add zetia    3. ILD with restrictive and obstructive lung disease  - cont tx per pulm    4. SLE  - cont tx per PCP/rheum    5. H/O TIA  - rec asa, statin  - was on statin in past but caused interaction with lupus meds  - f/u neuro  - add zetia    6. BRADY, palpitations, h/o PVCs; h/o COVID 19  - was admitted to [facility]  -had   - Pulm HTN - discussed [facility] if symptoms are worse,  referral to PHTN clinic - ordered in past  - r/o EMELIA   - Prior Holter inconclusive as pt did not wear it more than a few hours.   - does not want to take verapamil with Ofev due to potential side effects     7. ADHD  - cont tx if needed - stimulants     8. Obesity, BMI 44 - 266 lbs, PreDM  - cont  weight loss   - order PreDm    Thank you for allowing me to participate in this patient's care. Please do not hesitate to contact me with any questions or concerns. Consult note has been forwarded to the referral physician.

## 2023-04-28 ENCOUNTER — PATIENT MESSAGE (OUTPATIENT)
Dept: CARDIOLOGY | Facility: CLINIC | Age: 43
End: 2023-04-28
Payer: COMMERCIAL

## 2023-04-28 RX ORDER — EZETIMIBE 10 MG/1
10 TABLET ORAL DAILY
Qty: 30 TABLET | Refills: 3 | Status: SHIPPED | OUTPATIENT
Start: 2023-04-28 | End: 2024-03-14

## 2023-04-28 RX ORDER — PRAVASTATIN SODIUM 20 MG/1
20 TABLET ORAL NIGHTLY
Qty: 90 TABLET | Refills: 1 | Status: SHIPPED | OUTPATIENT
Start: 2023-04-28 | End: 2023-05-22

## 2023-04-28 RX ORDER — SEMAGLUTIDE 1.34 MG/ML
0.25 INJECTION, SOLUTION SUBCUTANEOUS
Qty: 0.75 ML | Refills: 2 | Status: SHIPPED | OUTPATIENT
Start: 2023-04-28 | End: 2023-05-25 | Stop reason: SDUPTHER

## 2023-05-02 ENCOUNTER — OFFICE VISIT (OUTPATIENT)
Dept: PULMONOLOGY | Facility: CLINIC | Age: 43
End: 2023-05-02
Payer: COMMERCIAL

## 2023-05-02 ENCOUNTER — TELEPHONE (OUTPATIENT)
Dept: SLEEP MEDICINE | Facility: CLINIC | Age: 43
End: 2023-05-02
Payer: COMMERCIAL

## 2023-05-02 VITALS
HEIGHT: 65 IN | RESPIRATION RATE: 21 BRPM | BODY MASS INDEX: 43.96 KG/M2 | HEART RATE: 95 BPM | OXYGEN SATURATION: 97 % | SYSTOLIC BLOOD PRESSURE: 120 MMHG | DIASTOLIC BLOOD PRESSURE: 82 MMHG | WEIGHT: 263.88 LBS

## 2023-05-02 DIAGNOSIS — Z29.89 NEED FOR PNEUMOCYSTIS PROPHYLAXIS: ICD-10-CM

## 2023-05-02 DIAGNOSIS — I27.20 PULMONARY HYPERTENSION: ICD-10-CM

## 2023-05-02 DIAGNOSIS — J84.9 ILD (INTERSTITIAL LUNG DISEASE): ICD-10-CM

## 2023-05-02 DIAGNOSIS — G47.30 SLEEP-DISORDERED BREATHING: Primary | ICD-10-CM

## 2023-05-02 PROCEDURE — 3008F PR BODY MASS INDEX (BMI) DOCUMENTED: ICD-10-PCS | Mod: CPTII,S$GLB,, | Performed by: HOSPITALIST

## 2023-05-02 PROCEDURE — 3074F PR MOST RECENT SYSTOLIC BLOOD PRESSURE < 130 MM HG: ICD-10-PCS | Mod: CPTII,S$GLB,, | Performed by: HOSPITALIST

## 2023-05-02 PROCEDURE — 3079F DIAST BP 80-89 MM HG: CPT | Mod: CPTII,S$GLB,, | Performed by: HOSPITALIST

## 2023-05-02 PROCEDURE — 3008F BODY MASS INDEX DOCD: CPT | Mod: CPTII,S$GLB,, | Performed by: HOSPITALIST

## 2023-05-02 PROCEDURE — 99214 OFFICE O/P EST MOD 30 MIN: CPT | Mod: S$GLB,,, | Performed by: HOSPITALIST

## 2023-05-02 PROCEDURE — 3079F PR MOST RECENT DIASTOLIC BLOOD PRESSURE 80-89 MM HG: ICD-10-PCS | Mod: CPTII,S$GLB,, | Performed by: HOSPITALIST

## 2023-05-02 PROCEDURE — 3044F HG A1C LEVEL LT 7.0%: CPT | Mod: CPTII,S$GLB,, | Performed by: HOSPITALIST

## 2023-05-02 PROCEDURE — 1160F PR REVIEW ALL MEDS BY PRESCRIBER/CLIN PHARMACIST DOCUMENTED: ICD-10-PCS | Mod: CPTII,S$GLB,, | Performed by: HOSPITALIST

## 2023-05-02 PROCEDURE — 99999 PR PBB SHADOW E&M-EST. PATIENT-LVL V: CPT | Mod: PBBFAC,,, | Performed by: HOSPITALIST

## 2023-05-02 PROCEDURE — 1160F RVW MEDS BY RX/DR IN RCRD: CPT | Mod: CPTII,S$GLB,, | Performed by: HOSPITALIST

## 2023-05-02 PROCEDURE — 1159F MED LIST DOCD IN RCRD: CPT | Mod: CPTII,S$GLB,, | Performed by: HOSPITALIST

## 2023-05-02 PROCEDURE — 99999 PR PBB SHADOW E&M-EST. PATIENT-LVL V: ICD-10-PCS | Mod: PBBFAC,,, | Performed by: HOSPITALIST

## 2023-05-02 PROCEDURE — 3044F PR MOST RECENT HEMOGLOBIN A1C LEVEL <7.0%: ICD-10-PCS | Mod: CPTII,S$GLB,, | Performed by: HOSPITALIST

## 2023-05-02 PROCEDURE — 99214 PR OFFICE/OUTPT VISIT, EST, LEVL IV, 30-39 MIN: ICD-10-PCS | Mod: S$GLB,,, | Performed by: HOSPITALIST

## 2023-05-02 PROCEDURE — 3074F SYST BP LT 130 MM HG: CPT | Mod: CPTII,S$GLB,, | Performed by: HOSPITALIST

## 2023-05-02 PROCEDURE — 1159F PR MEDICATION LIST DOCUMENTED IN MEDICAL RECORD: ICD-10-PCS | Mod: CPTII,S$GLB,, | Performed by: HOSPITALIST

## 2023-05-02 RX ORDER — PROMETHAZINE HYDROCHLORIDE 6.25 MG/5ML
SYRUP ORAL
COMMUNITY
Start: 2023-04-10

## 2023-05-02 NOTE — ASSESSMENT & PLAN NOTE
- in the setting of lupus  - Followed by Dr. Moore, on Ofev and Prednisone 20mg daily, last seen 4/2023, getting reestablished with Rheum  - wears O2 at night  - feels that respiratory status has been good over the past several days

## 2023-05-02 NOTE — PROGRESS NOTES
Subjective:      Patient ID: Daija Luna is a 42 y.o. female.    Chief Complaint: Pulmonary HTN    HPI 5/2/23:  42 year old female with history of hx TIA, pulmonary fibrosis 2/2 Lupus (s/p lung biopsy 4/2019, on Prednisone 20mg daily, Ofev 150mg BID, managed by Dr. Moore) who was referred to Pulmonary clinic for evaluation of EMELIA. Mrs. Luna states she has been feeling well lately from a respiratory standpoint.     Last saw Dr. Moore 4/10/2023 for increased shortness of breath after steroids were weaned. Plan at that time was to restart Prednisone (40mg daily for one week, then 20mg daily) and attempt weaning again once Imuran started.     Saw Dr. Mcclain 4/27- referred to pulmonary HTN clinic, referral sent for EMELIA evaluation, updated Cardiac testing ordered and scheduled for 5/3/23.    Pulmonary Interventions:   Prednisone 20mg daily  Ofev  Bactrim prophylaxis  Home O2- wears at night and as needed    Pertinent Work Up:  ECHO 5/2021- EF 55%, mild MR, ePASP 34mmHg, normal RV size and systolic function    EMELIA Evaluation:     Time Awake: Wakes up through the night  Time Asleep: 3298-8638, plays card games on phone or reads    Shift Work: Yes LPN, did night shifts for 15 years, on days now but occasionally picks up night shifts  Sleep Aids: Yes Seroquel 50mg    [place] Sleepiness Scale TOTAL =   (validated sleepiness questionnaire with a higher score indicating greater sleepiness; range 0-24)    STOP-Bang Questionnaire   [x] Snoring    [x]  Tired/Fatigued/Sleepy  [] Obstruction (apneas/choking)  [] Pressure (HTN)  [x] BMI >35  [] Age >50  [x] Neck >40 cm  [] Gender male  STOP-Bang = 4 (low risk 0-2,high risk 3-8)    Neck circumference:  17 inches   [?EMELIA risk if >16.9 in male or >16.1 in female]    Sleep position:   Supine = frequent   Non-supine = frequent    Mouth breathing during sleep:  frequent     BP Readings from Last 3 Encounters:   04/27/23 (!) 130/98   08/03/21 (!) 144/79   07/20/21 123/82        Review of Systems   Respiratory:  Positive for snoring, shortness of breath, dyspnea on extertion and somnolence.    Objective:     Physical Exam   Constitutional: She is oriented to person, place, and time. She appears well-developed and well-nourished. She is obese.   Cardiovascular: Normal rate and regular rhythm.   Pulmonary/Chest:   Fine crackles at bases on inspiration and expiration, normal effort at rest on room air, SpO2 97%   Musculoskeletal:         General: No edema.   Neurological: She is alert and oriented to person, place, and time.   Skin: Skin is warm and dry.   Psychiatric: She has a normal mood and affect.   Personal Diagnostic Review  As Above  No flowsheet data found.     Assessment:     No diagnosis found.     Outpatient Encounter Medications as of 5/2/2023   Medication Sig Dispense Refill    albuterol 90 mcg/actuation inhaler Inhale 2 puffs into the lungs every 6 (six) hours as needed for Wheezing. 1 Inhaler 1    albuterol-ipratropium (DUO-NEB) 2.5 mg-0.5 mg/3 mL nebulizer solution TAKE 3 MLS BY NEBULIZATION EVERY 6 (SIX) HOURS AS NEEDED FOR WHEEZING. RESCUE 90 mL 0    ALPRAZolam (XANAX) 0.25 MG tablet TAKE 1 TABLET (0.25 MG TOTAL) BY MOUTH 2 (TWO) TIMES DAILY AS NEEDED FOR ANXIETY. 30 tablet 0    ARMOUR THYROID 30 mg Tab Take 30 mg by mouth once daily.      benzonatate (TESSALON) 200 MG capsule Take 200 mg by mouth 3 (three) times daily as needed.      bumetanide (BUMEX) 2 MG tablet Take 2 mg by mouth 2 (two) times daily.      dextroamphetamine-amphetamine (ADDERALL) 15 mg tablet       DULERA 200-5 mcg/actuation inhaler TAKE 2 PUFFS BY MOUTH TWICE A DAY 13 g 11    ergocalciferol (ERGOCALCIFEROL) 50,000 unit Cap Take 50,000 Units by mouth every 7 days.      eszopiclone (LUNESTA) 3 mg Tab Take 3 mg by mouth every evening.      ezetimibe (ZETIA) 10 mg tablet Take 1 tablet (10 mg total) by mouth once daily. 30 tablet 3    fluticasone (FLONASE) 50 mcg/actuation nasal spray 2 sprays (100 mcg  total) by Each Nare route once daily. 16 g 2    folic acid (FOLVITE) 1 MG tablet Take 1,000 mcg by mouth once daily.      gabapentin (NEURONTIN) 100 MG capsule Take 100-200 mg by mouth nightly.      hydrOXYchloroQUINE (PLAQUENIL) 200 mg tablet Take 1 tablet (200 mg total) by mouth 2 (two) times daily. 60 tablet 6    magnesium oxide (MAG-OX) 400 mg (241.3 mg magnesium) tablet Take 1 tablet (400 mg total) by mouth once daily. 90 tablet 1    montelukast (SINGULAIR) 10 mg tablet SMARTSI Tablet(s) By Mouth Every Evening      mycophenolate (CELLCEPT) 500 mg Tab Take 3 tablets (1,500 mg total) by mouth 2 (two) times daily. (Patient not taking: Reported on 2023) 180 tablet 2    nintedanib (OFEV) 150 mg Cap Take 1 capsule (150 mg total) by mouth every 12 (twelve) hours. 60 capsule 11    potassium chloride SA (K-DUR,KLOR-CON) 20 MEQ tablet Take 1 tablet (20 mEq total) by mouth once daily. 90 tablet 1    pravastatin (PRAVACHOL) 20 MG tablet Take 1 tablet (20 mg total) by mouth every evening. 90 tablet 1    predniSONE (DELTASONE) 10 MG tablet TAKE 1 TABLET BY MOUTH EVERY DAY 30 tablet 3    QUEtiapine (SEROQUEL) 50 MG tablet Take 50 mg by mouth every evening.      semaglutide (OZEMPIC) 0.25 mg or 0.5 mg(2 mg/1.5 mL) pen injector Inject 0.25 mg into the skin every 7 days. 0.75 mL 2    sulfamethoxazole-trimethoprim 800-160mg (BACTRIM DS) 800-160 mg Tab TAKE 1 TABLET BY MOUTH THREE TIMES WEEKLY 12 tablet 5    verapamiL (CALAN-SR) 180 MG CR tablet Take 180 mg by mouth once daily.      vilazodone (VIIBRYD) 10 mg (7)- 20 mg (23) DsPk       [DISCONTINUED] atorvastatin (LIPITOR) 20 MG tablet Take 1 tablet (20 mg total) by mouth every evening. 30 tablet 2     No facility-administered encounter medications on file as of 2023.     No orders of the defined types were placed in this encounter.      Plan:     Problem List Items Addressed This Visit          Pulmonary    ILD (interstitial lung disease)     - in the setting of  lupus  - Followed by Dr. Moore, on Ofev and Prednisone 20mg daily, last seen 4/2023, getting reestablished with Rheum  - wears O2 at night  - feels that respiratory status has been good over the past several days           Pulmonary hypertension     - ECHO scheduled for tomorrow  - referral sent to Pulmonary HTN clinic by Dr. Johny VENTURA    Need for pneumocystis prophylaxis     - on Bactrim              Other    Sleep-disordered breathing - Primary     - home sleep study ordered             Relevant Orders    Home Sleep Study     Plan discussed with patient and she expressed understanding, all questions answered. RTC 1 month with sleep provider.

## 2023-05-03 ENCOUNTER — HOSPITAL ENCOUNTER (OUTPATIENT)
Dept: CARDIOLOGY | Facility: HOSPITAL | Age: 43
Discharge: HOME | End: 2023-05-03
Attending: INTERNAL MEDICINE
Payer: COMMERCIAL

## 2023-05-03 VITALS
DIASTOLIC BLOOD PRESSURE: 82 MMHG | BODY MASS INDEX: 43.82 KG/M2 | WEIGHT: 263 LBS | SYSTOLIC BLOOD PRESSURE: 120 MMHG | HEIGHT: 65 IN

## 2023-05-03 DIAGNOSIS — G47.30 SLEEP APNEA, UNSPECIFIED TYPE: ICD-10-CM

## 2023-05-03 DIAGNOSIS — J43.9 MIXED RESTRICTIVE AND OBSTRUCTIVE LUNG DISEASE: ICD-10-CM

## 2023-05-03 DIAGNOSIS — R07.9 CHEST PAIN, UNSPECIFIED TYPE: ICD-10-CM

## 2023-05-03 DIAGNOSIS — R06.09 DOE (DYSPNEA ON EXERTION): ICD-10-CM

## 2023-05-03 DIAGNOSIS — I27.20 PULMONARY HYPERTENSION: ICD-10-CM

## 2023-05-03 DIAGNOSIS — J84.9 ILD (INTERSTITIAL LUNG DISEASE): ICD-10-CM

## 2023-05-03 DIAGNOSIS — I10 ESSENTIAL HYPERTENSION, BENIGN: ICD-10-CM

## 2023-05-03 DIAGNOSIS — R00.2 PALPITATION: ICD-10-CM

## 2023-05-03 DIAGNOSIS — R06.09 DYSPNEA ON EXERTION: ICD-10-CM

## 2023-05-03 DIAGNOSIS — Z86.73 HISTORY OF TIA (TRANSIENT ISCHEMIC ATTACK): ICD-10-CM

## 2023-05-03 DIAGNOSIS — J98.4 MIXED RESTRICTIVE AND OBSTRUCTIVE LUNG DISEASE: ICD-10-CM

## 2023-05-03 DIAGNOSIS — F90.9 ATTENTION DEFICIT HYPERACTIVITY DISORDER (ADHD), UNSPECIFIED ADHD TYPE: ICD-10-CM

## 2023-05-03 DIAGNOSIS — M32.13 OTHER SYSTEMIC LUPUS ERYTHEMATOSUS WITH LUNG INVOLVEMENT: ICD-10-CM

## 2023-05-03 PROCEDURE — 93248 EXT ECG>7D<15D REV&INTERPJ: CPT | Mod: ,,, | Performed by: INTERNAL MEDICINE

## 2023-05-03 PROCEDURE — 93306 ECHO (CUPID ONLY): ICD-10-PCS | Mod: 26,,, | Performed by: STUDENT IN AN ORGANIZED HEALTH CARE EDUCATION/TRAINING PROGRAM

## 2023-05-03 PROCEDURE — 93306 TTE W/DOPPLER COMPLETE: CPT

## 2023-05-03 PROCEDURE — 93306 TTE W/DOPPLER COMPLETE: CPT | Mod: 26,,, | Performed by: STUDENT IN AN ORGANIZED HEALTH CARE EDUCATION/TRAINING PROGRAM

## 2023-05-03 PROCEDURE — 93248 CV CARDIAC MONITOR - 3-15 DAY ADULT (CUPID ONLY): ICD-10-PCS | Mod: ,,, | Performed by: INTERNAL MEDICINE

## 2023-05-04 LAB
AORTIC ROOT ANNULUS: 2.93 CM
ASCENDING AORTA: 2.61 CM
AV INDEX (PROSTH): 0.77
AV MEAN GRADIENT: 4 MMHG
AV PEAK GRADIENT: 6 MMHG
AV VALVE AREA: 2.48 CM2
AV VELOCITY RATIO: 0.74
BSA FOR ECHO PROCEDURE: 2.34 M2
CV ECHO LV RWT: 0.51 CM
DOP CALC AO PEAK VEL: 1.26 M/S
DOP CALC AO VTI: 23.6 CM
DOP CALC LVOT AREA: 3.2 CM2
DOP CALC LVOT DIAMETER: 2.03 CM
DOP CALC LVOT PEAK VEL: 0.93 M/S
DOP CALC LVOT STROKE VOLUME: 58.55 CM3
DOP CALCLVOT PEAK VEL VTI: 18.1 CM
E WAVE DECELERATION TIME: 153.23 MSEC
E/A RATIO: 0.83
E/E' RATIO: 9.41 M/S
ECHO LV POSTERIOR WALL: 1.34 CM (ref 0.6–1.1)
EJECTION FRACTION: 65 %
FRACTIONAL SHORTENING: 38 % (ref 28–44)
INTERVENTRICULAR SEPTUM: 1.17 CM (ref 0.6–1.1)
IVC DIAMETER: 1.58 CM
IVRT: 97.05 MSEC
LA MAJOR: 5.26 CM
LA MINOR: 5.23 CM
LA WIDTH: 4.3 CM
LEFT ATRIUM SIZE: 3.46 CM
LEFT ATRIUM VOLUME INDEX MOD: 26.4 ML/M2
LEFT ATRIUM VOLUME INDEX: 29.9 ML/M2
LEFT ATRIUM VOLUME MOD: 58.63 CM3
LEFT ATRIUM VOLUME: 66.33 CM3
LEFT INTERNAL DIMENSION IN SYSTOLE: 3.23 CM (ref 2.1–4)
LEFT VENTRICLE DIASTOLIC VOLUME INDEX: 59.29 ML/M2
LEFT VENTRICLE DIASTOLIC VOLUME: 131.63 ML
LEFT VENTRICLE MASS INDEX: 121 G/M2
LEFT VENTRICLE SYSTOLIC VOLUME INDEX: 18.9 ML/M2
LEFT VENTRICLE SYSTOLIC VOLUME: 41.89 ML
LEFT VENTRICULAR INTERNAL DIMENSION IN DIASTOLE: 5.24 CM (ref 3.5–6)
LEFT VENTRICULAR MASS: 268.18 G
LV LATERAL E/E' RATIO: 8 M/S
LV SEPTAL E/E' RATIO: 11.43 M/S
LVOT MG: 2.03 MMHG
LVOT MV: 0.67 CM/S
MV PEAK A VEL: 0.96 M/S
MV PEAK E VEL: 0.8 M/S
MV STENOSIS PRESSURE HALF TIME: 44.44 MS
MV VALVE AREA P 1/2 METHOD: 4.95 CM2
PISA TR MAX VEL: 3.4 M/S
PV MV: 0.47 M/S
PV PEAK VELOCITY: 0.76 CM/S
RA MAJOR: 4.8 CM
RA PRESSURE: 3 MMHG
RA WIDTH: 2.9 CM
RIGHT VENTRICULAR END-DIASTOLIC DIMENSION: 2.92 CM
SINUS: 2.89 CM
STJ: 2.42 CM
TDI LATERAL: 0.1 M/S
TDI SEPTAL: 0.07 M/S
TDI: 0.09 M/S
TR MAX PG: 46 MMHG
TV REST PULMONARY ARTERY PRESSURE: 49 MMHG

## 2023-05-05 ENCOUNTER — TELEPHONE (OUTPATIENT)
Dept: CARDIOLOGY | Facility: CLINIC | Age: 43
End: 2023-05-05
Payer: COMMERCIAL

## 2023-05-05 ENCOUNTER — PATIENT MESSAGE (OUTPATIENT)
Dept: CARDIOLOGY | Facility: CLINIC | Age: 43
End: 2023-05-05
Payer: COMMERCIAL

## 2023-05-05 NOTE — TELEPHONE ENCOUNTER
Pt verbalized understanding with no questions or concerns     Normal echo  ----- Message -----  From: Joi Llanes MD  Sent: 5/4/2023   9:06 PM CDT  To: Kain Mcclain MD

## 2023-05-17 ENCOUNTER — TELEPHONE (OUTPATIENT)
Dept: TRANSPLANT | Facility: CLINIC | Age: 43
End: 2023-05-17
Payer: COMMERCIAL

## 2023-05-17 DIAGNOSIS — I27.9 CHRONIC PULMONARY HEART DISEASE: ICD-10-CM

## 2023-05-17 DIAGNOSIS — Z79.899 POLYPHARMACY: ICD-10-CM

## 2023-05-17 DIAGNOSIS — R06.82 TACHYPNEA: Primary | ICD-10-CM

## 2023-05-22 ENCOUNTER — TELEPHONE (OUTPATIENT)
Dept: RHEUMATOLOGY | Facility: CLINIC | Age: 43
End: 2023-05-22

## 2023-05-22 ENCOUNTER — OFFICE VISIT (OUTPATIENT)
Dept: RHEUMATOLOGY | Facility: CLINIC | Age: 43
End: 2023-05-22
Payer: COMMERCIAL

## 2023-05-22 ENCOUNTER — OFFICE VISIT (OUTPATIENT)
Dept: TRANSPLANT | Facility: CLINIC | Age: 43
End: 2023-05-22
Payer: COMMERCIAL

## 2023-05-22 ENCOUNTER — DOCUMENTATION ONLY (OUTPATIENT)
Dept: TRANSPLANT | Facility: CLINIC | Age: 43
End: 2023-05-22

## 2023-05-22 ENCOUNTER — LAB VISIT (OUTPATIENT)
Dept: LAB | Facility: HOSPITAL | Age: 43
End: 2023-05-22
Attending: INTERNAL MEDICINE
Payer: COMMERCIAL

## 2023-05-22 VITALS
OXYGEN SATURATION: 99 % | HEIGHT: 65 IN | BODY MASS INDEX: 43.05 KG/M2 | SYSTOLIC BLOOD PRESSURE: 120 MMHG | HEART RATE: 98 BPM | WEIGHT: 258.38 LBS | DIASTOLIC BLOOD PRESSURE: 67 MMHG

## 2023-05-22 VITALS
WEIGHT: 256.81 LBS | HEART RATE: 93 BPM | DIASTOLIC BLOOD PRESSURE: 79 MMHG | BODY MASS INDEX: 42.79 KG/M2 | SYSTOLIC BLOOD PRESSURE: 110 MMHG | HEIGHT: 65 IN

## 2023-05-22 DIAGNOSIS — M06.00 SERONEGATIVE RHEUMATOID ARTHRITIS: ICD-10-CM

## 2023-05-22 DIAGNOSIS — M32.13 OTHER SYSTEMIC LUPUS ERYTHEMATOSUS WITH LUNG INVOLVEMENT: ICD-10-CM

## 2023-05-22 DIAGNOSIS — D84.9 IMMUNOSUPPRESSED STATUS: ICD-10-CM

## 2023-05-22 DIAGNOSIS — Z79.899 POLYPHARMACY: ICD-10-CM

## 2023-05-22 DIAGNOSIS — Z79.899 HIGH RISK MEDICATION USE: ICD-10-CM

## 2023-05-22 DIAGNOSIS — R76.8 POSITIVE ANA (ANTINUCLEAR ANTIBODY): ICD-10-CM

## 2023-05-22 DIAGNOSIS — M35.1 MCTD (MIXED CONNECTIVE TISSUE DISEASE): Primary | ICD-10-CM

## 2023-05-22 DIAGNOSIS — J98.4 MIXED RESTRICTIVE AND OBSTRUCTIVE LUNG DISEASE: ICD-10-CM

## 2023-05-22 DIAGNOSIS — J43.9 MIXED RESTRICTIVE AND OBSTRUCTIVE LUNG DISEASE: ICD-10-CM

## 2023-05-22 DIAGNOSIS — J84.112 IDIOPATHIC PULMONARY FIBROSIS: ICD-10-CM

## 2023-05-22 DIAGNOSIS — M35.1 MCTD (MIXED CONNECTIVE TISSUE DISEASE): ICD-10-CM

## 2023-05-22 DIAGNOSIS — I27.9 CHRONIC PULMONARY HEART DISEASE: Primary | ICD-10-CM

## 2023-05-22 DIAGNOSIS — J84.9 ILD (INTERSTITIAL LUNG DISEASE): ICD-10-CM

## 2023-05-22 DIAGNOSIS — D89.89 AUTOIMMUNE DISORDER: ICD-10-CM

## 2023-05-22 DIAGNOSIS — I27.20 PULMONARY HYPERTENSION: ICD-10-CM

## 2023-05-22 DIAGNOSIS — I10 ESSENTIAL HYPERTENSION, BENIGN: ICD-10-CM

## 2023-05-22 DIAGNOSIS — R76.8 ANTI-RNP ANTIBODIES PRESENT: ICD-10-CM

## 2023-05-22 DIAGNOSIS — R06.82 TACHYPNEA: ICD-10-CM

## 2023-05-22 DIAGNOSIS — R06.09 DYSPNEA ON EXERTION: ICD-10-CM

## 2023-05-22 LAB
ALBUMIN SERPL BCP-MCNC: 3.6 G/DL (ref 3.5–5.2)
ALP SERPL-CCNC: 100 U/L (ref 55–135)
ALT SERPL W/O P-5'-P-CCNC: 17 U/L (ref 10–44)
ANION GAP SERPL CALC-SCNC: 13 MMOL/L (ref 8–16)
AST SERPL-CCNC: 23 U/L (ref 10–40)
BASOPHILS # BLD AUTO: 0.03 K/UL (ref 0–0.2)
BASOPHILS NFR BLD: 0.2 % (ref 0–1.9)
BILIRUB SERPL-MCNC: 0.3 MG/DL (ref 0.1–1)
BNP SERPL-MCNC: <10 PG/ML (ref 0–99)
BUN SERPL-MCNC: 14 MG/DL (ref 6–20)
CALCIUM SERPL-MCNC: 9.6 MG/DL (ref 8.7–10.5)
CHLORIDE SERPL-SCNC: 96 MMOL/L (ref 95–110)
CO2 SERPL-SCNC: 29 MMOL/L (ref 23–29)
CREAT SERPL-MCNC: 0.9 MG/DL (ref 0.5–1.4)
CRP SERPL-MCNC: 10.3 MG/L (ref 0–8.2)
DIFFERENTIAL METHOD: ABNORMAL
EOSINOPHIL # BLD AUTO: 0 K/UL (ref 0–0.5)
EOSINOPHIL NFR BLD: 0 % (ref 0–8)
ERYTHROCYTE [DISTWIDTH] IN BLOOD BY AUTOMATED COUNT: 22.2 % (ref 11.5–14.5)
ERYTHROCYTE [SEDIMENTATION RATE] IN BLOOD BY PHOTOMETRIC METHOD: >120 MM/HR (ref 0–36)
EST. GFR  (NO RACE VARIABLE): >60 ML/MIN/1.73 M^2
GLUCOSE SERPL-MCNC: 121 MG/DL (ref 70–110)
HBV CORE AB SERPL QL IA: NORMAL
HBV SURFACE AB SER-ACNC: 91 MIU/ML
HBV SURFACE AB SER-ACNC: REACTIVE M[IU]/ML
HBV SURFACE AG SERPL QL IA: NORMAL
HCT VFR BLD AUTO: 33.4 % (ref 37–48.5)
HCV AB SERPL QL IA: NORMAL
HGB BLD-MCNC: 9.4 G/DL (ref 12–16)
IMM GRANULOCYTES # BLD AUTO: 0.04 K/UL (ref 0–0.04)
IMM GRANULOCYTES NFR BLD AUTO: 0.3 % (ref 0–0.5)
LYMPHOCYTES # BLD AUTO: 1.1 K/UL (ref 1–4.8)
LYMPHOCYTES NFR BLD: 7.2 % (ref 18–48)
MAGNESIUM SERPL-MCNC: 2.1 MG/DL (ref 1.6–2.6)
MCH RBC QN AUTO: 19.5 PG (ref 27–31)
MCHC RBC AUTO-ENTMCNC: 28.1 G/DL (ref 32–36)
MCV RBC AUTO: 69 FL (ref 82–98)
MONOCYTES # BLD AUTO: 0.2 K/UL (ref 0.3–1)
MONOCYTES NFR BLD: 1.1 % (ref 4–15)
NEUTROPHILS # BLD AUTO: 14.4 K/UL (ref 1.8–7.7)
NEUTROPHILS NFR BLD: 91.2 % (ref 38–73)
NRBC BLD-RTO: 0 /100 WBC
PLATELET # BLD AUTO: 561 K/UL (ref 150–450)
PMV BLD AUTO: 9.8 FL (ref 9.2–12.9)
POTASSIUM SERPL-SCNC: 3.5 MMOL/L (ref 3.5–5.1)
PROT SERPL-MCNC: 7.9 G/DL (ref 6–8.4)
RBC # BLD AUTO: 4.83 M/UL (ref 4–5.4)
SODIUM SERPL-SCNC: 138 MMOL/L (ref 136–145)
WBC # BLD AUTO: 15.78 K/UL (ref 3.9–12.7)

## 2023-05-22 PROCEDURE — 3008F BODY MASS INDEX DOCD: CPT | Mod: CPTII,S$GLB,, | Performed by: INTERNAL MEDICINE

## 2023-05-22 PROCEDURE — 1160F PR REVIEW ALL MEDS BY PRESCRIBER/CLIN PHARMACIST DOCUMENTED: ICD-10-PCS | Mod: CPTII,S$GLB,, | Performed by: INTERNAL MEDICINE

## 2023-05-22 PROCEDURE — 83735 ASSAY OF MAGNESIUM: CPT | Performed by: INTERNAL MEDICINE

## 2023-05-22 PROCEDURE — 3074F SYST BP LT 130 MM HG: CPT | Mod: CPTII,S$GLB,, | Performed by: INTERNAL MEDICINE

## 2023-05-22 PROCEDURE — 3074F PR MOST RECENT SYSTOLIC BLOOD PRESSURE < 130 MM HG: ICD-10-PCS | Mod: CPTII,S$GLB,, | Performed by: INTERNAL MEDICINE

## 2023-05-22 PROCEDURE — 86480 TB TEST CELL IMMUN MEASURE: CPT | Performed by: INTERNAL MEDICINE

## 2023-05-22 PROCEDURE — 99215 OFFICE O/P EST HI 40 MIN: CPT | Mod: S$GLB,,, | Performed by: INTERNAL MEDICINE

## 2023-05-22 PROCEDURE — 86803 HEPATITIS C AB TEST: CPT | Performed by: INTERNAL MEDICINE

## 2023-05-22 PROCEDURE — 3044F HG A1C LEVEL LT 7.0%: CPT | Mod: CPTII,S$GLB,, | Performed by: INTERNAL MEDICINE

## 2023-05-22 PROCEDURE — 1159F PR MEDICATION LIST DOCUMENTED IN MEDICAL RECORD: ICD-10-PCS | Mod: CPTII,S$GLB,, | Performed by: INTERNAL MEDICINE

## 2023-05-22 PROCEDURE — 99205 PR OFFICE/OUTPT VISIT, NEW, LEVL V, 60-74 MIN: ICD-10-PCS | Mod: S$GLB,,, | Performed by: INTERNAL MEDICINE

## 2023-05-22 PROCEDURE — 86140 C-REACTIVE PROTEIN: CPT | Performed by: INTERNAL MEDICINE

## 2023-05-22 PROCEDURE — 3078F DIAST BP <80 MM HG: CPT | Mod: CPTII,S$GLB,, | Performed by: INTERNAL MEDICINE

## 2023-05-22 PROCEDURE — 99205 OFFICE O/P NEW HI 60 MIN: CPT | Mod: S$GLB,,, | Performed by: INTERNAL MEDICINE

## 2023-05-22 PROCEDURE — 1160F RVW MEDS BY RX/DR IN RCRD: CPT | Mod: CPTII,S$GLB,, | Performed by: INTERNAL MEDICINE

## 2023-05-22 PROCEDURE — 86225 DNA ANTIBODY NATIVE: CPT | Performed by: INTERNAL MEDICINE

## 2023-05-22 PROCEDURE — 85652 RBC SED RATE AUTOMATED: CPT | Performed by: INTERNAL MEDICINE

## 2023-05-22 PROCEDURE — 86704 HEP B CORE ANTIBODY TOTAL: CPT | Performed by: INTERNAL MEDICINE

## 2023-05-22 PROCEDURE — 83880 ASSAY OF NATRIURETIC PEPTIDE: CPT | Performed by: INTERNAL MEDICINE

## 2023-05-22 PROCEDURE — 99215 PR OFFICE/OUTPT VISIT, EST, LEVL V, 40-54 MIN: ICD-10-PCS | Mod: S$GLB,,, | Performed by: INTERNAL MEDICINE

## 2023-05-22 PROCEDURE — 1159F MED LIST DOCD IN RCRD: CPT | Mod: CPTII,S$GLB,, | Performed by: INTERNAL MEDICINE

## 2023-05-22 PROCEDURE — 80053 COMPREHEN METABOLIC PANEL: CPT | Performed by: INTERNAL MEDICINE

## 2023-05-22 PROCEDURE — 3008F PR BODY MASS INDEX (BMI) DOCUMENTED: ICD-10-PCS | Mod: CPTII,S$GLB,, | Performed by: INTERNAL MEDICINE

## 2023-05-22 PROCEDURE — 3078F PR MOST RECENT DIASTOLIC BLOOD PRESSURE < 80 MM HG: ICD-10-PCS | Mod: CPTII,S$GLB,, | Performed by: INTERNAL MEDICINE

## 2023-05-22 PROCEDURE — 3044F PR MOST RECENT HEMOGLOBIN A1C LEVEL <7.0%: ICD-10-PCS | Mod: CPTII,S$GLB,, | Performed by: INTERNAL MEDICINE

## 2023-05-22 PROCEDURE — 99999 PR PBB SHADOW E&M-EST. PATIENT-LVL V: ICD-10-PCS | Mod: PBBFAC,,, | Performed by: INTERNAL MEDICINE

## 2023-05-22 PROCEDURE — 86235 NUCLEAR ANTIGEN ANTIBODY: CPT | Mod: 59 | Performed by: INTERNAL MEDICINE

## 2023-05-22 PROCEDURE — 36415 COLL VENOUS BLD VENIPUNCTURE: CPT | Performed by: INTERNAL MEDICINE

## 2023-05-22 PROCEDURE — 86039 ANTINUCLEAR ANTIBODIES (ANA): CPT | Performed by: INTERNAL MEDICINE

## 2023-05-22 PROCEDURE — 86706 HEP B SURFACE ANTIBODY: CPT | Mod: 91 | Performed by: INTERNAL MEDICINE

## 2023-05-22 PROCEDURE — 87340 HEPATITIS B SURFACE AG IA: CPT | Performed by: INTERNAL MEDICINE

## 2023-05-22 PROCEDURE — 85025 COMPLETE CBC W/AUTO DIFF WBC: CPT | Performed by: INTERNAL MEDICINE

## 2023-05-22 PROCEDURE — 99999 PR PBB SHADOW E&M-EST. PATIENT-LVL V: CPT | Mod: PBBFAC,,, | Performed by: INTERNAL MEDICINE

## 2023-05-22 PROCEDURE — 86038 ANTINUCLEAR ANTIBODIES: CPT | Performed by: INTERNAL MEDICINE

## 2023-05-22 NOTE — PROGRESS NOTES
Subjective:   Patient ID:  Daija Luna is a 43 y.o. female who presents for evaluation of possible PH.    42 YO F w/ PMH of MCTD, SLE, ILD, HTN, pre-DM, obesity, hypothyroidism, TIA in 2018, ADHD on Adderal who presents for evaluation of possible PH.    She sees Dr. Kain Mcclain MD as her primary cardiologist. In addition she sees pulmonology and rheumatology for her ILD and connective tissue disorders which include MCTD and SLE.    Her diagnosis of lupus, mixed connective tissue disease, and the associated interstitial lung disease all day back approximately 4 years.  Since she will diagnosis, in addition to the connective tissue disease symptoms that she experienced including cutaneous manifestations and rheumatoid arthritis, she has been having progressively worsening shortness of breath.  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  From a rheumatoid standpoint she has been feel significant arthritis or connective tissue disease symptoms.  But she has persistent shortness of breath and chest discomfort.  She says that on level ground she can walk from the parking lot to the inside of the hospital pause because of significant shortness of breath.  Occasionally when she exerts herself she notices that she drops her oxygen saturation to the 70s when she measures it.  Of note, she says that she is been losing weight since initiation of Ozempic, and says that the shortness of breath has also gotten better with this.  She notes occasional episodes of palpitations, and intermittent leg swelling which is better with her diuretics.  She notes occasional episodes of exertional presyncope but has not had boaz syncopal episodes.  Also has lightheadedness with cough.  His any PND or orthopnea.    Family history of pulmonary hypertension, cardiac or pulmonary issues, or history of connective tissue disease.    No  history of tobacco use.  Drinks alcohol socially.  No history of recreational drug use in the past.  She says that she did take phentermine approximately 5 years prior for weight loss, with her regimen being alternating months of therapy for approximately 1 year.  No history of exposure to asbestos to the best of her knowledge.    CT Chest High Res 3/30/22 (Outside)  IMPRESSION:    Significant interval improvement of patchy groundglass opacities   throughout both lungs, consistent with improving multifocal   infectious/inflammatory process.   Peripheral reticulation, fibrosis, and bronchiectasis in a peripheral and   basilar predominant fashion are most concerning for usual interstitial   pneumonia.    TTE 5/3/23  The left ventricle is normal in size with concentric hypertrophy and normal systolic function.  Normal left ventricular diastolic function.  The estimated PA systolic pressure is 49 mmHg.  Normal right ventricular size with normal right ventricular systolic function.  There is pulmonary hypertension.  Normal central venous pressure (3 mmHg).  Moderate mitral regurgitation.  Moderate tricuspid regurgitation.  The estimated ejection fraction is 65%.    Review of Systems   Constitutional: Positive for malaise/fatigue. Negative for chills and fever.   HENT:  Negative for hearing loss.    Eyes:  Negative for visual disturbance.   Cardiovascular:  Positive for dyspnea on exertion and leg swelling. Negative for chest pain, irregular heartbeat, orthopnea, palpitations, paroxysmal nocturnal dyspnea and syncope.   Respiratory:  Positive for cough and shortness of breath.    Musculoskeletal:  Negative for muscle weakness.   Gastrointestinal:  Negative for diarrhea, nausea and vomiting.   Neurological:  Negative for focal weakness.   Psychiatric/Behavioral:  Negative for memory loss.      Objective:     Vitals:    05/22/23 1309   BP: 120/67   Pulse: 98     Physical Exam  Constitutional:       Appearance: She is obese.    HENT:      Head: Atraumatic.   Eyes:      Extraocular Movements: Extraocular movements intact.   Cardiovascular:      Rate and Rhythm: Normal rate and regular rhythm.      Pulses: Normal pulses.      Heart sounds: Normal heart sounds.      Comments: JVP just above clavicle at 45 degrees.  Pulmonary:      Comments: Bilateral dry crackles heard diffusely, most prominent in lower lung fields.  Abdominal:      Palpations: Abdomen is soft.      Tenderness: There is no abdominal tenderness.   Musculoskeletal:         General: Normal range of motion.      Right lower leg: No edema.      Left lower leg: No edema.   Neurological:      General: No focal deficit present.      Mental Status: She is alert and oriented to person, place, and time.       Assessment:      1. Chronic pulmonary heart disease    2. Pulmonary hypertension    3. ILD (interstitial lung disease)    4. Mixed restrictive and obstructive lung disease    5. Idiopathic pulmonary fibrosis    6. Essential hypertension, benign    7. Dyspnea on exertion    8. Autoimmune disorder    9. Positive ANDERSON (antinuclear antibody)    10. Other systemic lupus erythematosus with lung involvement    11. Anti-RNP antibodies present    12. Immunosuppressed status    13. MCTD (mixed connective tissue disease)        Plan:     Pulmonary hypertension  MCTD, SLE  ILD  ADHD on Adderal  - presents for evaluation of possible pulmonary hypertension as noted on her echocardiogram.  She has symptoms including exertional dyspnea and intermittent leg swelling requiring Bumex.  - differential diagnosis of pulmonary hypertension includes who group 3 related to his interstitial lung disease versus group 1 related to her connective tissue disease. I suspect that group 3 is more likely based on the findings on her prior CT scans.  - we will get pulmonary function testing, and high-resolution CT of her chest.  We will also get right heart catheterization.  - if findings are consistent with severe  ILD and associated pulmonary hypertension, she could be a candidate for Tyvaso for therapy.  In the event that the pulmonary hypertension is out of proportion to her interstitial lung disease, then we will consider therapy for group 1 pulmonary hypertension vasodilators.  - If PH is noted, will discuss recommending alternative therapies for ADHD other than Adderal as there might be some contribution from this.  - right heart catheterization procedure was explained to her, after discussing risks/benefits she is amenable to proceed.  Consent was signed in clinic.    RTC after right heart catheterization, high-resolution CT chest, pulmonary function testing, and lab/6 minute walk which was rescheduled from today.

## 2023-05-22 NOTE — PROGRESS NOTES
RHEUMATOLOGY FOLLOW UP  VISIT    Chief complaints, HPI, ROS, EXAM, Assessment & Plans:-  Daija Garcia a 43 y.o. pleasant female seen today for follow-up visit.  She has a complex history of mixed connective tissue disease with interstitial lung disease and lupus arthritis.  She is on Plaquenil for arthritis.  She is on Ofev for interstitial lung disease and received rituximab 07/2021.  Failed CellCept.  Intolerance to Prograf, imuran and Cytoxan oral therapy.  She is here to reestablish care.  Recently diagnosed with pulmonary artery hypertension.  Scheduled to consult pulmonary hypertension specialist later today in [place].  No right heart catheterization yet.  Complains of persistent worsening shortness of breath.  Denies any joint flare. She still on prednisone 10 mg daily.  Rheumatological review of system negative otherwise.  No synovitis on exam.  1. MCTD (mixed connective tissue disease)    2. ILD (interstitial lung disease)    3. Immunosuppressed status    4. High risk medication use    5. Seronegative rheumatoid arthritis    6. Pulmonary hypertension      Problem List Items Addressed This Visit       ILD (interstitial lung disease)    Relevant Orders    CT Chest Without Contrast    ANDERSON Screen w/Reflex    Hepatitis B Core Antibody, Total    Hepatitis B Surface Ab, Qualitative    Hepatitis B Surface Antigen    Hepatitis C Antibody    Quantiferon Gold TB    Sedimentation rate    C-Reactive Protein    High risk medication use    Relevant Orders    ANDERSON Screen w/Reflex    Hepatitis B Core Antibody, Total    Hepatitis B Surface Ab, Qualitative    Hepatitis B Surface Antigen    Hepatitis C Antibody    Quantiferon Gold TB    Sedimentation rate    C-Reactive Protein    Immunosuppressed status    Relevant Orders    ANDERSON Screen w/Reflex    Hepatitis B Core Antibody, Total    Hepatitis B Surface Ab, Qualitative    Hepatitis B Surface Antigen    Hepatitis C Antibody    Quantiferon Gold TB    Sedimentation  rate    C-Reactive Protein    MCTD (mixed connective tissue disease) - Primary    Relevant Orders    CT Chest Without Contrast    ANDERSON Screen w/Reflex    Hepatitis B Core Antibody, Total    Hepatitis B Surface Ab, Qualitative    Hepatitis B Surface Antigen    Hepatitis C Antibody    Quantiferon Gold TB    Sedimentation rate    C-Reactive Protein    Pulmonary hypertension     Other Visit Diagnoses       Seronegative rheumatoid arthritis        Relevant Orders    CT Chest Without Contrast    ANDERSON Screen w/Reflex    Hepatitis B Core Antibody, Total    Hepatitis B Surface Ab, Qualitative    Hepatitis B Surface Antigen    Hepatitis C Antibody    Quantiferon Gold TB    Sedimentation rate    C-Reactive Protein            Worsening shortness of breath with CTD related interstitial lung disease and pulmonary artery hypertension.  Will need right heart catheterization to confirm pulmonary artery hypertension.  Repeat CT chest to monitor interstitial lung disease on Ofev.  Based on the results of CT chest, repeat rituximab infusion.  Continue Plaquenil for arthritis.    Consider Imuran.  Normal TPMT enzyme activity.  Compromised immune system secondary to autoimmune disease and use of immunosuppressive drugs. Monitor carefully for infections. Advised to get immediate medical care if any infection. Also advised strict adherence to age appropriate vaccinations and cancer screenings with PCP.     # Follow up in about 3 months (around 8/22/2023).      Past Medical History:   Diagnosis Date    ADHD (attention deficit hyperactivity disorder)     Asthma     Hypertension     Hypothyroidism     Mixed restrictive and obstructive lung disease     Pulmonary hypertension     Rheumatoid arthritis flare 06/22/2021    TIA (transient ischemic attack)        Past Surgical History:   Procedure Laterality Date    BRONCHOSCOPY Bilateral 8/16/2019    Procedure: Bronchoscopy;  Surgeon: Virgilio Weiss MD;  Location: [facility];  Service: Endoscopy;   Laterality: Bilateral;        Social History     Tobacco Use    Smoking status: Never    Smokeless tobacco: Never   Substance Use Topics    Alcohol use: Not Currently     Comment: social    Drug use: No       Family History   Problem Relation Age of Onset    Cancer Maternal Aunt         Breast, Back, Lung Cancer    Cancer Father        Review of patient's allergies indicates:   Allergen Reactions    Ceftriaxone Swelling     Patient states that BP spike when taken rocephin.     Citrus and derivatives     Codeine Swelling       Medication List with Changes/Refills   Current Medications    ALBUTEROL 90 MCG/ACTUATION INHALER    Inhale 2 puffs into the lungs every 6 (six) hours as needed for Wheezing.    ALBUTEROL-IPRATROPIUM (DUO-NEB) 2.5 MG-0.5 MG/3 ML NEBULIZER SOLUTION    TAKE 3 MLS BY NEBULIZATION EVERY 6 (SIX) HOURS AS NEEDED FOR WHEEZING. RESCUE    BENZONATATE (TESSALON) 200 MG CAPSULE    Take 200 mg by mouth 3 (three) times daily as needed.    BUMETANIDE (BUMEX) 2 MG TABLET    Take 2 mg by mouth 2 (two) times daily.    DEXTROAMPHETAMINE-AMPHETAMINE (ADDERALL) 15 MG TABLET        DULERA 200-5 MCG/ACTUATION INHALER    TAKE 2 PUFFS BY MOUTH TWICE A DAY    ERGOCALCIFEROL (ERGOCALCIFEROL) 50,000 UNIT CAP    Take 50,000 Units by mouth every 7 days.    EZETIMIBE (ZETIA) 10 MG TABLET    Take 1 tablet (10 mg total) by mouth once daily.    FLUTICASONE (FLONASE) 50 MCG/ACTUATION NASAL SPRAY    2 sprays (100 mcg total) by Each Nare route once daily.    FOLIC ACID (FOLVITE) 1 MG TABLET    Take 1,000 mcg by mouth once daily.    HYDROXYCHLOROQUINE (PLAQUENIL) 200 MG TABLET    Take 1 tablet (200 mg total) by mouth 2 (two) times daily.    MAGNESIUM OXIDE (MAG-OX) 400 MG (241.3 MG MAGNESIUM) TABLET    Take 1 tablet (400 mg total) by mouth once daily.    MONTELUKAST (SINGULAIR) 10 MG TABLET    SMARTSI Tablet(s) By Mouth Every Evening    NINTEDANIB (OFEV) 150 MG CAP    Take 1 capsule (150 mg total) by mouth every 12 (twelve)  hours.    POTASSIUM CHLORIDE SA (K-DUR,KLOR-CON) 20 MEQ TABLET    Take 1 tablet (20 mEq total) by mouth once daily.    PREDNISONE (DELTASONE) 10 MG TABLET    TAKE 1 TABLET BY MOUTH EVERY DAY    PROMETHAZINE (PHENERGAN) 6.25 MG/5 ML SYRUP    SMARTSI.5-10 Milliliter(s) By Mouth 3 Times Daily    QUETIAPINE (SEROQUEL) 50 MG TABLET    Take 50 mg by mouth every evening.    SEMAGLUTIDE (OZEMPIC) 0.25 MG OR 0.5 MG(2 MG/1.5 ML) PEN INJECTOR    Inject 0.25 mg into the skin every 7 days.    SULFAMETHOXAZOLE-TRIMETHOPRIM 800-160MG (BACTRIM DS) 800-160 MG TAB    TAKE 1 TABLET BY MOUTH THREE TIMES WEEKLY    VILAZODONE (VIIBRYD) 10 MG (7)- 20 MG (23) DSPK       Discontinued Medications    PRAVASTATIN (PRAVACHOL) 20 MG TABLET    Take 1 tablet (20 mg total) by mouth every evening.       Disclaimer: This note was prepared using voice recognition system and is likely to have sound alike errors and is not proof read.  Please call me with any questions.

## 2023-05-22 NOTE — PROGRESS NOTES
Spoke with patient about PH, pathophysiology of PH, different testing that is being done, and the role of Specialty Pharmacy. Once testing is completed and results are review by the physician than a decision will be made on treatment course. Briefly discussed oral, parenteral and inhaled medications as they pertain to pulmonary hypertension. All questions answered and patient verbalized understanding.     Patient was given contact information to nurse coordinators in case she does have questions later on.   Gave information about the PHA website, ph emergency plan, go/stop medication information sheet, and [facility] PH pamphlet.

## 2023-05-23 ENCOUNTER — TELEPHONE (OUTPATIENT)
Dept: INFUSION THERAPY | Facility: HOSPITAL | Age: 43
End: 2023-05-23
Payer: COMMERCIAL

## 2023-05-23 LAB
ANA PATTERN 1: NORMAL
ANA SER QL IF: POSITIVE
ANA TITR SER IF: NORMAL {TITER}
GAMMA INTERFERON BACKGROUND BLD IA-ACNC: 0.02 IU/ML
M TB IFN-G CD4+ BCKGRND COR BLD-ACNC: 0.01 IU/ML
MITOGEN IGNF BCKGRD COR BLD-ACNC: 6.3 IU/ML
TB GOLD PLUS: NEGATIVE
TB2 - NIL: 0.05 IU/ML

## 2023-05-23 RX ORDER — METHYLPREDNISOLONE SOD SUCC 125 MG
100 VIAL (EA) INJECTION
Status: CANCELLED
Start: 2023-05-23

## 2023-05-23 RX ORDER — ACETAMINOPHEN 325 MG/1
650 TABLET ORAL
Status: CANCELLED | OUTPATIENT
Start: 2023-05-23

## 2023-05-23 RX ORDER — FAMOTIDINE 10 MG/ML
20 INJECTION INTRAVENOUS
Status: CANCELLED | OUTPATIENT
Start: 2023-05-23

## 2023-05-23 RX ORDER — DIPHENHYDRAMINE HYDROCHLORIDE 50 MG/ML
25 INJECTION INTRAMUSCULAR; INTRAVENOUS
Status: CANCELLED
Start: 2023-05-23

## 2023-05-23 RX ORDER — HEPARIN 100 UNIT/ML
500 SYRINGE INTRAVENOUS
Status: CANCELLED | OUTPATIENT
Start: 2023-05-23

## 2023-05-23 RX ORDER — SODIUM CHLORIDE 0.9 % (FLUSH) 0.9 %
10 SYRINGE (ML) INJECTION
Status: CANCELLED | OUTPATIENT
Start: 2023-05-23

## 2023-05-23 NOTE — TELEPHONE ENCOUNTER
Discussed next Rituxan infusion protocol. Last cycle was 7/20/21 & 8/3/21   Next cycle scheduled for 6/9 & 6/23/23 pending insurance approval. Verbalized understanding.

## 2023-05-23 NOTE — TELEPHONE ENCOUNTER
----- Message from Bob Willis MD sent at 5/23/2023  6:41 AM CDT -----  Sedimentation rate is significantly elevated compared to prior values. We should restart Rituximab sooner than expected. I am reaching out to our infusion team to start approval process and get you back on Rituximab asap.

## 2023-05-25 ENCOUNTER — HOSPITAL ENCOUNTER (OUTPATIENT)
Dept: SLEEP MEDICINE | Facility: HOSPITAL | Age: 43
Discharge: HOME | End: 2023-05-25
Attending: HOSPITALIST
Payer: COMMERCIAL

## 2023-05-25 ENCOUNTER — PATIENT MESSAGE (OUTPATIENT)
Dept: CARDIOLOGY | Facility: CLINIC | Age: 43
End: 2023-05-25
Payer: COMMERCIAL

## 2023-05-25 DIAGNOSIS — G47.30 SLEEP-DISORDERED BREATHING: ICD-10-CM

## 2023-05-25 LAB
ANTI SM ANTIBODY: 0.21 RATIO (ref 0–0.99)
ANTI SM/RNP ANTIBODY: 0.4 RATIO (ref 0–0.99)
ANTI-SM INTERPRETATION: NEGATIVE
ANTI-SM/RNP INTERPRETATION: NEGATIVE
ANTI-SSA ANTIBODY: 0.29 RATIO (ref 0–0.99)
ANTI-SSA INTERPRETATION: NEGATIVE
ANTI-SSB ANTIBODY: 0.06 RATIO (ref 0–0.99)
ANTI-SSB INTERPRETATION: NEGATIVE
DSDNA AB SER-ACNC: NORMAL [IU]/ML
OHS CV HOLTER SINUS AVERAGE HR: 98
OHS CV HOLTER SINUS MAX HR: 152
OHS CV HOLTER SINUS MIN HR: 73

## 2023-05-25 PROCEDURE — 95806 SLEEP STUDY UNATT&RESP EFFT: CPT | Performed by: INTERNAL MEDICINE

## 2023-05-25 RX ORDER — SEMAGLUTIDE 1.34 MG/ML
0.5 INJECTION, SOLUTION SUBCUTANEOUS
Qty: 3 ML | Refills: 1 | Status: SHIPPED | OUTPATIENT
Start: 2023-05-25 | End: 2024-03-14

## 2023-05-25 NOTE — Clinical Note
One night study AHI was 4.6/hr with 27 events Snoring was 100% SpO2 kayla was 77% SpO2 was below 90% saturation for 485 of study Significant hypoxemia Consider inlab POLYSOMNOGRAPHY PRIMARY SNORING

## 2023-05-26 PROCEDURE — 95806 SLEEP STUDY UNATT&RESP EFFT: CPT | Mod: 26,52,, | Performed by: INTERNAL MEDICINE

## 2023-05-26 PROCEDURE — 95806 PR SLEEP STUDY, UNATTENDED, SIMUL RECORD HR/O2 SAT/RESP FLOW/RESP EFFT: ICD-10-PCS | Mod: 26,52,, | Performed by: INTERNAL MEDICINE

## 2023-05-26 NOTE — PROCEDURES
"One night study  AHI was 4.6/hr with 27 events  Snoring was 100%  SpO2 kayla was 77%  SpO2 was below 90% saturation for 485 of study  Significant hypoxemia  Consider inlab POLYSOMNOGRAPHY  PRIMARY SNORING      Dear Mary Nguyễn PA-C  [address] [place] [address]/O Cabrera Rothman Jr, NP         The sleep study that you ordered is complete.  You have ordered sleep LAB services to perform the sleep study for Daija Luna .      Please find Sleep Study result in  the "Media tab" of Chart Review menu.        You can look  for the report in the  Media by the document type "Sleep Study Documents". Alphabetizing  "Document type" column helps to find the SLEEP STUDY report  Faster.       As the ordering provider, you are responsible for reviewing the results and implementing a treatment plan with your patient.    If you need a Sleep Medicine provider to explain the sleep study findings and arrange treatment for the patient, please refer patient for consultation to our Sleep Clinic via [facility] with Ambulatory Consult Sleep.     To do that please place an order for an  "Ambulatory Consult Sleep" -  order , it will go to our clinic work queue for our staff  to contact the patient for an appointment.      For any questions, please contact our sleep lab  staff at [phone] to talk to clinical staff          Virgilio Weiss MD   "

## 2023-05-29 DIAGNOSIS — G47.30 SLEEP-DISORDERED BREATHING: Primary | ICD-10-CM

## 2023-05-30 DIAGNOSIS — Z79.899 HIGH RISK MEDICATION USE: Primary | ICD-10-CM

## 2023-06-07 ENCOUNTER — PATIENT MESSAGE (OUTPATIENT)
Dept: INFUSION THERAPY | Facility: HOSPITAL | Age: 43
End: 2023-06-07
Payer: COMMERCIAL

## 2023-06-12 ENCOUNTER — PATIENT MESSAGE (OUTPATIENT)
Dept: RHEUMATOLOGY | Facility: CLINIC | Age: 43
End: 2023-06-12
Payer: COMMERCIAL

## 2023-06-15 ENCOUNTER — PATIENT MESSAGE (OUTPATIENT)
Dept: CARDIOLOGY | Facility: CLINIC | Age: 43
End: 2023-06-15
Payer: COMMERCIAL

## 2023-06-19 ENCOUNTER — LAB VISIT (OUTPATIENT)
Dept: LAB | Facility: HOSPITAL | Age: 43
End: 2023-06-19
Attending: INTERNAL MEDICINE
Payer: COMMERCIAL

## 2023-06-19 ENCOUNTER — INFUSION (OUTPATIENT)
Dept: INFUSION THERAPY | Facility: HOSPITAL | Age: 43
End: 2023-06-19
Attending: INTERNAL MEDICINE
Payer: COMMERCIAL

## 2023-06-19 VITALS
TEMPERATURE: 98 F | HEIGHT: 65 IN | WEIGHT: 252.44 LBS | BODY MASS INDEX: 42.06 KG/M2 | DIASTOLIC BLOOD PRESSURE: 76 MMHG | HEART RATE: 96 BPM | OXYGEN SATURATION: 94 % | SYSTOLIC BLOOD PRESSURE: 115 MMHG | RESPIRATION RATE: 18 BRPM

## 2023-06-19 DIAGNOSIS — M06.9 RHEUMATOID ARTHRITIS FLARE: Primary | ICD-10-CM

## 2023-06-19 DIAGNOSIS — Z79.899 HIGH RISK MEDICATION USE: ICD-10-CM

## 2023-06-19 DIAGNOSIS — M32.13 OTHER SYSTEMIC LUPUS ERYTHEMATOSUS WITH LUNG INVOLVEMENT: ICD-10-CM

## 2023-06-19 DIAGNOSIS — J84.9 ILD (INTERSTITIAL LUNG DISEASE): ICD-10-CM

## 2023-06-19 LAB
ALBUMIN SERPL BCP-MCNC: 3.4 G/DL (ref 3.5–5.2)
ALP SERPL-CCNC: 82 U/L (ref 55–135)
ALT SERPL W/O P-5'-P-CCNC: 20 U/L (ref 10–44)
ANION GAP SERPL CALC-SCNC: 13 MMOL/L (ref 8–16)
ANISOCYTOSIS BLD QL SMEAR: SLIGHT
AST SERPL-CCNC: 25 U/L (ref 10–40)
B-HCG UR QL: NEGATIVE
BASOPHILS # BLD AUTO: 0.08 K/UL (ref 0–0.2)
BASOPHILS NFR BLD: 0.6 % (ref 0–1.9)
BILIRUB SERPL-MCNC: 0.4 MG/DL (ref 0.1–1)
BUN SERPL-MCNC: 12 MG/DL (ref 6–20)
CALCIUM SERPL-MCNC: 9.3 MG/DL (ref 8.7–10.5)
CHLORIDE SERPL-SCNC: 99 MMOL/L (ref 95–110)
CO2 SERPL-SCNC: 26 MMOL/L (ref 23–29)
CREAT SERPL-MCNC: 1.1 MG/DL (ref 0.5–1.4)
DIFFERENTIAL METHOD: ABNORMAL
EOSINOPHIL # BLD AUTO: 0.3 K/UL (ref 0–0.5)
EOSINOPHIL NFR BLD: 2.2 % (ref 0–8)
ERYTHROCYTE [DISTWIDTH] IN BLOOD BY AUTOMATED COUNT: 23.3 % (ref 11.5–14.5)
EST. GFR  (NO RACE VARIABLE): >60 ML/MIN/1.73 M^2
GLUCOSE SERPL-MCNC: 106 MG/DL (ref 70–110)
HCT VFR BLD AUTO: 33.8 % (ref 37–48.5)
HGB BLD-MCNC: 9.9 G/DL (ref 12–16)
HYPOCHROMIA BLD QL SMEAR: ABNORMAL
IMM GRANULOCYTES # BLD AUTO: 0.06 K/UL (ref 0–0.04)
IMM GRANULOCYTES NFR BLD AUTO: 0.4 % (ref 0–0.5)
LYMPHOCYTES # BLD AUTO: 2.1 K/UL (ref 1–4.8)
LYMPHOCYTES NFR BLD: 14.8 % (ref 18–48)
MCH RBC QN AUTO: 20.2 PG (ref 27–31)
MCHC RBC AUTO-ENTMCNC: 29.3 G/DL (ref 32–36)
MCV RBC AUTO: 69 FL (ref 82–98)
MONOCYTES # BLD AUTO: 0.6 K/UL (ref 0.3–1)
MONOCYTES NFR BLD: 4.3 % (ref 4–15)
NEUTROPHILS # BLD AUTO: 10.9 K/UL (ref 1.8–7.7)
NEUTROPHILS NFR BLD: 77.7 % (ref 38–73)
NRBC BLD-RTO: 0 /100 WBC
OVALOCYTES BLD QL SMEAR: ABNORMAL
PLATELET # BLD AUTO: 473 K/UL (ref 150–450)
PLATELET BLD QL SMEAR: ABNORMAL
PMV BLD AUTO: 9.8 FL (ref 9.2–12.9)
POIKILOCYTOSIS BLD QL SMEAR: SLIGHT
POLYCHROMASIA BLD QL SMEAR: ABNORMAL
POTASSIUM SERPL-SCNC: 3 MMOL/L (ref 3.5–5.1)
PROT SERPL-MCNC: 7.7 G/DL (ref 6–8.4)
RBC # BLD AUTO: 4.91 M/UL (ref 4–5.4)
SMUDGE CELLS BLD QL SMEAR: PRESENT
SODIUM SERPL-SCNC: 138 MMOL/L (ref 136–145)
WBC # BLD AUTO: 13.98 K/UL (ref 3.9–12.7)

## 2023-06-19 PROCEDURE — 81025 URINE PREGNANCY TEST: CPT | Performed by: INTERNAL MEDICINE

## 2023-06-19 PROCEDURE — 25000003 PHARM REV CODE 250: Performed by: INTERNAL MEDICINE

## 2023-06-19 PROCEDURE — 96413 CHEMO IV INFUSION 1 HR: CPT

## 2023-06-19 PROCEDURE — 96375 TX/PRO/DX INJ NEW DRUG ADDON: CPT

## 2023-06-19 PROCEDURE — 36415 COLL VENOUS BLD VENIPUNCTURE: CPT | Performed by: INTERNAL MEDICINE

## 2023-06-19 PROCEDURE — 96415 CHEMO IV INFUSION ADDL HR: CPT

## 2023-06-19 PROCEDURE — 85025 COMPLETE CBC W/AUTO DIFF WBC: CPT | Performed by: INTERNAL MEDICINE

## 2023-06-19 PROCEDURE — 80053 COMPREHEN METABOLIC PANEL: CPT | Performed by: INTERNAL MEDICINE

## 2023-06-19 PROCEDURE — 63600175 PHARM REV CODE 636 W HCPCS: Performed by: INTERNAL MEDICINE

## 2023-06-19 RX ORDER — ACETAMINOPHEN 325 MG/1
650 TABLET ORAL
Status: COMPLETED | OUTPATIENT
Start: 2023-06-19 | End: 2023-06-19

## 2023-06-19 RX ORDER — FAMOTIDINE 10 MG/ML
20 INJECTION INTRAVENOUS
Status: COMPLETED | OUTPATIENT
Start: 2023-06-19 | End: 2023-06-19

## 2023-06-19 RX ORDER — METHYLPREDNISOLONE SOD SUCC 125 MG
100 VIAL (EA) INJECTION
Status: CANCELLED
Start: 2023-06-20

## 2023-06-19 RX ORDER — METHYLPREDNISOLONE SOD SUCC 125 MG
100 VIAL (EA) INJECTION
Status: COMPLETED | OUTPATIENT
Start: 2023-06-19 | End: 2023-06-19

## 2023-06-19 RX ORDER — DIPHENHYDRAMINE HYDROCHLORIDE 50 MG/ML
25 INJECTION INTRAMUSCULAR; INTRAVENOUS
Status: CANCELLED
Start: 2023-06-20

## 2023-06-19 RX ORDER — FAMOTIDINE 10 MG/ML
20 INJECTION INTRAVENOUS
Status: CANCELLED | OUTPATIENT
Start: 2023-06-20

## 2023-06-19 RX ORDER — ACETAMINOPHEN 325 MG/1
650 TABLET ORAL
Status: CANCELLED | OUTPATIENT
Start: 2023-06-20

## 2023-06-19 RX ORDER — DIPHENHYDRAMINE HYDROCHLORIDE 50 MG/ML
25 INJECTION INTRAMUSCULAR; INTRAVENOUS
Status: COMPLETED | OUTPATIENT
Start: 2023-06-19 | End: 2023-06-19

## 2023-06-19 RX ORDER — HEPARIN 100 UNIT/ML
500 SYRINGE INTRAVENOUS
Status: CANCELLED | OUTPATIENT
Start: 2023-06-20

## 2023-06-19 RX ORDER — SODIUM CHLORIDE 0.9 % (FLUSH) 0.9 %
10 SYRINGE (ML) INJECTION
Status: CANCELLED | OUTPATIENT
Start: 2023-06-20

## 2023-06-19 RX ADMIN — SODIUM CHLORIDE: 9 INJECTION, SOLUTION INTRAVENOUS at 09:06

## 2023-06-19 RX ADMIN — ACETAMINOPHEN 650 MG: 325 TABLET ORAL at 09:06

## 2023-06-19 RX ADMIN — RITUXIMAB 1000 MG: 10 INJECTION, SOLUTION INTRAVENOUS at 09:06

## 2023-06-19 RX ADMIN — METHYLPREDNISOLONE SODIUM SUCCINATE 100 MG: 125 INJECTION, POWDER, FOR SOLUTION INTRAMUSCULAR; INTRAVENOUS at 09:06

## 2023-06-19 RX ADMIN — FAMOTIDINE 20 MG: 10 INJECTION INTRAVENOUS at 09:06

## 2023-06-19 RX ADMIN — DIPHENHYDRAMINE HYDROCHLORIDE 25 MG: 50 INJECTION INTRAMUSCULAR; INTRAVENOUS at 09:06

## 2023-06-19 NOTE — NURSING
Infusion # Rituxan #1 - 1,000 mg     Any:  -recent illness, infection, or antibiotic use in past week- denies  -open wounds or mouth sores- denies  -invasive procedures or surgeries in past 4 weeks or in upcoming 4 weeks- denies  -vaccinations in past week- denies  -any new symptoms/change in symptoms-denies  -chance you may be pregnant- denies      Recent labs? 6/19/2023  Last Rheumatology provider visit- Seen by Dr. Wlilis  on 5/22/2023     Premeds-Solumedrol IVP; Tylenol PO; Benadryl IVP; Pepcid IVP     Rituxan 1,000 mg administered IV at a Rate of 50ml/hr every 30 minutes till reach max rate of 400ml/hr per orders; see MAR and vitals for more details. Tolerated well without adverse events, discharged and ambulatory out of clinic.

## 2023-06-19 NOTE — PLAN OF CARE
Problem: Adult Inpatient Plan of Care  Goal: Plan of Care Review  Outcome: Ongoing, Progressing  Flowsheets (Taken 6/19/2023 1049)  Plan of Care Reviewed With: patient  Goal: Patient-Specific Goal (Individualized)  Outcome: Ongoing, Progressing  Flowsheets (Taken 6/19/2023 1049)  Anxieties, Fears or Concerns: denies  Individualized Care Needs:   Reclined in chair with feet elevated   warm blanket   cup of ice/oj/snack provided  Goal: Optimal Comfort and Wellbeing  Outcome: Ongoing, Progressing  Intervention: Monitor Pain and Promote Comfort  Flowsheets (Taken 6/19/2023 1049)  Pain Management Interventions:   quiet environment facilitated   warm blanket provided   relaxation techniques promoted  Intervention: Provide Person-Centered Care  Flowsheets (Taken 6/19/2023 1049)  Trust Relationship/Rapport:   care explained   questions encouraged   choices provided   reassurance provided   emotional support provided   thoughts/feelings acknowledged   empathic listening provided   questions answered     Problem: Infection  Goal: Absence of Infection Signs and Symptoms  Outcome: Ongoing, Progressing  Intervention: Prevent or Manage Infection  Flowsheets (Taken 6/19/2023 1049)  Infection Management: aseptic technique maintained  Isolation Precautions: contact

## 2023-06-19 NOTE — PLAN OF CARE
Infusion # Rituxan #1 - 1,000 mg     Any:  -recent illness, infection, or antibiotic use in past week- denies  -open wounds or mouth sores- denies  -invasive procedures or surgeries in past 4 weeks or in upcoming 4 weeks- denies  -vaccinations in past week- denies  -any new symptoms/change in symptoms-denies  -chance you may be pregnant- denies      Recent labs? 6/19/2023  Last Rheumatology provider visit- Seen by Dr. Willis  on 5/22/2023     Premeds-Solumedrol IVP; Tylenol PO; Benadryl IVP; Pepcid IVP     Rituxan 1,000 mg administered IV at a Rate of 50ml/hr every 30 minutes till reach max rate of 400ml/hr per orders; see MAR and vitals for more details. Tolerated well without adverse events, discharged and ambulatory out of clinic.

## 2023-06-25 ENCOUNTER — PATIENT MESSAGE (OUTPATIENT)
Dept: RHEUMATOLOGY | Facility: CLINIC | Age: 43
End: 2023-06-25
Payer: COMMERCIAL

## 2023-07-03 ENCOUNTER — PATIENT MESSAGE (OUTPATIENT)
Dept: INFUSION THERAPY | Facility: HOSPITAL | Age: 43
End: 2023-07-03

## 2023-07-03 ENCOUNTER — TELEPHONE (OUTPATIENT)
Dept: INFUSION THERAPY | Facility: HOSPITAL | Age: 43
End: 2023-07-03
Payer: COMMERCIAL

## 2023-07-05 ENCOUNTER — HOSPITAL ENCOUNTER (OUTPATIENT)
Dept: PULMONOLOGY | Facility: CLINIC | Age: 43
Discharge: HOME | End: 2023-07-05
Payer: COMMERCIAL

## 2023-07-05 ENCOUNTER — HOSPITAL ENCOUNTER (OUTPATIENT)
Facility: HOSPITAL | Age: 43
Discharge: HOME | End: 2023-07-05
Attending: INTERNAL MEDICINE | Admitting: INTERNAL MEDICINE
Payer: COMMERCIAL

## 2023-07-05 VITALS
TEMPERATURE: 98 F | HEIGHT: 65 IN | DIASTOLIC BLOOD PRESSURE: 85 MMHG | HEART RATE: 108 BPM | OXYGEN SATURATION: 96 % | RESPIRATION RATE: 18 BRPM | SYSTOLIC BLOOD PRESSURE: 125 MMHG | BODY MASS INDEX: 43.05 KG/M2 | WEIGHT: 258.38 LBS

## 2023-07-05 DIAGNOSIS — I27.9 CHRONIC PULMONARY HEART DISEASE: Primary | ICD-10-CM

## 2023-07-05 DIAGNOSIS — I27.20 PULMONARY HYPERTENSION: ICD-10-CM

## 2023-07-05 DIAGNOSIS — R06.02 SHORTNESS OF BREATH: ICD-10-CM

## 2023-07-05 DIAGNOSIS — I27.9 CHRONIC PULMONARY HEART DISEASE: ICD-10-CM

## 2023-07-05 DIAGNOSIS — I27.20 PULMONARY HTN: ICD-10-CM

## 2023-07-05 LAB
B-HCG UR QL: NEGATIVE
CTP QC/QA: YES

## 2023-07-05 PROCEDURE — 93451 RIGHT HEART CATH: CPT | Mod: 26,,, | Performed by: INTERNAL MEDICINE

## 2023-07-05 PROCEDURE — 94010 BREATHING CAPACITY TEST: CPT | Mod: 53,S$GLB,, | Performed by: INTERNAL MEDICINE

## 2023-07-05 PROCEDURE — C1751 CATH, INF, PER/CENT/MIDLINE: HCPCS | Performed by: INTERNAL MEDICINE

## 2023-07-05 PROCEDURE — 93451 RIGHT HEART CATH: CPT | Performed by: INTERNAL MEDICINE

## 2023-07-05 PROCEDURE — 25000003 PHARM REV CODE 250: Performed by: INTERNAL MEDICINE

## 2023-07-05 PROCEDURE — 94010 BREATHING CAPACITY TEST: ICD-10-PCS | Mod: 53,S$GLB,, | Performed by: INTERNAL MEDICINE

## 2023-07-05 PROCEDURE — 81025 URINE PREGNANCY TEST: CPT | Performed by: INTERNAL MEDICINE

## 2023-07-05 PROCEDURE — 93451 PR RIGHT HEART CATH O2 SATURATION & CARDIAC OUTPUT: ICD-10-PCS | Mod: 26,,, | Performed by: INTERNAL MEDICINE

## 2023-07-05 PROCEDURE — C1894 INTRO/SHEATH, NON-LASER: HCPCS | Performed by: INTERNAL MEDICINE

## 2023-07-05 RX ORDER — LIDOCAINE HYDROCHLORIDE 20 MG/ML
INJECTION, SOLUTION INFILTRATION; PERINEURAL
Status: DISCONTINUED | OUTPATIENT
Start: 2023-07-05 | End: 2023-07-05 | Stop reason: HOSPADM

## 2023-07-05 NOTE — BRIEF OP NOTE
[facility]  Brief Operative Note  Cardiology    SUMMARY     Surgery Date: 7/5/2023     Surgeon(s) and Role:     * Kavon Kunz MD - Primary    Assisting Surgeon: None    Pre-op Diagnosis:  Chronic pulmonary heart disease [I27.9]  Pulmonary hypertension [I27.20]    Post-op Diagnosis: Post-Op Diagnosis Codes:     * Chronic pulmonary heart disease [I27.9]     * Pulmonary hypertension [I27.20]      Procedure(s) (LRB):  INSERTION, CATHETER, RIGHT HEART (Right)    Procedure Performed: RHC    Description of the findings of the procedure: RHC performed via the right IJ. Patient tolerated the procedure well. At rest, upper normal left and normal right side filling pressures (RA= 9 mm of Hg, PCWP= 16 mm of Hg). Mild pulmonary HTN  (PA=45/18 mm of Hg, PA mean=27 mm of Hg, TPG=11, PVR=1.6). High cardiac index and output  (CI=3.1 L/min/m2, CO=6.8 L/min). After bike exercise, her PA pressures kali significantly to (74/25 mm of Hg, PA mean=41 mm of hg) and her PCWP to 25 mm of Hg.     Estimated Blood Loss: < 10 cc    Plan:   - Routine post-cath care.  - Results communicated to Dr. Steiner who will see the patient in clinic.      Kavon Kunz MD

## 2023-07-05 NOTE — PATIENT INSTRUCTIONS

## 2023-07-05 NOTE — Clinical Note
The PA catheter was repositioned to the right pulmonary artery. Hemodynamics were performed. O2 saturation was measured at 59%.

## 2023-07-05 NOTE — PLAN OF CARE
Pt arrived to unit accompanied by her family.  Pre op orders and assessment initiated. Call bell within reach.

## 2023-07-05 NOTE — NURSING
Received report from Maritza LEDEZMA RN. Patient s/p RHC, AAOx3. VSS, no c/o pain or discomfort at this time, resp even and unlabored. Gauze/tegaderm dressing to RIJ is CDI. No active bleeding. No hematoma noted. Post procedure protocol reviewed with patient and patient's family. Understanding verbalized. Family members at bedside. Nurse call bell within reach. Will continue to monitor per post procedure protocol.

## 2023-07-05 NOTE — INTERVAL H&P NOTE
Patient seen and examined. No interval change since last H&P. Here for a RHC to assess her hemodynamics.   Access via the right IJ  7 Fr venous sheath  Risks and benefits of the procedure were explained to the patient. All questions were answered.  Consents were signed and in the chart.    Kavon Kunz MD

## 2023-07-05 NOTE — DISCHARGE SUMMARY
[facility] - [facility] Lab  Discharge Note  Short Stay    Procedure(s) (LRB):  INSERTION, CATHETER, RIGHT HEART (Right)      OUTCOME: Patient tolerated treatment/procedure well without complication and is now ready for discharge.    DISPOSITION: Home or Self Care       Medication List        ASK your doctor about these medications      albuterol 90 mcg/actuation inhaler  Commonly known as: PROVENTIL/VENTOLIN HFA  Inhale 2 puffs into the lungs every 6 (six) hours as needed for Wheezing.     albuterol-ipratropium 2.5 mg-0.5 mg/3 mL nebulizer solution  Commonly known as: DUO-NEB  TAKE 3 MLS BY NEBULIZATION EVERY 6 (SIX) HOURS AS NEEDED FOR WHEEZING. RESCUE     benzonatate 200 MG capsule  Commonly known as: TESSALON     bumetanide 2 MG tablet  Commonly known as: BUMEX     dextroamphetamine-amphetamine 15 mg tablet  Commonly known as: ADDERALL     DULERA 200-5 mcg/actuation inhaler  Generic drug: mometasone-formoterol  TAKE 2 PUFFS BY MOUTH TWICE A DAY     ergocalciferol 50,000 unit Cap  Commonly known as: ERGOCALCIFEROL     ezetimibe 10 mg tablet  Commonly known as: ZETIA  Take 1 tablet (10 mg total) by mouth once daily.     fluticasone propionate 50 mcg/actuation nasal spray  Commonly known as: FLONASE  2 sprays (100 mcg total) by Each Nare route once daily.     folic acid 1 MG tablet  Commonly known as: FOLVITE     hydrOXYchloroQUINE 200 mg tablet  Commonly known as: PLAQUENIL  Take 1 tablet (200 mg total) by mouth 2 (two) times daily.     magnesium oxide 400 mg (241.3 mg magnesium) tablet  Commonly known as: MAG-OX  Take 1 tablet (400 mg total) by mouth once daily.     montelukast 10 mg tablet  Commonly known as: SINGULAIR     nintedanib 150 mg Cap  Commonly known as: OFEV  Take 1 capsule (150 mg total) by mouth every 12 (twelve) hours.     OZEMPIC 0.25 mg or 0.5 mg(2 mg/1.5 mL) pen injector  Generic drug: semaglutide  Inject 0.5 mg into the skin every 7 days.     potassium chloride SA 20 MEQ tablet  Commonly known as:  K-DUR,KLOR-CON  Take 1 tablet (20 mEq total) by mouth once daily.     predniSONE 10 MG tablet  Commonly known as: DELTASONE  TAKE 1 TABLET BY MOUTH EVERY DAY     promethazine 6.25 mg/5 mL syrup  Commonly known as: PHENERGAN     QUEtiapine 50 MG tablet  Commonly known as: SEROQUEL     sulfamethoxazole-trimethoprim 800-160mg 800-160 mg Tab  Commonly known as: BACTRIM DS  TAKE 1 TABLET BY MOUTH THREE TIMES WEEKLY     VIIBRYD 10 mg (7)- 20 mg (23) Dspk  Generic drug: vilazodone                FINAL DIAGNOSIS:  Pulmonary HTN    FOLLOWUP: In clinic    DISCHARGE INSTRUCTIONS:      Cardiac diet    Activity as tolerated    Kavon Kunz MD

## 2023-07-05 NOTE — DISCHARGE INSTRUCTIONS
AFTER THE PROCEDURE:   -DO NOT DRINK OR EAT ANYTHING UNTIL NO LONGER HOARSE   -You may remove the bandage in 24 hours and wash with soap and water.   -You may shower, but do not soak in a tub for three days.   PRECAUTIONS FOR THE NEXT 24 HOURS:   -If you need to cough, sneeze, have a bowel movement, or bear down, hold pressure over your bandage.   -Do not  anything heavier than a gallon of milk(about 5 pounds)   -Avoid excessive bending over.   SYMPTOMS TO WATCH FOR AND REPORT TO YOUR DOCTOR:   -BLEEDING: hold pressure over the site until bleeding stops. Proceed to Emergency Room by ambulance (do not drive yourself) if unable to stop bleeding. Notify your doctor.   -HEMATOMA(hard bruise under the skin): Simon around the bruise if one develops. Call your doctor if it increases in size or if you have difficulty talking, swallowing, breathing or anything unusual.   SIGNS OF INFECTION:Fever (temperature over 100.5 F), pus or redness   -RASH   -CHEST PAIN OR SHORTNESS OF BREATH   You may call you coordinator in the Heart Failure/Heart Transplant/Pulmonary Hypertension Clinic at [phone] during normal business hours(Monday through Friday from 8 A.M. to 5 P.M.) After hours, call the Heart Transplant Service doctor on call at [phone].

## 2023-07-05 NOTE — Clinical Note
The PA catheter was repositioned to the RPW. Hemodynamics were performed. O2 saturation was measured at 94%.

## 2023-07-05 NOTE — PROGRESS NOTES
Patient arrrived for pft's with sob.  Attempted one spirometry and patient refused to continue due to sob.  Advised patient to reschedule when feeling better.

## 2023-07-05 NOTE — NURSING
Patient discharged per MD orders. Instructions given on medications, wound care, activity, signs of infection, when to call MD, and follow up appointments. Pt verbalized understanding.  Patient AAOx3, VSS, no c/o pain or discomfort at this time. Patient left unit ambulatory with family to clinic.

## 2023-07-06 NOTE — H&P
Subjective:      Daija Luna is a 43 y.o. female with pulmonary HTN who is here for a RHC. .    Past Medical History:   Diagnosis Date    ADHD (attention deficit hyperactivity disorder)     Asthma     Hypertension     Hypothyroidism     Mixed restrictive and obstructive lung disease     Pulmonary hypertension     Rheumatoid arthritis flare 06/22/2021    TIA (transient ischemic attack)      Past Surgical History:   Procedure Laterality Date    BRONCHOSCOPY Bilateral 8/16/2019    Procedure: Bronchoscopy;  Surgeon: Virgilio Weiss MD;  Location: [facility];  Service: Endoscopy;  Laterality: Bilateral;       Other significant clinical information:  Review of patient's allergies indicates:   Allergen Reactions    Ceftriaxone Swelling     Patient states that BP spike when taken rocephin.     Citrus and derivatives     Codeine Swelling     No current facility-administered medications for this encounter.     Current Outpatient Medications   Medication Sig Dispense Refill    albuterol 90 mcg/actuation inhaler Inhale 2 puffs into the lungs every 6 (six) hours as needed for Wheezing. 1 Inhaler 1    albuterol-ipratropium (DUO-NEB) 2.5 mg-0.5 mg/3 mL nebulizer solution TAKE 3 MLS BY NEBULIZATION EVERY 6 (SIX) HOURS AS NEEDED FOR WHEEZING. RESCUE 90 mL 0    benzonatate (TESSALON) 200 MG capsule Take 200 mg by mouth 3 (three) times daily as needed.      bumetanide (BUMEX) 2 MG tablet Take 2 mg by mouth 2 (two) times daily.      dextroamphetamine-amphetamine (ADDERALL) 15 mg tablet       ezetimibe (ZETIA) 10 mg tablet Take 1 tablet (10 mg total) by mouth once daily. 30 tablet 3    fluticasone (FLONASE) 50 mcg/actuation nasal spray 2 sprays (100 mcg total) by Each Nare route once daily. 16 g 2    folic acid (FOLVITE) 1 MG tablet Take 1,000 mcg by mouth once daily.      hydrOXYchloroQUINE (PLAQUENIL) 200 mg tablet Take 1 tablet (200 mg total) by mouth 2 (two) times daily. 60 tablet 6    magnesium oxide (MAG-OX) 400 mg  (241.3 mg magnesium) tablet Take 1 tablet (400 mg total) by mouth once daily. 90 tablet 1    montelukast (SINGULAIR) 10 mg tablet SMARTSI Tablet(s) By Mouth Every Evening      nintedanib (OFEV) 150 mg Cap Take 1 capsule (150 mg total) by mouth every 12 (twelve) hours. 60 capsule 11    potassium chloride SA (K-DUR,KLOR-CON) 20 MEQ tablet Take 1 tablet (20 mEq total) by mouth once daily. 90 tablet 1    predniSONE (DELTASONE) 10 MG tablet TAKE 1 TABLET BY MOUTH EVERY DAY 30 tablet 3    promethazine (PHENERGAN) 6.25 mg/5 mL syrup SMARTSI.5-10 Milliliter(s) By Mouth 3 Times Daily      QUEtiapine (SEROQUEL) 50 MG tablet Take 50 mg by mouth every evening.      sulfamethoxazole-trimethoprim 800-160mg (BACTRIM DS) 800-160 mg Tab TAKE 1 TABLET BY MOUTH THREE TIMES WEEKLY 12 tablet 5    vilazodone (VIIBRYD) 10 mg (7)- 20 mg (23) DsPk       DULERA 200-5 mcg/actuation inhaler TAKE 2 PUFFS BY MOUTH TWICE A DAY 13 g 11    ergocalciferol (ERGOCALCIFEROL) 50,000 unit Cap Take 50,000 Units by mouth every 7 days.      semaglutide (OZEMPIC) 0.25 mg or 0.5 mg(2 mg/1.5 mL) pen injector Inject 0.5 mg into the skin every 7 days. 3 mL 1     Review of Systems   Constitutional: Negative.   HENT: Negative.     Eyes: Negative.    Cardiovascular: Negative.    Respiratory: Negative.     Endocrine: Positive for heat intolerance. Negative for cold intolerance.   Skin: Negative.    Musculoskeletal: Negative.    Gastrointestinal: Negative.    Genitourinary: Negative.    Neurological: Negative.    Psychiatric/Behavioral: Negative.        Objective    Physical Exam  Constitutional:       Appearance: Normal appearance. She is not ill-appearing.   HENT:      Head: Normocephalic.   Eyes:      Extraocular Movements: Extraocular movements intact.      Conjunctiva/sclera: Conjunctivae normal.   Cardiovascular:      Rate and Rhythm: Normal rate and regular rhythm.      Pulses: Normal pulses.      Heart sounds: Normal heart sounds.   Pulmonary:       Effort: Pulmonary effort is normal.      Breath sounds: Normal breath sounds.   Abdominal:      Palpations: Abdomen is soft.   Musculoskeletal:         General: Normal range of motion.      Cervical back: Normal range of motion.   Skin:     General: Skin is warm and dry.      Capillary Refill: Capillary refill takes less than 2 seconds.   Neurological:      Mental Status: She is oriented to person, place, and time.   Psychiatric:         Mood and Affect: Mood normal.         Behavior: Behavior normal.         Assessment:   Plan for a RHC to assess her hemodynamics.   Access via the right IJ  7 Fr venous sheath  Risks and benefits of the procedure were explained to the patient. All questions were answered.  Consents were signed and in the chart.     Kavon Kunz MD

## 2023-07-07 ENCOUNTER — PATIENT MESSAGE (OUTPATIENT)
Dept: CARDIOLOGY | Facility: HOSPITAL | Age: 43
End: 2023-07-07
Payer: COMMERCIAL

## 2023-07-07 ENCOUNTER — PATIENT MESSAGE (OUTPATIENT)
Dept: INFECTIOUS DISEASES | Facility: CLINIC | Age: 43
End: 2023-07-07
Payer: COMMERCIAL

## 2023-07-11 ENCOUNTER — INFUSION (OUTPATIENT)
Dept: INFUSION THERAPY | Facility: HOSPITAL | Age: 43
End: 2023-07-11
Attending: INTERNAL MEDICINE
Payer: COMMERCIAL

## 2023-07-11 ENCOUNTER — LAB VISIT (OUTPATIENT)
Dept: LAB | Facility: HOSPITAL | Age: 43
End: 2023-07-11
Payer: COMMERCIAL

## 2023-07-11 VITALS
DIASTOLIC BLOOD PRESSURE: 89 MMHG | SYSTOLIC BLOOD PRESSURE: 134 MMHG | HEIGHT: 65 IN | OXYGEN SATURATION: 94 % | HEART RATE: 89 BPM | WEIGHT: 257.69 LBS | TEMPERATURE: 98 F | BODY MASS INDEX: 42.93 KG/M2 | RESPIRATION RATE: 16 BRPM

## 2023-07-11 DIAGNOSIS — M06.9 RHEUMATOID ARTHRITIS FLARE: Primary | ICD-10-CM

## 2023-07-11 DIAGNOSIS — Z79.899 HIGH RISK MEDICATION USE: ICD-10-CM

## 2023-07-11 LAB — B-HCG UR QL: NEGATIVE

## 2023-07-11 PROCEDURE — 63600175 PHARM REV CODE 636 W HCPCS: Performed by: INTERNAL MEDICINE

## 2023-07-11 PROCEDURE — 96366 THER/PROPH/DIAG IV INF ADDON: CPT

## 2023-07-11 PROCEDURE — 25000003 PHARM REV CODE 250: Performed by: INTERNAL MEDICINE

## 2023-07-11 PROCEDURE — 96375 TX/PRO/DX INJ NEW DRUG ADDON: CPT

## 2023-07-11 PROCEDURE — 81025 URINE PREGNANCY TEST: CPT | Performed by: INTERNAL MEDICINE

## 2023-07-11 PROCEDURE — 96365 THER/PROPH/DIAG IV INF INIT: CPT

## 2023-07-11 RX ORDER — FAMOTIDINE 10 MG/ML
20 INJECTION INTRAVENOUS
Status: CANCELLED | OUTPATIENT
Start: 2023-07-17

## 2023-07-11 RX ORDER — ACETAMINOPHEN 325 MG/1
650 TABLET ORAL
Status: COMPLETED | OUTPATIENT
Start: 2023-07-11 | End: 2023-07-11

## 2023-07-11 RX ORDER — METHYLPREDNISOLONE SOD SUCC 125 MG
100 VIAL (EA) INJECTION
Status: COMPLETED | OUTPATIENT
Start: 2023-07-11 | End: 2023-07-11

## 2023-07-11 RX ORDER — IPRATROPIUM BROMIDE AND ALBUTEROL SULFATE 2.5; .5 MG/3ML; MG/3ML
SOLUTION RESPIRATORY (INHALATION)
COMMUNITY
Start: 2023-07-11

## 2023-07-11 RX ORDER — HEPARIN 100 UNIT/ML
500 SYRINGE INTRAVENOUS
Status: CANCELLED | OUTPATIENT
Start: 2023-07-17

## 2023-07-11 RX ORDER — ACETAMINOPHEN 325 MG/1
650 TABLET ORAL
Status: CANCELLED | OUTPATIENT
Start: 2023-07-17

## 2023-07-11 RX ORDER — PREDNISONE 20 MG/1
TABLET ORAL
COMMUNITY
Start: 2023-07-11 | End: 2024-07-17

## 2023-07-11 RX ORDER — METHYLPREDNISOLONE SOD SUCC 125 MG
100 VIAL (EA) INJECTION
Status: CANCELLED
Start: 2023-07-17

## 2023-07-11 RX ORDER — SODIUM CHLORIDE 0.9 % (FLUSH) 0.9 %
10 SYRINGE (ML) INJECTION
Status: CANCELLED | OUTPATIENT
Start: 2023-07-17

## 2023-07-11 RX ORDER — NINTEDANIB 150 MG/1
150 CAPSULE ORAL
COMMUNITY
Start: 2023-07-11 | End: 2023-07-17

## 2023-07-11 RX ORDER — VILAZODONE HYDROCHLORIDE 20 MG/1
TABLET ORAL
COMMUNITY
Start: 2023-07-07 | End: 2023-07-17

## 2023-07-11 RX ORDER — HYDROXYCHLOROQUINE SULFATE 200 MG/1
2 TABLET, FILM COATED ORAL DAILY
COMMUNITY
Start: 2023-06-12

## 2023-07-11 RX ORDER — BUMETANIDE 1 MG/1
1 TABLET ORAL 2 TIMES DAILY
COMMUNITY
Start: 2023-06-30 | End: 2024-03-14 | Stop reason: SDUPTHER

## 2023-07-11 RX ORDER — DIPHENHYDRAMINE HYDROCHLORIDE 50 MG/ML
25 INJECTION INTRAMUSCULAR; INTRAVENOUS
Status: COMPLETED | OUTPATIENT
Start: 2023-07-11 | End: 2023-07-11

## 2023-07-11 RX ORDER — FAMOTIDINE 10 MG/ML
20 INJECTION INTRAVENOUS
Status: COMPLETED | OUTPATIENT
Start: 2023-07-11 | End: 2023-07-11

## 2023-07-11 RX ORDER — DIPHENHYDRAMINE HYDROCHLORIDE 50 MG/ML
25 INJECTION INTRAMUSCULAR; INTRAVENOUS
Status: CANCELLED
Start: 2023-07-17

## 2023-07-11 RX ADMIN — FAMOTIDINE 20 MG: 10 INJECTION INTRAVENOUS at 10:07

## 2023-07-11 RX ADMIN — RITUXIMAB 1000 MG: 10 INJECTION, SOLUTION INTRAVENOUS at 10:07

## 2023-07-11 RX ADMIN — ACETAMINOPHEN 650 MG: 325 TABLET ORAL at 10:07

## 2023-07-11 RX ADMIN — METHYLPREDNISOLONE SODIUM SUCCINATE 100 MG: 125 INJECTION, POWDER, FOR SOLUTION INTRAMUSCULAR; INTRAVENOUS at 10:07

## 2023-07-11 RX ADMIN — DIPHENHYDRAMINE HYDROCHLORIDE 25 MG: 50 INJECTION INTRAMUSCULAR; INTRAVENOUS at 10:07

## 2023-07-11 NOTE — DISCHARGE INSTRUCTIONS
[facility]  [address] [address]  [address]  [phone] phone     [phone] fax  Hours of Operation: Monday- Friday 8:00am- 5:00pm  After hours phone  [phone]  Hematology / Oncology Physicians on call    Dr. Alfa Kaba      Nurse Practitioners:    Rola Orourke, SCOTT Barnard, SCOTT Mistry, SCOTT Bryson, SCOTT Oshea, NP  Evelyn Frost, PA      Please don't hesitate to call if you have any concerns.   Oncology: Managing Fatigue   Fatigue is a common side effect of chemotherapy and radiation therapy. It can be caused by worry, lack of sleep, and poor appetite. Fatigue can also be a sign of anemia (a shortage of red blood cells). This could require medical treatment. The tips below can help you feel better.      Family members can help with meals and chores around the house.      Conserving Energy   Note the times of day when you are most tired and plan around them. For instance, if you are more tired in the afternoon, try to get tasks done in the morning.   Decide which tasks are most important. Do those first.   Pass tasks along to others when you need to. Ask for help.   Accept help when its offered. Tell people what they can do to help. For instance, you may need someone to fix a meal, fold clothes, or put gas in your car.   Plan rest times. You may want to take a nap each day. Just sitting quietly for a few minutes can make you feel more rested.  What You Can Do to Feel Better   Relax before you try to sleep. Take a bath or read for a while.   Form a sleep pattern. Go to bed at the same time each night and get up at the same time each morning.   Eat well. Choose foods from all of the food groups each day.   Exercise. Take a brisk walk to help increase your energy.   Avoid caffeine and alcohol. Drink plenty of water or fruit juices instead.  Treating Anemia   If you begin to feel  more tired than normal, tell your doctor. Fatigue could be a sign of anemia. This problem is fairly common during chemotherapy and radiation treatments. If your red blood cell count is too low, you are likely to receive a blood transfusion. Most people feel better shortly after the transfusion. In some cases, medication may be prescribed to increase red blood cell production.   Call Your Doctor If You Have:   Shortness of breath or chest pain   A dizzy feeling when you get up from lying or sitting down   Paler skin than normal   Extreme tiredness that is not helped by sleep    © 7485-3626 [employer] [facility], [address] [place] [address]. All rights reserved. This information is not intended as a substitute for professional medical care. Always follow your healthcare professional's instructions. FALL PREVENTION   Falls often occur due to slipping, tripping or losing your balance. Here are ways to reduce your risk of falling again.   Was there anything that caused your fall that can be fixed, removed or replaced?   Make your home safe by keeping walkways clear of objects you may trip over.   Use non-slip pads under rugs.   Do not walk in poorly lit areas.   Do not stand on chairs or wobbly ladders.   Use caution when reaching overhead or looking upward. This position can cause a loss of balance.   Be sure your shoes fit properly, have non-slip bottoms and are in good condition.   Be cautious when going up and down stairs, curbs, and when walking on uneven sidewalks.   If your balance is poor, consider using a cane or walker.   If your fall was related to alcohol use, stop or limit alcohol intake.   If your fall was related to use of sleeping medicines, talk to your doctor about this. You may need to reduce your dosage at bedtime if you awaken during the night to go to the bathroom.   To reduce the need for nighttime bathroom trips:   Avoid drinking fluids for several hours before going to bed   Empty your  bladder before going to bed   Men can keep a urinal at the bedside   © 9280-0424 [employer], [address] [place] [address], [place] [address]. All rights reserved. This information is not intended as a substitute for professional medical care. Always follow your healthcare professional's instructions.

## 2023-07-14 ENCOUNTER — HOSPITAL ENCOUNTER (OUTPATIENT)
Dept: RADIOLOGY | Facility: HOSPITAL | Age: 43
Discharge: HOME | End: 2023-07-14
Attending: INTERNAL MEDICINE
Payer: COMMERCIAL

## 2023-07-14 DIAGNOSIS — I27.20 PULMONARY HYPERTENSION: ICD-10-CM

## 2023-07-14 DIAGNOSIS — I27.9 CHRONIC PULMONARY HEART DISEASE: ICD-10-CM

## 2023-07-14 PROCEDURE — 71250 CT THORAX DX C-: CPT | Mod: TC

## 2023-07-14 PROCEDURE — 71250 CT CHEST HIGH RESOLUTION WITHOUT CONTRAST: ICD-10-PCS | Mod: 26,,, | Performed by: RADIOLOGY

## 2023-07-14 PROCEDURE — 71250 CT THORAX DX C-: CPT | Mod: 26,,, | Performed by: RADIOLOGY

## 2023-07-17 ENCOUNTER — OFFICE VISIT (OUTPATIENT)
Dept: TRANSPLANT | Facility: CLINIC | Age: 43
End: 2023-07-17
Payer: COMMERCIAL

## 2023-07-17 ENCOUNTER — HOSPITAL ENCOUNTER (OUTPATIENT)
Dept: PULMONOLOGY | Facility: CLINIC | Age: 43
Discharge: HOME | End: 2023-07-17
Payer: COMMERCIAL

## 2023-07-17 ENCOUNTER — TELEPHONE (OUTPATIENT)
Dept: TRANSPLANT | Facility: CLINIC | Age: 43
End: 2023-07-17

## 2023-07-17 VITALS
HEART RATE: 93 BPM | DIASTOLIC BLOOD PRESSURE: 81 MMHG | BODY MASS INDEX: 42.16 KG/M2 | SYSTOLIC BLOOD PRESSURE: 130 MMHG | HEIGHT: 65 IN | WEIGHT: 253.06 LBS | OXYGEN SATURATION: 95 %

## 2023-07-17 DIAGNOSIS — J98.4 MIXED RESTRICTIVE AND OBSTRUCTIVE LUNG DISEASE: ICD-10-CM

## 2023-07-17 DIAGNOSIS — I27.9 CHRONIC PULMONARY HEART DISEASE: ICD-10-CM

## 2023-07-17 DIAGNOSIS — J43.9 MIXED RESTRICTIVE AND OBSTRUCTIVE LUNG DISEASE: ICD-10-CM

## 2023-07-17 DIAGNOSIS — J84.9 ILD (INTERSTITIAL LUNG DISEASE): Primary | ICD-10-CM

## 2023-07-17 DIAGNOSIS — J84.112 IDIOPATHIC PULMONARY FIBROSIS: ICD-10-CM

## 2023-07-17 DIAGNOSIS — R76.8 ANTI-RNP ANTIBODIES PRESENT: ICD-10-CM

## 2023-07-17 DIAGNOSIS — M32.13 OTHER SYSTEMIC LUPUS ERYTHEMATOSUS WITH LUNG INVOLVEMENT: ICD-10-CM

## 2023-07-17 DIAGNOSIS — R06.09 DYSPNEA ON EXERTION: ICD-10-CM

## 2023-07-17 DIAGNOSIS — M35.1 MCTD (MIXED CONNECTIVE TISSUE DISEASE): ICD-10-CM

## 2023-07-17 DIAGNOSIS — I10 ESSENTIAL HYPERTENSION, BENIGN: ICD-10-CM

## 2023-07-17 DIAGNOSIS — R76.8 POSITIVE ANA (ANTINUCLEAR ANTIBODY): ICD-10-CM

## 2023-07-17 DIAGNOSIS — I27.20 PULMONARY HYPERTENSION: ICD-10-CM

## 2023-07-17 DIAGNOSIS — D89.89 AUTOIMMUNE DISORDER: ICD-10-CM

## 2023-07-17 PROCEDURE — 1159F PR MEDICATION LIST DOCUMENTED IN MEDICAL RECORD: ICD-10-PCS | Mod: CPTII,S$GLB,, | Performed by: INTERNAL MEDICINE

## 2023-07-17 PROCEDURE — 3079F DIAST BP 80-89 MM HG: CPT | Mod: CPTII,S$GLB,, | Performed by: INTERNAL MEDICINE

## 2023-07-17 PROCEDURE — 99215 PR OFFICE/OUTPT VISIT, EST, LEVL V, 40-54 MIN: ICD-10-PCS | Mod: S$GLB,,, | Performed by: INTERNAL MEDICINE

## 2023-07-17 PROCEDURE — 1159F MED LIST DOCD IN RCRD: CPT | Mod: CPTII,S$GLB,, | Performed by: INTERNAL MEDICINE

## 2023-07-17 PROCEDURE — 99999 PR PBB SHADOW E&M-EST. PATIENT-LVL IV: ICD-10-PCS | Mod: PBBFAC,,, | Performed by: INTERNAL MEDICINE

## 2023-07-17 PROCEDURE — 99999 PR PBB SHADOW E&M-EST. PATIENT-LVL IV: CPT | Mod: PBBFAC,,, | Performed by: INTERNAL MEDICINE

## 2023-07-17 PROCEDURE — 3008F BODY MASS INDEX DOCD: CPT | Mod: CPTII,S$GLB,, | Performed by: INTERNAL MEDICINE

## 2023-07-17 PROCEDURE — 3044F PR MOST RECENT HEMOGLOBIN A1C LEVEL <7.0%: ICD-10-PCS | Mod: CPTII,S$GLB,, | Performed by: INTERNAL MEDICINE

## 2023-07-17 PROCEDURE — 3044F HG A1C LEVEL LT 7.0%: CPT | Mod: CPTII,S$GLB,, | Performed by: INTERNAL MEDICINE

## 2023-07-17 PROCEDURE — 99215 OFFICE O/P EST HI 40 MIN: CPT | Mod: S$GLB,,, | Performed by: INTERNAL MEDICINE

## 2023-07-17 PROCEDURE — 3075F PR MOST RECENT SYSTOLIC BLOOD PRESS GE 130-139MM HG: ICD-10-PCS | Mod: CPTII,S$GLB,, | Performed by: INTERNAL MEDICINE

## 2023-07-17 PROCEDURE — 3075F SYST BP GE 130 - 139MM HG: CPT | Mod: CPTII,S$GLB,, | Performed by: INTERNAL MEDICINE

## 2023-07-17 PROCEDURE — 3079F PR MOST RECENT DIASTOLIC BLOOD PRESSURE 80-89 MM HG: ICD-10-PCS | Mod: CPTII,S$GLB,, | Performed by: INTERNAL MEDICINE

## 2023-07-17 PROCEDURE — 3008F PR BODY MASS INDEX (BMI) DOCUMENTED: ICD-10-PCS | Mod: CPTII,S$GLB,, | Performed by: INTERNAL MEDICINE

## 2023-07-17 RX ORDER — SEMAGLUTIDE 0.68 MG/ML
0.25 INJECTION, SOLUTION SUBCUTANEOUS WEEKLY
COMMUNITY
Start: 2023-06-30 | End: 2024-03-14

## 2023-07-17 NOTE — TELEPHONE ENCOUNTER
Spoke with Ms. Luna about the PH medication Tyvaso. Discussed pathophysiology of PH, indication for Tyvaso, and the role of Specialty Pharmacy. Once approval has been obtained, the Specialty Pharmacy nurse will schedule an in home visit to start patient on Tyvaso. They will provide additional education at that time and will be available 24 hours, 7 days a week for any questions or concerns.  Noted the route of delivery and dosage for Tyvaso. Patient will use the inhalation system and will load medicatoon themselves. The initial dose is 3 breaths of Tyvaso (18 mcg of treprostinil), per treatment session, 4 timee, daily. If 3 breaths are not tolerated, reduce to 1 or 2 breaths and subsequently increase to 3 breaths, as  tolerated. Goal is 12-15 breaths, per treatment session, 4 times a day, during waking hours. Each session should take 2-3 minutes.   Discussed side effects including,but not limited too; cough, headache, throat irritation, nausea, flushing and syncope. Instructed patient to contact our office should they have any concerning SE's.   Provided patient with a Tyvaso booklet. Gave information about the PHA website.   Answered all questions and concerns.

## 2023-07-17 NOTE — TELEPHONE ENCOUNTER
At provider BALJEET Steiner MD request, NN contacted Dr. Erlin Moore's office for most recent six minute walk distance. Dr. Moore's nurse states that they do not record the distance, just the SpO2 and when it is 88% or lower.  Advised that patient did not want to walk today at her appointment, due to having a recent walk.  Nurse states that they will bring her in to record the distance and fax our office the results. NN provided phone number and fax number.

## 2023-07-17 NOTE — PROGRESS NOTES
Subjective:   Patient ID:  Daija Luna is a 43 y.o. female who presents for follow-up of PH.    44 YO F w/ PMH of MCTD, SLE, ILD, HTN, pre-DM, obesity, hypothyroidism, TIA in 2018, ADHD on Adderal who presents for FU of her PH.    She sees Dr. Kain Mcclain MD as her primary cardiologist. In addition she sees pulmonology and rheumatology for her ILD and connective tissue disorders which include MCTD and SLE.    Her diagnosis of lupus, mixed connective tissue disease, and the associated interstitial lung disease all day back approximately 4 years. I saw her for initial consult 5/22/23 and she presents today for FU after her RHC.    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  She says that she does this near everyday of the week, and can work anywhere between 6 and 12 hours per day.  We did activities he denies any chest discomfort, but does have chest tightness.  Endorses shortness of breath.  For example when she walks from the parking lot to the office today, her oxygen levels are drop down to 80% but which he pauses in takes deep breath in she feels better.  Notes occasional palpitations.  Notes some occasional leg swelling which is worse in the end of the day.  Denies any PND or orthopnea.  Does have some presyncope type symptoms with coughing, but has not had any syncopal episodes.    PH Workup    RHC 7/5/23  RHC performed via the right IJ. Patient tolerated the procedure well. At rest, upper normal left and normal right side filling pressures (RA= 9 mm of Hg, PCWP= 16 mm of Hg). Mild pulmonary HTN  (PA=45/18 mm of Hg, PA mean=27 mm of Hg, TPG=11, PVR=1.6). High cardiac index and output  (CI=3.1 L/min/m2, CO=6.8 L/min). After bike exercise, her PA pressures kali significantly to (74/25 mm of Hg, PA mean=41 mm of hg) and her PCWP to 25 mm of Hg.    TTE 5/3/23  The left ventricle is normal in size with concentric  hypertrophy and normal systolic function.  Normal left ventricular diastolic function.  The estimated PA systolic pressure is 49 mmHg.  Normal right ventricular size with normal right ventricular systolic function.  There is pulmonary hypertension.  Normal central venous pressure (3 mmHg).  Moderate mitral regurgitation.  Moderate tricuspid regurgitation.  The estimated ejection fraction is 65%.  No pericardial effusion    V/Q scan Not done (WHO group III)    PFTs 7/11/23 (Outside)  FEV1 55%  FVC 51%  DLCO 30%    ANDERSON Positive (MCTD, SLE)    HIV Negative    BNP < 10, 5/22/23    Lung imaging 7/14/23   Findings compatible with chronic interstitial lung disease which appears unchanged.    Sleep apnea Eval ongoing    6 minute walk To be done today      Review of Systems   Constitutional: Positive for malaise/fatigue. Negative for chills and fever.   HENT:  Negative for hearing loss.    Eyes:  Negative for visual disturbance.   Cardiovascular:  Positive for chest pain, dyspnea on exertion, leg swelling and palpitations. Negative for irregular heartbeat, orthopnea, paroxysmal nocturnal dyspnea and syncope.   Respiratory:  Positive for shortness of breath. Negative for cough.    Musculoskeletal:  Negative for muscle weakness.   Gastrointestinal:  Negative for diarrhea, nausea and vomiting.   Neurological:  Negative for focal weakness.   Psychiatric/Behavioral:  Negative for memory loss.      Objective:     Vitals:    07/17/23 0917   BP: 130/81   Pulse: 93     Physical Exam  Constitutional:       Appearance: She is obese.   HENT:      Head: Atraumatic.   Eyes:      Extraocular Movements: Extraocular movements intact.   Cardiovascular:      Rate and Rhythm: Normal rate and regular rhythm.      Pulses: Normal pulses.      Heart sounds: Normal heart sounds.      Comments: JVP just above clavicle at 45 degrees.  Pulmonary:      Comments: Bilateral dry crackles heard diffusely, most prominent in lower lung fields.  Abdominal:       Palpations: Abdomen is soft.      Tenderness: There is no abdominal tenderness.   Musculoskeletal:         General: Normal range of motion.      Right lower leg: No edema.      Left lower leg: No edema.   Neurological:      General: No focal deficit present.      Mental Status: She is alert and oriented to person, place, and time.       Assessment:      1. ILD (interstitial lung disease)    2. Mixed restrictive and obstructive lung disease    3. Idiopathic pulmonary fibrosis    4. Pulmonary hypertension    5. Essential hypertension, benign    6. Dyspnea on exertion    7. Chronic pulmonary heart disease    8. Autoimmune disorder    9. Positive ANDERSON (antinuclear antibody)    10. Other systemic lupus erythematosus with lung involvement    11. Anti-RNP antibodies present    12. MCTD (mixed connective tissue disease)        Plan:     WHO group III Pulmonary hypertension  MCTD, SLE  ILD  ADHD on Adderal  - presents today after right heart catheterization was performed, results as noted above.  Has mild pulmonary hypertension, and based on pulmonary function testing, appears to be primarily related to his interstitial lung disease.  Findings most consistent with who group 3 pulmonary hypertension.  Functional class 2-3 symptoms.  - appears euvolemic on exam today.  - will start Tyvaso.  Met with PH coordinators upon completion of visit.  - Continue FU with sleep medicine, pulmonology, and rheumatology    RTC in 3 months with 6 MW

## 2023-07-25 DIAGNOSIS — J84.9 ILD (INTERSTITIAL LUNG DISEASE): ICD-10-CM

## 2023-07-25 DIAGNOSIS — J84.112 IDIOPATHIC PULMONARY FIBROSIS: ICD-10-CM

## 2023-07-25 NOTE — TELEPHONE ENCOUNTER
----- Message from Jailene Bustillos sent at 7/25/2023 12:58 PM CDT -----  Edwin SOLARES called in this afternoon requesting a new prescription for the medication nintedanib (OFEV) 150 mg Cap. Please call back [phone] fax [phone]

## 2023-07-26 ENCOUNTER — TELEPHONE (OUTPATIENT)
Dept: CARDIOLOGY | Facility: HOSPITAL | Age: 43
End: 2023-07-26
Payer: COMMERCIAL

## 2023-07-27 ENCOUNTER — PATIENT MESSAGE (OUTPATIENT)
Dept: TRANSPLANT | Facility: CLINIC | Age: 43
End: 2023-07-27
Payer: COMMERCIAL

## 2023-08-01 ENCOUNTER — TELEPHONE (OUTPATIENT)
Dept: TRANSPLANT | Facility: CLINIC | Age: 43
End: 2023-08-01
Payer: COMMERCIAL

## 2023-08-01 NOTE — TELEPHONE ENCOUNTER
Fax received for approval of Tyvaso PA# [id number] MA From 7/31/23-7/31/24

## 2023-08-29 ENCOUNTER — PATIENT MESSAGE (OUTPATIENT)
Dept: TRANSPLANT | Facility: CLINIC | Age: 43
End: 2023-08-29
Payer: COMMERCIAL

## 2023-08-29 ENCOUNTER — TELEPHONE (OUTPATIENT)
Dept: TRANSPLANT | Facility: CLINIC | Age: 43
End: 2023-08-29
Payer: COMMERCIAL

## 2023-09-06 ENCOUNTER — PATIENT MESSAGE (OUTPATIENT)
Dept: TRANSPLANT | Facility: CLINIC | Age: 43
End: 2023-09-06
Payer: COMMERCIAL

## 2023-09-06 ENCOUNTER — TELEPHONE (OUTPATIENT)
Dept: TRANSPLANT | Facility: CLINIC | Age: 43
End: 2023-09-06
Payer: COMMERCIAL

## 2023-09-09 ENCOUNTER — PATIENT MESSAGE (OUTPATIENT)
Dept: RHEUMATOLOGY | Facility: CLINIC | Age: 43
End: 2023-09-09
Payer: COMMERCIAL

## 2023-09-09 DIAGNOSIS — M35.1 MCTD (MIXED CONNECTIVE TISSUE DISEASE): Primary | ICD-10-CM

## 2023-09-27 ENCOUNTER — PATIENT MESSAGE (OUTPATIENT)
Dept: INFUSION THERAPY | Facility: HOSPITAL | Age: 43
End: 2023-09-27
Payer: COMMERCIAL

## 2023-09-27 ENCOUNTER — PATIENT MESSAGE (OUTPATIENT)
Dept: RHEUMATOLOGY | Facility: CLINIC | Age: 43
End: 2023-09-27

## 2023-09-27 ENCOUNTER — OFFICE VISIT (OUTPATIENT)
Dept: RHEUMATOLOGY | Facility: CLINIC | Age: 43
End: 2023-09-27
Payer: COMMERCIAL

## 2023-09-27 DIAGNOSIS — Z79.899 DRUG-INDUCED IMMUNODEFICIENCY: ICD-10-CM

## 2023-09-27 DIAGNOSIS — J84.9 ILD (INTERSTITIAL LUNG DISEASE): ICD-10-CM

## 2023-09-27 DIAGNOSIS — M35.1 MCTD (MIXED CONNECTIVE TISSUE DISEASE): Primary | ICD-10-CM

## 2023-09-27 DIAGNOSIS — M06.00 SERONEGATIVE RHEUMATOID ARTHRITIS: ICD-10-CM

## 2023-09-27 DIAGNOSIS — Z79.899 HIGH RISK MEDICATION USE: Primary | ICD-10-CM

## 2023-09-27 DIAGNOSIS — D84.821 DRUG-INDUCED IMMUNODEFICIENCY: ICD-10-CM

## 2023-09-27 PROCEDURE — 3044F HG A1C LEVEL LT 7.0%: CPT | Mod: CPTII,95,, | Performed by: INTERNAL MEDICINE

## 2023-09-27 PROCEDURE — 1160F RVW MEDS BY RX/DR IN RCRD: CPT | Mod: CPTII,95,, | Performed by: INTERNAL MEDICINE

## 2023-09-27 PROCEDURE — 99214 OFFICE O/P EST MOD 30 MIN: CPT | Mod: 95,,, | Performed by: INTERNAL MEDICINE

## 2023-09-27 PROCEDURE — 3044F PR MOST RECENT HEMOGLOBIN A1C LEVEL <7.0%: ICD-10-PCS | Mod: CPTII,95,, | Performed by: INTERNAL MEDICINE

## 2023-09-27 PROCEDURE — 99214 PR OFFICE/OUTPT VISIT, EST, LEVL IV, 30-39 MIN: ICD-10-PCS | Mod: 95,,, | Performed by: INTERNAL MEDICINE

## 2023-09-27 PROCEDURE — 1160F PR REVIEW ALL MEDS BY PRESCRIBER/CLIN PHARMACIST DOCUMENTED: ICD-10-PCS | Mod: CPTII,95,, | Performed by: INTERNAL MEDICINE

## 2023-09-27 PROCEDURE — 1159F MED LIST DOCD IN RCRD: CPT | Mod: CPTII,95,, | Performed by: INTERNAL MEDICINE

## 2023-09-27 PROCEDURE — 1159F PR MEDICATION LIST DOCUMENTED IN MEDICAL RECORD: ICD-10-PCS | Mod: CPTII,95,, | Performed by: INTERNAL MEDICINE

## 2023-09-27 NOTE — Clinical Note
Please continue rituximab 1000 mg 2 doses every 4 months with all 4 labs and quantitative immunoglobulins.  Thanks.

## 2023-09-27 NOTE — PROGRESS NOTES
RHEUMATOLOGY FOLLOW UP - TELE VISIT     The patient location is: [place]  The chief complaint leading to consultation is:  Follow-up visit  Visit type: Virtual visit with synchronous audio and video  Total time spent with patient:  15 minutes  Each patient to whom he or she provides medical services by telemedicine is:  (1) informed of the relationship between the physician and patient and the respective role of any other health care provider with respect to management of the patient; and (2) notified that he or she may decline to receive medical services by telemedicine and may withdraw from such care at any time.    Chief complaints, HPI, ROS, EXAM, Assessment & Plans:-  Daija Garcia a 43 y.o. pleasant female seen today for follow-up visit.She has a complex history of mixed connective tissue disease with interstitial lung disease and lupus arthritis.  She is on Plaquenil for arthritis.  She is on Ofev for interstitial lung disease and received rituximab 07/2021.  Failed CellCept.  Intolerance to Prograf, imuran and Cytoxan oral therapy.  Recently reestablish care and was restarted on rituximab.  On prednisone 20 mg from her pulmonologist.  Reports significant improvement of joint pain and shortness of breath.  No active symptoms today. Rheumatological review of system negative otherwise.  100% fist formation bilateral hands.  No evidence of synovitis.    1. MCTD (mixed connective tissue disease)    2. ILD (interstitial lung disease)    3. Seronegative rheumatoid arthritis    4. Drug-induced immunodeficiency      Problem List Items Addressed This Visit       ILD (interstitial lung disease)    Relevant Orders    CBC Auto Differential    Comprehensive Metabolic Panel    C-Reactive Protein    Sedimentation rate    Immunoglobulins (IgG, IgA, IgM) Quantitative    MCTD (mixed connective tissue disease) - Primary    Relevant Orders    CBC Auto Differential    Comprehensive Metabolic Panel    C-Reactive Protein     Sedimentation rate    Immunoglobulins (IgG, IgA, IgM) Quantitative    Drug-induced immunodeficiency    Seronegative rheumatoid arthritis    Relevant Orders    CBC Auto Differential    Comprehensive Metabolic Panel    C-Reactive Protein    Sedimentation rate    Immunoglobulins (IgG, IgA, IgM) Quantitative      Latest Reference Range & Units 07/05/23 07:15   WBC 3.90 - 12.70 K/uL 19.87 (H)   RBC 4.00 - 5.40 M/uL 4.50   Hemoglobin 12.0 - 16.0 g/dL 9.0 (L)   Hematocrit 37.0 - 48.5 % 31.4 (L)   MCV 82 - 98 fL 70 (L)   MCH 27.0 - 31.0 pg 20.0 (L)   MCHC 32.0 - 36.0 g/dL 28.7 (L)   RDW 11.5 - 14.5 % 23.7 (H)   Platelets 150 - 450 K/uL 520 (H)   MPV 9.2 - 12.9 fL 10.2   Gran % 38.0 - 73.0 % 64.2   Lymph % 18.0 - 48.0 % 24.3   Mono % 4.0 - 15.0 % 9.0   Eosinophil % 0.0 - 8.0 % 1.5   Basophil % 0.0 - 1.9 % 0.3   Immature Granulocytes 0.0 - 0.5 % 0.7 (H)   Gran # (ANC) 1.8 - 7.7 K/uL 12.8 (H)   Lymph # 1.0 - 4.8 K/uL 4.8   Mono # 0.3 - 1.0 K/uL 1.8 (H)   Eos # 0.0 - 0.5 K/uL 0.3   Baso # 0.00 - 0.20 K/uL 0.06   Immature Grans (Abs) 0.00 - 0.04 K/uL 0.13 (H)   nRBC 0 /100 WBC 0   Differential Method  Automated   Protime 9.0 - 12.5 sec 10.5   INR 0.8 - 1.2  1.0   Sodium 136 - 145 mmol/L 140   Potassium 3.5 - 5.1 mmol/L 3.5   Chloride 95 - 110 mmol/L 105   CO2 23 - 29 mmol/L 22 (L)   Anion Gap 8 - 16 mmol/L 13   BUN 6 - 20 mg/dL 11   Creatinine 0.5 - 1.4 mg/dL 1.0   eGFR >60 mL/min/1.73 m^2 >60.0   Glucose 70 - 110 mg/dL 96   Calcium 8.7 - 10.5 mg/dL 9.2   Alkaline Phosphatase 55 - 135 U/L 77   PROTEIN TOTAL 6.0 - 8.4 g/dL 7.0   Albumin 3.5 - 5.2 g/dL 3.1 (L)   BILIRUBIN TOTAL 0.1 - 1.0 mg/dL 0.3   AST 10 - 40 U/L 17   ALT 10 - 44 U/L 12   (H): Data is abnormally high  (L): Data is abnormally low   Latest Reference Range & Units Most Recent   ANDERSON Negative  Positive !  7/25/19 09:17   ANDERSON Screen Negative <1:80  Positive !  5/22/23 14:35   ANDERSON HEP-2 Titer  Positive 1:1280 Speckled  8/8/19 13:26   ANDERSON Titer 1  1:320  5/22/23 14:35    ANDERSON PATTERN 1  Speckled  5/22/23 14:35   ds DNA Ab Negative 1:10  Negative 1:10  5/22/23 14:35   Anti-SSA Antibody 0.00 - 0.99 Ratio 0.29  5/22/23 14:35   Anti-SSA Interpretation Negative  Negative  5/22/23 14:35   Anti-SSB Antibody 0.00 - 0.99 Ratio 0.06  5/22/23 14:35   Anti-SSB Interpretation Negative  Negative  5/22/23 14:35   Anti Sm Antibody 0.00 - 0.99 Ratio 0.21  5/22/23 14:35   Anti-Sm Interpretation Negative  Negative  5/22/23 14:35   Anti Sm/RNP Antibody 0.00 - 0.99 Ratio 0.40  5/22/23 14:35   Anti-Sm/RNP Interpretation Negative  Negative  5/22/23 14:35   ANCA Proteinase 3 <0.4 (Negative) U <0.2  8/8/19 13:26   Cytoplasmic Neutrophilic Ab <1:20 Titer <1:20  8/8/19 13:26   MPO <=20 UNITS 4  8/8/19 13:26   Perinuclear (P-ANCA) <1:20 Titer <1:20  8/8/19 13:26   Scleroderma SCL- <20 UNITS 5  8/8/19 13:26   SSA Antibody 0.0 - 0.9 AI 0.5  7/25/19 09:17   SSB Antibody 0.0 - 0.9 AI 2.4 (H)  7/25/19 09:17   CCP Antibodies <5.0 U/mL 0.5  8/8/19 13:26   Complement (C-3) 50 - 180 mg/dL 138  5/6/21 10:20   Complement (C-4) 11 - 44 mg/dL 33  5/6/21 10:20   IgE 0 - 100 IU/mL <35  5/6/21 10:20   Protein, Serum 6.0 - 8.4 g/dL 7.6  8/8/19 13:26   Albumin grams/dl 3.35 - 5.55 g/dL 3.63  8/8/19 13:26   Alpha-1 grams/dl 0.17 - 0.41 g/dL 0.39  8/8/19 13:26   Alpha-2 0.43 - 0.99 g/dL 0.93  8/8/19 13:26   Beta 0.50 - 1.10 g/dL 1.11 (H)  8/8/19 13:26   Gamma 0.67 - 1.58 g/dL 1.54  8/8/19 13:26   Pathologist Interpretation SPE  REVIEWED  8/8/19 13:26   Pathologist Interpretation ELIZABETH  REVIEWED  8/8/19 13:26   Immunofix Interp.  SEE COMMENT  8/8/19 13:26   Anti-Aleena-1 Antibody <20 Units <20  8/8/19 13:26   Anti-PM/Scl Ab <20 Units <20  8/8/19 13:26   Anti-SS-A 52 kD Ab, IgG <20 Units <20  8/8/19 13:26   Anti-U1-RNP  Ab <20 Units 68 (H)  8/8/19 13:26   EJ Negative  Negative  8/8/19 13:26   Fibrillarin (U3 RNP) Negative  Negative  8/8/19 13:26   Ku Negative  Negative  8/8/19 13:26   MDA-5 (P140) (CADM-140) <20 Units <20  8/8/19 13:26    MI-2 Negative  Negative  8/8/19 13:26   NXP-2 (P140) <20 Units <20  8/8/19 13:26   OJ Negative  Negative  8/8/19 13:26   PL-12 Negative  Negative  8/8/19 13:26   PL-7 Negative  Negative  8/8/19 13:26   Rheumatoid Factor 0.0 - 15.0 IU/mL <10.0  8/8/19 13:26   RNP Antibody 0.0 - 0.9 AI 1.7 (H)  7/25/19 09:17   SRP Negative  Negative  8/8/19 13:26   TIF1 GAMMA (P155/140) <20 Units <20  8/8/19 13:26   U2 snRNP Negative  Negative  8/8/19 13:26   !: Data is abnormal  (H): Data is abnormally high  Severe case of RNP antibody positive mixed connective tissue disease with seronegative rheumatoid, interstitial lung disease secondary to connective tissue disease on rituximab, Ofev and Plaquenil.  Still taking prednisone 20 mg daily.  Advised to discuss with pulmonologist and reduce prednisone to 10 mg daily.  Continue rituximab every 4 months with all 4 labs and quantitative immunoglobulins.  Drug induced immunodeficiency due to use of immunosuppressive drugs. Monitor carefully for infections. Advised to get immediate medical care if any infection. Also advised strict adherence to age appropriate vaccinations and cancer screenings with PCP.  I have explained all of the above in detail and the patient understands all of the current recommendation(s). I have answered all questions to the best of my ability and to their complete satisfaction.   # Follow up in about 3 months (around 12/27/2023).      Past Medical History:   Diagnosis Date    ADHD (attention deficit hyperactivity disorder)     Asthma     Hypertension     Hypothyroidism     Mixed restrictive and obstructive lung disease     Pulmonary hypertension     Rheumatoid arthritis flare 06/22/2021    TIA (transient ischemic attack)        Past Surgical History:   Procedure Laterality Date    BRONCHOSCOPY Bilateral 8/16/2019    Procedure: Bronchoscopy;  Surgeon: Virgilio Weiss MD;  Location: [facility];  Service: Endoscopy;  Laterality: Bilateral;    RIGHT HEART  CATHETERIZATION Right 2023    Procedure: INSERTION, CATHETER, RIGHT HEART;  Surgeon: Kavon Kunz MD;  Location: [facility] CATH LAB;  Service: Cardiology;  Laterality: Right;        Social History     Tobacco Use    Smoking status: Never    Smokeless tobacco: Never   Substance Use Topics    Alcohol use: Not Currently     Comment: social    Drug use: No       Family History   Problem Relation Age of Onset    Cancer Maternal Aunt         Breast, Back, Lung Cancer    Cancer Father        Review of patient's allergies indicates:   Allergen Reactions    Ceftriaxone Swelling     Patient states that BP spike when taken rocephin.     Citrus and derivatives     Codeine Swelling       Medication List with Changes/Refills   Current Medications    ALBUTEROL 90 MCG/ACTUATION INHALER    Inhale 2 puffs into the lungs every 6 (six) hours as needed for Wheezing.    ALBUTEROL-IPRATROPIUM (DUO-NEB) 2.5 MG-0.5 MG/3 ML NEBULIZER SOLUTION    INHALE 1 VIAL PER NEUBULIZER NEBULIZER EVERY 4 HOURS    BENZONATATE (TESSALON) 200 MG CAPSULE    Take 200 mg by mouth 3 (three) times daily as needed.    BUMETANIDE (BUMEX) 1 MG TABLET    Take 1 mg by mouth 2 (two) times daily.    DEXTROAMPHETAMINE-AMPHETAMINE (ADDERALL) 15 MG TABLET        EZETIMIBE (ZETIA) 10 MG TABLET    Take 1 tablet (10 mg total) by mouth once daily.    FLUTICASONE (FLONASE) 50 MCG/ACTUATION NASAL SPRAY    2 sprays (100 mcg total) by Each Nare route once daily.    FOLIC ACID (FOLVITE) 1 MG TABLET    Take 1,000 mcg by mouth once daily.    HYDROXYCHLOROQUINE (PLAQUENIL) 200 MG TABLET    Take 1 tablet (200 mg total) by mouth 2 (two) times daily.    HYDROXYCHLOROQUINE (PLAQUENIL) 200 MG TABLET    Take 2 tablets by mouth once daily.    MAGNESIUM OXIDE (MAG-OX) 400 MG (241.3 MG MAGNESIUM) TABLET    Take 1 tablet (400 mg total) by mouth once daily.    MONTELUKAST (SINGULAIR) 10 MG TABLET    SMARTSI Tablet(s) By Mouth Every Evening    NINTEDANIB (OFEV) 150 MG CAP    Take 1 capsule  (150 mg total) by mouth every 12 (twelve) hours.    POTASSIUM CHLORIDE SA (K-DUR,KLOR-CON) 20 MEQ TABLET    Take 1 tablet (20 mEq total) by mouth once daily.    PREDNISONE (DELTASONE) 20 MG TABLET    Take by mouth.    PROMETHAZINE (PHENERGAN) 6.25 MG/5 ML SYRUP    SMARTSI.5-10 Milliliter(s) By Mouth 3 Times Daily    QUETIAPINE (SEROQUEL) 50 MG TABLET    Take 50 mg by mouth every evening.    SEMAGLUTIDE (OZEMPIC) 0.25 MG OR 0.5 MG (2 MG/3 ML) PEN INJECTOR    Inject 0.25 mg into the skin once a week.    SEMAGLUTIDE (OZEMPIC) 0.25 MG OR 0.5 MG(2 MG/1.5 ML) PEN INJECTOR    Inject 0.5 mg into the skin every 7 days.    SULFAMETHOXAZOLE-TRIMETHOPRIM 800-160MG (BACTRIM DS) 800-160 MG TAB    TAKE 1 TABLET BY MOUTH THREE TIMES WEEKLY    VILAZODONE (VIIBRYD) 10 MG (7)- 20 MG (23) DSPK           Disclaimer: This note was prepared using voice recognition system and is likely to have sound alike errors and is not proof read.  Please call me with any questions.

## 2023-09-27 NOTE — Clinical Note
Please schedule all 4 labs asap. Repeat rituximab 1000 mg two doses two weeks apart every 4 months with all 4 labs and quantitative immunoglobulins.

## 2023-11-08 RX ORDER — FAMOTIDINE 10 MG/ML
20 INJECTION INTRAVENOUS
Status: CANCELLED | OUTPATIENT
Start: 2023-11-21

## 2023-11-08 RX ORDER — ACETAMINOPHEN 325 MG/1
650 TABLET ORAL
Status: CANCELLED | OUTPATIENT
Start: 2023-11-21

## 2023-11-08 RX ORDER — SODIUM CHLORIDE 0.9 % (FLUSH) 0.9 %
10 SYRINGE (ML) INJECTION
Status: CANCELLED | OUTPATIENT
Start: 2023-11-21

## 2023-11-08 RX ORDER — HEPARIN 100 UNIT/ML
500 SYRINGE INTRAVENOUS
Status: CANCELLED | OUTPATIENT
Start: 2023-11-21

## 2023-11-08 RX ORDER — DIPHENHYDRAMINE HYDROCHLORIDE 50 MG/ML
25 INJECTION INTRAMUSCULAR; INTRAVENOUS
Status: CANCELLED
Start: 2023-11-21

## 2023-11-08 RX ORDER — METHYLPREDNISOLONE SOD SUCC 125 MG
100 VIAL (EA) INJECTION
Status: CANCELLED
Start: 2023-11-21

## 2023-11-13 ENCOUNTER — PATIENT MESSAGE (OUTPATIENT)
Dept: INFUSION THERAPY | Facility: HOSPITAL | Age: 43
End: 2023-11-13
Payer: COMMERCIAL

## 2023-11-16 DIAGNOSIS — R06.09 DYSPNEA ON EXERTION: ICD-10-CM

## 2023-11-16 DIAGNOSIS — R00.2 PALPITATION: Primary | ICD-10-CM

## 2023-11-27 ENCOUNTER — TELEPHONE (OUTPATIENT)
Dept: INFUSION THERAPY | Facility: HOSPITAL | Age: 43
End: 2023-11-27
Payer: COMMERCIAL

## 2023-11-27 NOTE — TELEPHONE ENCOUNTER
Left message for pt to call Lois in Rheum. Regarding missed rituxan infusion visit on 11/14 and upcoming infusion visit on 11/28.

## 2023-11-28 ENCOUNTER — TELEPHONE (OUTPATIENT)
Dept: TRANSPLANT | Facility: CLINIC | Age: 43
End: 2023-11-28
Payer: COMMERCIAL

## 2023-11-28 NOTE — TELEPHONE ENCOUNTER
LM on patient's VM again that patient has been unreachable for [facility] and [facility] office. Patient has not returned any calls and has not responded to letter from last month either.

## 2023-12-10 DIAGNOSIS — J84.9 ILD (INTERSTITIAL LUNG DISEASE): ICD-10-CM

## 2023-12-10 DIAGNOSIS — M32.13 OTHER SYSTEMIC LUPUS ERYTHEMATOSUS WITH LUNG INVOLVEMENT: ICD-10-CM

## 2023-12-11 RX ORDER — HYDROXYCHLOROQUINE SULFATE 200 MG/1
200 TABLET, FILM COATED ORAL 2 TIMES DAILY
Qty: 60 TABLET | Refills: 6 | Status: SHIPPED | OUTPATIENT
Start: 2023-12-11

## 2023-12-13 ENCOUNTER — PATIENT MESSAGE (OUTPATIENT)
Dept: RHEUMATOLOGY | Facility: CLINIC | Age: 43
End: 2023-12-13
Payer: COMMERCIAL

## 2023-12-13 ENCOUNTER — LAB VISIT (OUTPATIENT)
Dept: LAB | Facility: HOSPITAL | Age: 43
End: 2023-12-13
Attending: INTERNAL MEDICINE
Payer: COMMERCIAL

## 2023-12-13 ENCOUNTER — PATIENT MESSAGE (OUTPATIENT)
Dept: TRANSPLANT | Facility: CLINIC | Age: 43
End: 2023-12-13
Payer: COMMERCIAL

## 2023-12-13 ENCOUNTER — INFUSION (OUTPATIENT)
Dept: INFUSION THERAPY | Facility: HOSPITAL | Age: 43
End: 2023-12-13
Attending: INTERNAL MEDICINE
Payer: COMMERCIAL

## 2023-12-13 VITALS
OXYGEN SATURATION: 97 % | TEMPERATURE: 97 F | HEIGHT: 65 IN | HEART RATE: 91 BPM | DIASTOLIC BLOOD PRESSURE: 83 MMHG | RESPIRATION RATE: 18 BRPM | WEIGHT: 255.5 LBS | BODY MASS INDEX: 42.57 KG/M2 | SYSTOLIC BLOOD PRESSURE: 118 MMHG

## 2023-12-13 DIAGNOSIS — M06.9 RHEUMATOID ARTHRITIS FLARE: Primary | ICD-10-CM

## 2023-12-13 DIAGNOSIS — Z79.899 HIGH RISK MEDICATION USE: ICD-10-CM

## 2023-12-13 DIAGNOSIS — R68.89 CUSHINGOID FACIES: Primary | ICD-10-CM

## 2023-12-13 DIAGNOSIS — J84.9 ILD (INTERSTITIAL LUNG DISEASE): ICD-10-CM

## 2023-12-13 DIAGNOSIS — M35.1 MCTD (MIXED CONNECTIVE TISSUE DISEASE): ICD-10-CM

## 2023-12-13 DIAGNOSIS — M06.00 SERONEGATIVE RHEUMATOID ARTHRITIS: ICD-10-CM

## 2023-12-13 LAB
ALBUMIN SERPL BCP-MCNC: 3.7 G/DL (ref 3.5–5.2)
ALP SERPL-CCNC: 74 U/L (ref 55–135)
ALT SERPL W/O P-5'-P-CCNC: 18 U/L (ref 10–44)
ANION GAP SERPL CALC-SCNC: 15 MMOL/L (ref 8–16)
ANISOCYTOSIS BLD QL SMEAR: SLIGHT
AST SERPL-CCNC: 25 U/L (ref 10–40)
B-HCG UR QL: NEGATIVE
BASOPHILS # BLD AUTO: 0.06 K/UL (ref 0–0.2)
BASOPHILS NFR BLD: 0.3 % (ref 0–1.9)
BILIRUB SERPL-MCNC: 0.2 MG/DL (ref 0.1–1)
BUN SERPL-MCNC: 12 MG/DL (ref 6–20)
CALCIUM SERPL-MCNC: 9.1 MG/DL (ref 8.7–10.5)
CHLORIDE SERPL-SCNC: 104 MMOL/L (ref 95–110)
CO2 SERPL-SCNC: 23 MMOL/L (ref 23–29)
CREAT SERPL-MCNC: 1 MG/DL (ref 0.5–1.4)
CRP SERPL-MCNC: 11.6 MG/L (ref 0–8.2)
DACRYOCYTES BLD QL SMEAR: ABNORMAL
DIFFERENTIAL METHOD: ABNORMAL
EOSINOPHIL # BLD AUTO: 0.2 K/UL (ref 0–0.5)
EOSINOPHIL NFR BLD: 0.9 % (ref 0–8)
ERYTHROCYTE [DISTWIDTH] IN BLOOD BY AUTOMATED COUNT: 23.7 % (ref 11.5–14.5)
ERYTHROCYTE [SEDIMENTATION RATE] IN BLOOD BY PHOTOMETRIC METHOD: 104 MM/HR (ref 0–36)
EST. GFR  (NO RACE VARIABLE): >60 ML/MIN/1.73 M^2
GLUCOSE SERPL-MCNC: 90 MG/DL (ref 70–110)
HCT VFR BLD AUTO: 32.8 % (ref 37–48.5)
HGB BLD-MCNC: 9.4 G/DL (ref 12–16)
IGA SERPL-MCNC: 530 MG/DL (ref 40–350)
IGG SERPL-MCNC: 1130 MG/DL (ref 650–1600)
IGM SERPL-MCNC: 34 MG/DL (ref 50–300)
IMM GRANULOCYTES # BLD AUTO: 0.11 K/UL (ref 0–0.04)
IMM GRANULOCYTES NFR BLD AUTO: 0.6 % (ref 0–0.5)
LYMPHOCYTES # BLD AUTO: 4.6 K/UL (ref 1–4.8)
LYMPHOCYTES NFR BLD: 23 % (ref 18–48)
MCH RBC QN AUTO: 20.3 PG (ref 27–31)
MCHC RBC AUTO-ENTMCNC: 28.7 G/DL (ref 32–36)
MCV RBC AUTO: 71 FL (ref 82–98)
MONOCYTES # BLD AUTO: 1.9 K/UL (ref 0.3–1)
MONOCYTES NFR BLD: 9.3 % (ref 4–15)
NEUTROPHILS # BLD AUTO: 13.2 K/UL (ref 1.8–7.7)
NEUTROPHILS NFR BLD: 65.9 % (ref 38–73)
NRBC BLD-RTO: 0 /100 WBC
OVALOCYTES BLD QL SMEAR: ABNORMAL
PLATELET # BLD AUTO: 528 K/UL (ref 150–450)
PLATELET BLD QL SMEAR: ABNORMAL
PMV BLD AUTO: 10 FL (ref 9.2–12.9)
POIKILOCYTOSIS BLD QL SMEAR: SLIGHT
POLYCHROMASIA BLD QL SMEAR: ABNORMAL
POTASSIUM SERPL-SCNC: 3.4 MMOL/L (ref 3.5–5.1)
PROT SERPL-MCNC: 8.1 G/DL (ref 6–8.4)
RBC # BLD AUTO: 4.63 M/UL (ref 4–5.4)
SODIUM SERPL-SCNC: 142 MMOL/L (ref 136–145)
STOMATOCYTES BLD QL SMEAR: PRESENT
WBC # BLD AUTO: 19.98 K/UL (ref 3.9–12.7)

## 2023-12-13 PROCEDURE — 63600175 PHARM REV CODE 636 W HCPCS: Performed by: INTERNAL MEDICINE

## 2023-12-13 PROCEDURE — 86140 C-REACTIVE PROTEIN: CPT | Performed by: INTERNAL MEDICINE

## 2023-12-13 PROCEDURE — 81025 URINE PREGNANCY TEST: CPT | Performed by: INTERNAL MEDICINE

## 2023-12-13 PROCEDURE — 96375 TX/PRO/DX INJ NEW DRUG ADDON: CPT

## 2023-12-13 PROCEDURE — 96365 THER/PROPH/DIAG IV INF INIT: CPT

## 2023-12-13 PROCEDURE — 80053 COMPREHEN METABOLIC PANEL: CPT | Performed by: INTERNAL MEDICINE

## 2023-12-13 PROCEDURE — 96366 THER/PROPH/DIAG IV INF ADDON: CPT

## 2023-12-13 PROCEDURE — 25000003 PHARM REV CODE 250: Performed by: INTERNAL MEDICINE

## 2023-12-13 PROCEDURE — 36415 COLL VENOUS BLD VENIPUNCTURE: CPT | Performed by: INTERNAL MEDICINE

## 2023-12-13 PROCEDURE — 85652 RBC SED RATE AUTOMATED: CPT | Performed by: INTERNAL MEDICINE

## 2023-12-13 PROCEDURE — 82784 ASSAY IGA/IGD/IGG/IGM EACH: CPT | Mod: 59 | Performed by: INTERNAL MEDICINE

## 2023-12-13 PROCEDURE — 85025 COMPLETE CBC W/AUTO DIFF WBC: CPT | Performed by: INTERNAL MEDICINE

## 2023-12-13 RX ORDER — ACETAMINOPHEN 325 MG/1
650 TABLET ORAL
Status: CANCELLED | OUTPATIENT
Start: 2023-12-19

## 2023-12-13 RX ORDER — DIPHENHYDRAMINE HYDROCHLORIDE 50 MG/ML
25 INJECTION INTRAMUSCULAR; INTRAVENOUS
Status: COMPLETED | OUTPATIENT
Start: 2023-12-13 | End: 2023-12-13

## 2023-12-13 RX ORDER — FAMOTIDINE 10 MG/ML
20 INJECTION INTRAVENOUS
Status: COMPLETED | OUTPATIENT
Start: 2023-12-13 | End: 2023-12-13

## 2023-12-13 RX ORDER — METHYLPREDNISOLONE SOD SUCC 125 MG
100 VIAL (EA) INJECTION
Status: COMPLETED | OUTPATIENT
Start: 2023-12-13 | End: 2023-12-13

## 2023-12-13 RX ORDER — HEPARIN 100 UNIT/ML
500 SYRINGE INTRAVENOUS
Status: CANCELLED | OUTPATIENT
Start: 2023-12-19

## 2023-12-13 RX ORDER — SODIUM CHLORIDE 0.9 % (FLUSH) 0.9 %
10 SYRINGE (ML) INJECTION
Status: CANCELLED | OUTPATIENT
Start: 2023-12-19

## 2023-12-13 RX ORDER — DIPHENHYDRAMINE HYDROCHLORIDE 50 MG/ML
25 INJECTION INTRAMUSCULAR; INTRAVENOUS
Status: CANCELLED
Start: 2023-12-19

## 2023-12-13 RX ORDER — ACETAMINOPHEN 325 MG/1
650 TABLET ORAL
Status: COMPLETED | OUTPATIENT
Start: 2023-12-13 | End: 2023-12-13

## 2023-12-13 RX ORDER — METHYLPREDNISOLONE SOD SUCC 125 MG
100 VIAL (EA) INJECTION
Status: CANCELLED
Start: 2023-12-19

## 2023-12-13 RX ORDER — FAMOTIDINE 10 MG/ML
20 INJECTION INTRAVENOUS
Status: CANCELLED | OUTPATIENT
Start: 2023-12-19

## 2023-12-13 RX ADMIN — ACETAMINOPHEN 650 MG: 325 TABLET ORAL at 11:12

## 2023-12-13 RX ADMIN — FAMOTIDINE 20 MG: 10 INJECTION INTRAVENOUS at 11:12

## 2023-12-13 RX ADMIN — RITUXIMAB 1000 MG: 10 INJECTION, SOLUTION INTRAVENOUS at 11:12

## 2023-12-13 RX ADMIN — METHYLPREDNISOLONE SODIUM SUCCINATE 100 MG: 125 INJECTION, POWDER, FOR SOLUTION INTRAMUSCULAR; INTRAVENOUS at 11:12

## 2023-12-13 RX ADMIN — DIPHENHYDRAMINE HYDROCHLORIDE 25 MG: 50 INJECTION, SOLUTION INTRAMUSCULAR; INTRAVENOUS at 11:12

## 2023-12-13 RX ADMIN — SODIUM CHLORIDE: 9 INJECTION, SOLUTION INTRAVENOUS at 10:12

## 2023-12-13 NOTE — NURSING
"1340: Dr. MANCILLA at chairside re-assessing pt. Pt stated, "I feel a lot better, I don't feel constricted, I am breathing better." Dr. MANCILLA educated pt has been weaned down to 1l/nc. No dyspnea or respiratory distress noted. No new orders received from Dr. MANCILLA.  "

## 2023-12-13 NOTE — PLAN OF CARE
"Pt is stable. Pt administered Rituxan today. Pt voiced she felt "alright," today. POC discussed and no concerns noted from pt. Pt will follow up in two weeks.      Problem: Adult Inpatient Plan of Care  Goal: Plan of Care Review  Outcome: Ongoing, Progressing  Goal: Patient-Specific Goal (Individualized)  Outcome: Ongoing, Progressing  Flowsheets (Taken 12/13/2023 1054)  Anxieties, Fears or Concerns: Pt denies.  Individualized Care Needs:   Pt proivded pillow   legs elevated in reclined position. Ice water provided.     Problem: Infection  Goal: Absence of Infection Signs and Symptoms  Outcome: Ongoing, Progressing     Problem: Fatigue  Goal: Improved Activity Tolerance  Outcome: Ongoing, Progressing     Problem: Fall Injury Risk  Goal: Absence of Fall and Fall-Related Injury  Outcome: Ongoing, Progressing     "

## 2023-12-13 NOTE — NURSING
"1015: Pt noted to have intermittent  cough, O2 sat 87% on RA, edema noted to bilateral lower extremities. Audible wheezing noted and dyspnea, clears when pt coughs. Auscultated lung fields and clear lungs. Pt stated, "I feel like I need a breathing treatment." Notified Sasha DOE RN/RENNY of pt c/o and assessment. Sasha DOE RN/CN notified Dr. MANCILLA. This nurse getting oxygen for patient.   1017: Pt's o2 sats on pulse ox 78% pulse 140. O2 applied at 2l/nc. Pt stated, "I need a treatment right now, I feel like I'm constricting, and can't breath." This nurse notified Sasha SUTHERLAND/CN at chairside of pt's needs and c/o. Sasha DOE Rn/CN to have Dr. MANCILLA come assess pt at chair side.   1020: Pt's o2 sats on 2l/nc 93-97%, this nurse remains at chair side. Pt placed from lying to sitting position by Mary DOE MA  1025: Pt voiced she feels better on the oxygen. Pt's pulse ox 95% and pulse 91.  1027: Dr. MANCILLA at chairside to assess pt. See Dr. MANCILLA notes  1034: Dr. MANCILLA requested pt's Creatinine level, this nurse notified 1.0.  1040: Dr. MANCILLA ordered to resume Rituxan, pt self administered Bumex per Dr. MANCILLA see pt's medication list. Will carry out MD orders. No dyspnea noted. Will continue to monitor pt.  "

## 2023-12-13 NOTE — DISCHARGE INSTRUCTIONS
Thank you for allowing me to care for you today,  AYUSH GoodN, RN    [facility]  [address] [address]  [phone] phone     [phone] fax  Hours of Operation: Monday- Friday 7:00am- 5:30pm    FALL PREVENTION   Falls often occur due to slipping, tripping or losing your balance. Here are ways to reduce your risk of falling again.   Was there anything that caused your fall that can be fixed, removed or replaced?   Make your home safe by keeping walkways clear of objects you may trip over.   Use non-slip pads under rugs.   Do not walk in poorly lit areas.   Do not stand on chairs or wobbly ladders.   Use caution when reaching overhead or looking upward. This position can cause a loss of balance.   Be sure your shoes fit properly, have non-slip bottoms and are in good condition.   Be cautious when going up and down stairs, curbs, and when walking on uneven sidewalks.   If your balance is poor, consider using a cane or walker.   If your fall was related to alcohol use, stop or limit alcohol intake.   If your fall was related to use of sleeping medicines, talk to your doctor about this. You may need to reduce your dosage at bedtime if you awaken during the night to go to the bathroom.   To reduce the need for nighttime bathroom trips:   Avoid drinking fluids for several hours before going to bed   Empty your bladder before going to bed   Men can keep a urinal at the bedside   © 5567-3981 [facility], [address] [place] [address], [place] [address]. All rights reserved. This information is not intended as a substitute for professional medical care. Always follow your healthcare professional's instructions.

## 2023-12-27 ENCOUNTER — INFUSION (OUTPATIENT)
Dept: INFUSION THERAPY | Facility: HOSPITAL | Age: 43
End: 2023-12-27
Attending: INTERNAL MEDICINE
Payer: COMMERCIAL

## 2023-12-27 VITALS
DIASTOLIC BLOOD PRESSURE: 90 MMHG | OXYGEN SATURATION: 95 % | HEART RATE: 68 BPM | TEMPERATURE: 98 F | HEIGHT: 65 IN | RESPIRATION RATE: 18 BRPM | WEIGHT: 262.38 LBS | BODY MASS INDEX: 43.71 KG/M2 | SYSTOLIC BLOOD PRESSURE: 135 MMHG

## 2023-12-27 DIAGNOSIS — M06.9 RHEUMATOID ARTHRITIS FLARE: Primary | ICD-10-CM

## 2023-12-27 PROCEDURE — 96375 TX/PRO/DX INJ NEW DRUG ADDON: CPT

## 2023-12-27 PROCEDURE — 25000003 PHARM REV CODE 250: Performed by: INTERNAL MEDICINE

## 2023-12-27 PROCEDURE — 63600175 PHARM REV CODE 636 W HCPCS: Performed by: INTERNAL MEDICINE

## 2023-12-27 PROCEDURE — 96365 THER/PROPH/DIAG IV INF INIT: CPT

## 2023-12-27 PROCEDURE — 96366 THER/PROPH/DIAG IV INF ADDON: CPT

## 2023-12-27 RX ORDER — ACETAMINOPHEN 325 MG/1
650 TABLET ORAL
Status: COMPLETED | OUTPATIENT
Start: 2023-12-27 | End: 2023-12-27

## 2023-12-27 RX ORDER — METHYLPREDNISOLONE SOD SUCC 125 MG
100 VIAL (EA) INJECTION
Start: 2024-01-02

## 2023-12-27 RX ORDER — DIPHENHYDRAMINE HYDROCHLORIDE 50 MG/ML
25 INJECTION INTRAMUSCULAR; INTRAVENOUS
Start: 2024-01-02

## 2023-12-27 RX ORDER — ACETAMINOPHEN 325 MG/1
650 TABLET ORAL
OUTPATIENT
Start: 2024-01-02

## 2023-12-27 RX ORDER — DIPHENHYDRAMINE HYDROCHLORIDE 50 MG/ML
25 INJECTION INTRAMUSCULAR; INTRAVENOUS
Status: COMPLETED | OUTPATIENT
Start: 2023-12-27 | End: 2023-12-27

## 2023-12-27 RX ORDER — METHYLPREDNISOLONE SOD SUCC 125 MG
100 VIAL (EA) INJECTION
Status: COMPLETED | OUTPATIENT
Start: 2023-12-27 | End: 2023-12-27

## 2023-12-27 RX ORDER — FAMOTIDINE 10 MG/ML
20 INJECTION INTRAVENOUS
OUTPATIENT
Start: 2024-01-02

## 2023-12-27 RX ORDER — SODIUM CHLORIDE 0.9 % (FLUSH) 0.9 %
10 SYRINGE (ML) INJECTION
OUTPATIENT
Start: 2024-01-02

## 2023-12-27 RX ORDER — FAMOTIDINE 10 MG/ML
20 INJECTION INTRAVENOUS
Status: COMPLETED | OUTPATIENT
Start: 2023-12-27 | End: 2023-12-27

## 2023-12-27 RX ORDER — HEPARIN 100 UNIT/ML
500 SYRINGE INTRAVENOUS
OUTPATIENT
Start: 2024-01-02

## 2023-12-27 RX ADMIN — FAMOTIDINE 20 MG: 10 INJECTION INTRAVENOUS at 09:12

## 2023-12-27 RX ADMIN — ACETAMINOPHEN 650 MG: 325 TABLET ORAL at 09:12

## 2023-12-27 RX ADMIN — RITUXIMAB 1000 MG: 10 INJECTION, SOLUTION INTRAVENOUS at 09:12

## 2023-12-27 RX ADMIN — METHYLPREDNISOLONE SODIUM SUCCINATE 100 MG: 125 INJECTION, POWDER, FOR SOLUTION INTRAMUSCULAR; INTRAVENOUS at 09:12

## 2023-12-27 RX ADMIN — DIPHENHYDRAMINE HYDROCHLORIDE 25 MG: 50 INJECTION INTRAMUSCULAR; INTRAVENOUS at 09:12

## 2023-12-27 NOTE — PLAN OF CARE
Patient tolerated Rituxan well today; no adverse reaction noted.  POC reviewed with pt.  No questions or concerns voiced.  NAD noted upon discharge.  No significant new complaints voiced.   Has f/u appt(s) scheduled per MD request.      Problem: Adult Inpatient Plan of Care  Goal: Plan of Care Review  Outcome: Ongoing, Progressing  Flowsheets (Taken 12/27/2023 1537)  Plan of Care Reviewed With: patient  Goal: Patient-Specific Goal (Individualized)  Outcome: Ongoing, Progressing  Flowsheets (Taken 12/27/2023 1537)  Anxieties, Fears or Concerns: pt denies at this time  Individualized Care Needs: blanket, legs elevated, reclined position, drink provided.  Goal: Optimal Comfort and Wellbeing  Outcome: Ongoing, Progressing  Intervention: Provide Person-Centered Care  Flowsheets (Taken 12/27/2023 1537)  Trust Relationship/Rapport:   care explained   questions encouraged   choices provided   reassurance provided   emotional support provided   thoughts/feelings acknowledged   empathic listening provided   questions answered

## 2024-01-03 ENCOUNTER — PATIENT MESSAGE (OUTPATIENT)
Dept: TRANSPLANT | Facility: CLINIC | Age: 44
End: 2024-01-03
Payer: COMMERCIAL

## 2024-01-16 DIAGNOSIS — J84.9 ILD (INTERSTITIAL LUNG DISEASE): ICD-10-CM

## 2024-01-16 DIAGNOSIS — J84.112 IDIOPATHIC PULMONARY FIBROSIS: ICD-10-CM

## 2024-01-24 DIAGNOSIS — Z76.89 ENCOUNTER TO ESTABLISH CARE: ICD-10-CM

## 2024-01-24 DIAGNOSIS — R06.09 DYSPNEA ON EXERTION: Primary | ICD-10-CM

## 2024-01-26 ENCOUNTER — TELEPHONE (OUTPATIENT)
Dept: TRANSPLANT | Facility: CLINIC | Age: 44
End: 2024-01-26
Payer: COMMERCIAL
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
## 2024-01-26 NOTE — TELEPHONE ENCOUNTER
-late entry-    Evelyn,  I have not had any success reaching Ms. Daija Luna since she cancelled the in-home visit on 12/20. I called again today, and the phone rang about 6-7 times, then stopped as if someone picked up the call but it was silent. There was no recorded message to leave a voice mail either.  Pharmacy knows about not shipping any further medication until teaching has been completed.     Thanks,     Simon Vasquez RN

## 2024-02-27 DIAGNOSIS — R06.09 DYSPNEA ON EXERTION: Primary | ICD-10-CM

## 2024-02-27 DIAGNOSIS — Z00.00 ROUTINE ADULT HEALTH MAINTENANCE: ICD-10-CM

## 2024-03-14 ENCOUNTER — HOSPITAL ENCOUNTER (OUTPATIENT)
Dept: CARDIOLOGY | Facility: HOSPITAL | Age: 44
Discharge: HOME | End: 2024-03-14
Attending: INTERNAL MEDICINE
Payer: COMMERCIAL

## 2024-03-14 ENCOUNTER — OFFICE VISIT (OUTPATIENT)
Dept: CARDIOLOGY | Facility: CLINIC | Age: 44
End: 2024-03-14
Payer: COMMERCIAL

## 2024-03-14 VITALS
DIASTOLIC BLOOD PRESSURE: 84 MMHG | WEIGHT: 259.94 LBS | HEART RATE: 105 BPM | HEIGHT: 65 IN | OXYGEN SATURATION: 93 % | BODY MASS INDEX: 43.31 KG/M2 | SYSTOLIC BLOOD PRESSURE: 134 MMHG

## 2024-03-14 DIAGNOSIS — Z86.16 HISTORY OF COVID-19: ICD-10-CM

## 2024-03-14 DIAGNOSIS — Z00.00 ROUTINE ADULT HEALTH MAINTENANCE: ICD-10-CM

## 2024-03-14 DIAGNOSIS — F90.9 ATTENTION DEFICIT HYPERACTIVITY DISORDER (ADHD), UNSPECIFIED ADHD TYPE: ICD-10-CM

## 2024-03-14 DIAGNOSIS — I10 ESSENTIAL HYPERTENSION, BENIGN: ICD-10-CM

## 2024-03-14 DIAGNOSIS — R06.09 DYSPNEA ON EXERTION: ICD-10-CM

## 2024-03-14 DIAGNOSIS — R60.0 LOCALIZED EDEMA: ICD-10-CM

## 2024-03-14 DIAGNOSIS — J84.9 ILD (INTERSTITIAL LUNG DISEASE): ICD-10-CM

## 2024-03-14 DIAGNOSIS — J98.4 MIXED RESTRICTIVE AND OBSTRUCTIVE LUNG DISEASE: ICD-10-CM

## 2024-03-14 DIAGNOSIS — I27.20 PULMONARY HYPERTENSION: ICD-10-CM

## 2024-03-14 DIAGNOSIS — G47.33 OSA (OBSTRUCTIVE SLEEP APNEA): ICD-10-CM

## 2024-03-14 DIAGNOSIS — R06.09 DOE (DYSPNEA ON EXERTION): ICD-10-CM

## 2024-03-14 DIAGNOSIS — J43.9 MIXED RESTRICTIVE AND OBSTRUCTIVE LUNG DISEASE: ICD-10-CM

## 2024-03-14 DIAGNOSIS — E11.69 TYPE 2 DIABETES MELLITUS WITH OTHER SPECIFIED COMPLICATION, WITHOUT LONG-TERM CURRENT USE OF INSULIN: ICD-10-CM

## 2024-03-14 DIAGNOSIS — M32.13 OTHER SYSTEMIC LUPUS ERYTHEMATOSUS WITH LUNG INVOLVEMENT: ICD-10-CM

## 2024-03-14 DIAGNOSIS — G47.30 SLEEP APNEA, UNSPECIFIED TYPE: ICD-10-CM

## 2024-03-14 DIAGNOSIS — Z86.73 HISTORY OF TIA (TRANSIENT ISCHEMIC ATTACK): ICD-10-CM

## 2024-03-14 DIAGNOSIS — E78.2 MIXED HYPERLIPIDEMIA: Primary | ICD-10-CM

## 2024-03-14 LAB
OHS QRS DURATION: 118 MS
OHS QTC CALCULATION: 488 MS

## 2024-03-14 PROCEDURE — 93005 ELECTROCARDIOGRAM TRACING: CPT

## 2024-03-14 PROCEDURE — 1160F RVW MEDS BY RX/DR IN RCRD: CPT | Mod: CPTII,S$GLB,, | Performed by: INTERNAL MEDICINE

## 2024-03-14 PROCEDURE — 99215 OFFICE O/P EST HI 40 MIN: CPT | Mod: S$GLB,,, | Performed by: INTERNAL MEDICINE

## 2024-03-14 PROCEDURE — G2211 COMPLEX E/M VISIT ADD ON: HCPCS | Mod: S$GLB,,, | Performed by: INTERNAL MEDICINE

## 2024-03-14 PROCEDURE — 3008F BODY MASS INDEX DOCD: CPT | Mod: CPTII,S$GLB,, | Performed by: INTERNAL MEDICINE

## 2024-03-14 PROCEDURE — 1159F MED LIST DOCD IN RCRD: CPT | Mod: CPTII,S$GLB,, | Performed by: INTERNAL MEDICINE

## 2024-03-14 PROCEDURE — 93010 ELECTROCARDIOGRAM REPORT: CPT | Mod: ,,, | Performed by: INTERNAL MEDICINE

## 2024-03-14 PROCEDURE — 3079F DIAST BP 80-89 MM HG: CPT | Mod: CPTII,S$GLB,, | Performed by: INTERNAL MEDICINE

## 2024-03-14 PROCEDURE — 99999 PR PBB SHADOW E&M-EST. PATIENT-LVL V: CPT | Mod: PBBFAC,,, | Performed by: INTERNAL MEDICINE

## 2024-03-14 PROCEDURE — 3075F SYST BP GE 130 - 139MM HG: CPT | Mod: CPTII,S$GLB,, | Performed by: INTERNAL MEDICINE

## 2024-03-14 RX ORDER — LANOLIN ALCOHOL/MO/W.PET/CERES
400 CREAM (GRAM) TOPICAL DAILY
Qty: 90 TABLET | Refills: 1 | Status: SHIPPED | OUTPATIENT
Start: 2024-03-14

## 2024-03-14 RX ORDER — ATORVASTATIN CALCIUM 20 MG/1
20 TABLET, FILM COATED ORAL NIGHTLY
Qty: 90 TABLET | Refills: 1 | Status: SHIPPED | OUTPATIENT
Start: 2024-03-14 | End: 2025-03-14

## 2024-03-14 RX ORDER — BUMETANIDE 1 MG/1
1 TABLET ORAL 2 TIMES DAILY
Qty: 180 TABLET | Refills: 1 | Status: SHIPPED | OUTPATIENT
Start: 2024-03-14

## 2024-03-14 RX ORDER — POTASSIUM CHLORIDE 20 MEQ/1
20 TABLET, EXTENDED RELEASE ORAL DAILY
Qty: 90 TABLET | Refills: 1 | Status: SHIPPED | OUTPATIENT
Start: 2024-03-14

## 2024-03-14 RX ORDER — SEMAGLUTIDE 1.34 MG/ML
0.25 INJECTION, SOLUTION SUBCUTANEOUS
Qty: 3 ML | Refills: 1 | Status: SHIPPED | OUTPATIENT
Start: 2024-03-14 | End: 2024-06-11

## 2024-03-14 NOTE — PROGRESS NOTES
Subjective:   Patient ID:  Daija Luna is a 43 y.o. female who presents for cardiac consult of Annual Exam, Chest Pain (Rated at an 5. Worse with cough. ), Shortness of Breath (O2 at 93 today. Pt states O2 has been between 74-80. Pt took a breathing treatment before she got here. Pt seen her pulmonologist. ), and Palpitations      The patient came in today for cardiac consult of Annual Exam, Chest Pain (Rated at an 5. Worse with cough. ), Shortness of Breath (O2 at 93 today. Pt states O2 has been between 74-80. Pt took a breathing treatment before she got here. Pt seen her pulmonologist. ), and Palpitations    Daija Luna is a 43 y.o. female pt with HTN, ADHD, obesity, Hypothyroidism, TIA - 2018, SLE, depression, ILD, h/o COVID 19 presents for follow up CV eval.       6/7/21  Pt's ECHO last week with normal BI V function. She saw Dr. Luna in 2020. She has been coughing in 2019 for about 2 months, she had pulm eval had bronch, had elevated CRP and then saw Dr. MANCILLA for lupus on cellcept and plaquenil. She will start infusions and has been having more desatting episodes. She had treadmill stress test but desatted and had PVCs was started on BB. But started on CCB.     8/4/21  Nuclear stress, carotid u/s and Holter pending. She has not gotten them rescheduled had been sick earlier. She started Rituxan infusions, and will start Ofev. She is holding Verapamil due to possible side effects with Ofev. She also wants further pulm HTN workup/treatment, discussed will get testing rescheduled and refer for pulm HTN clinic.     4/27/23  Follow up since 8/2021. Nuclear stress neg for ischemia 10/2021. Holter neg 10/2021. LE u/s neg for DVT 10/2021.     Recent pulm visit -Erlin Moore MD  - interstitial lung disease. They are supposed to be on Imuran however it was never started secondary to her only doing baseline labs today.. They report that they are breathing has worsened. We have been trying to wean her  prednisone to as low as a level as possible and she had been off of it for a few days, before her shortness of breath returned. Their current symptoms are a new cough, normally minimally productive but now productive of lots of green phlegm. They have not been admitted to the hospital since last being seen she was seen in the emergency room at the [facility] and a CT scan of the chest was obtained. They are on oxygen at home but does not use it at work.- Erlin Moore MD      Pt had recent eval with pul Dr. Moore with changes in meds, tried to wean pred. Pt is unable to walk without oxygen - can drop to 70s. She still has palpitations, CP , SOB.   BP - 130s/90s. HR 97. BMI 44 - 266    Prior Holter inconclusive as pt did not wear it more than a few hours.   ECG - NSR, inc RBB, RVH, nonst    3/14/24  BP and HR stable. BMI 43 - 259 lbs. Lipids last year worse - Tc 303, . Tg 130.   Pt is more concerned about her heart lately - pt is more SOB, she has been to [facility] HTN clinic - trying to see heart vs lung causing issues.   She went to [facility] and was non compliant - She was started Tivaso    From [facility] HTN clinic -   presents today after right heart catheterization was performed, results as noted above.  Has mild pulmonary hypertension, and based on pulmonary function testing, appears to be primarily related to his interstitial lung disease.  Findings most consistent with who group 3 pulmonary hypertension.  Functional class 2-3 symptoms.  - appears euvolemic on exam today.  - will start Tyvaso.  Met with PH coordinators upon completion of visit.  - Continue FU with sleep medicine, pulmonology, and rheumatology      Patient has fairly good exercise tolerance. 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    FH - father - lung CA      Conclusion 10/2021      Normal myocardial perfusion scan. There is no evidence of myocardial ischemia or infarction.    There is a mild to moderate  intensity defect in the anterior wall of the left ventricle, secondary to breast attenuation.    The gated perfusion images showed an ejection fraction of 76% at rest. The gated perfusion images showed an ejection fraction of 65% post stress.    The EKG portion of this study is negative for ischemia.    The patient reported no chest pain during the stress test.    There were no arrhythmias during stress.    Conclusion 10/2021    There were rare hookup related artifacts. Overall, the study was of good quality. The tape was adequate (2 days , 48 hours, 0 minutes).  Sinus rhythm with heart rates varying between 80 and 145 BPM with an average of 96 BPM.  There were PVCs totalling 0 and averaging 0 per hour.  There were PACs totalling 0 and averaging 0 per hour.    Conclusion 10/2021    There is no evidence of a right lower extremity DVT.  There is no evidence of a left lower extremity DVT.    Results for orders placed during the hospital encounter of 06/02/21    Echo Color Flow Doppler? Yes    Interpretation Summary  · The left ventricle is normal in size with moderate concentric hypertrophy and normal systolic function.  · The estimated ejection fraction is 55%.  · Normal left ventricular diastolic function.  · Mild mitral regurgitation.  · Trivial posterior pericardial effusion.  · Normal central venous pressure (3 mmHg).  · The estimated PA systolic pressure is 34 mmHg.  · Normal right ventricular size with normal right ventricular systolic function.      Normal sinus rhythm   Right bundle branch block   Abnormal ECG   No previous ECGs available   Confirmed by KAISER WHYTE MD ([id number]) on 6/2/2021 11:22:29 PM     Past Medical History:   Diagnosis Date    ADHD (attention deficit hyperactivity disorder)     Asthma     Hypertension     Hypothyroidism     Mixed restrictive and obstructive lung disease     Pulmonary hypertension     Rheumatoid arthritis flare 06/22/2021    TIA (transient ischemic attack)        Past Surgical  History:   Procedure Laterality Date    BRONCHOSCOPY Bilateral 8/16/2019    Procedure: Bronchoscopy;  Surgeon: Virgilio Weiss MD;  Location: [facility] ENDO;  Service: Endoscopy;  Laterality: Bilateral;    RIGHT HEART CATHETERIZATION Right 7/5/2023    Procedure: INSERTION, CATHETER, RIGHT HEART;  Surgeon: Kavon Kunz MD;  Location: [facility] CATH LAB;  Service: Cardiology;  Laterality: Right;       Social History     Tobacco Use    Smoking status: Never    Smokeless tobacco: Never   Substance Use Topics    Alcohol use: Not Currently     Comment: social    Drug use: No       Family History   Problem Relation Age of Onset    Cancer Maternal Aunt         Breast, Back, Lung Cancer    Cancer Father        Patient's Medications   New Prescriptions    ATORVASTATIN (LIPITOR) 20 MG TABLET    Take 1 tablet (20 mg total) by mouth every evening.   Previous Medications    ALBUTEROL 90 MCG/ACTUATION INHALER    Inhale 2 puffs into the lungs every 6 (six) hours as needed for Wheezing.    ALBUTEROL-IPRATROPIUM (DUO-NEB) 2.5 MG-0.5 MG/3 ML NEBULIZER SOLUTION    INHALE 1 VIAL PER NEUBULIZER NEBULIZER EVERY 4 HOURS    BENZONATATE (TESSALON) 200 MG CAPSULE    Take 200 mg by mouth 3 (three) times daily as needed.    BUMETANIDE (BUMEX) 1 MG TABLET    Take 1 mg by mouth 2 (two) times daily.    DEXTROAMPHETAMINE-AMPHETAMINE (ADDERALL) 15 MG TABLET        EZETIMIBE (ZETIA) 10 MG TABLET    Take 1 tablet (10 mg total) by mouth once daily.    FLUTICASONE (FLONASE) 50 MCG/ACTUATION NASAL SPRAY    2 sprays (100 mcg total) by Each Nare route once daily.    FLUTICASONE-UMECLIDIN-VILANTER (TRELEGY ELLIPTA) 200-62.5-25 MCG INHALER    Inhale 1 puff into the lungs.    FOLIC ACID (FOLVITE) 1 MG TABLET    Take 1,000 mcg by mouth once daily.    HYDROXYCHLOROQUINE (PLAQUENIL) 200 MG TABLET    Take 2 tablets by mouth once daily.    HYDROXYCHLOROQUINE (PLAQUENIL) 200 MG TABLET    Take 1 tablet (200 mg total) by mouth 2 (two) times daily.    MAGNESIUM OXIDE  (MAG-OX) 400 MG (241.3 MG MAGNESIUM) TABLET    Take 1 tablet (400 mg total) by mouth once daily.    MONTELUKAST (SINGULAIR) 10 MG TABLET    SMARTSI Tablet(s) By Mouth Every Evening    NINTEDANIB (OFEV) 150 MG CAP    Take 1 capsule (150 mg total) by mouth every 12 (twelve) hours.    POTASSIUM CHLORIDE SA (K-DUR,KLOR-CON) 20 MEQ TABLET    Take 1 tablet (20 mEq total) by mouth once daily.    PREDNISONE (DELTASONE) 20 MG TABLET    Take by mouth.    PROMETHAZINE (PHENERGAN) 6.25 MG/5 ML SYRUP    SMARTSI.5-10 Milliliter(s) By Mouth 3 Times Daily    QUETIAPINE (SEROQUEL) 50 MG TABLET    Take 50 mg by mouth every evening.    SULFAMETHOXAZOLE-TRIMETHOPRIM 800-160MG (BACTRIM DS) 800-160 MG TAB    TAKE 1 TABLET BY MOUTH THREE TIMES WEEKLY    VILAZODONE (VIIBRYD) 10 MG (7)- 20 MG (23) DSPK       Modified Medications    Modified Medication Previous Medication    SEMAGLUTIDE (OZEMPIC) 0.25 MG OR 0.5 MG(2 MG/1.5 ML) PEN INJECTOR semaglutide (OZEMPIC) 0.25 mg or 0.5 mg(2 mg/1.5 mL) pen injector       Inject 0.25 mg into the skin every 7 days.    Inject 0.5 mg into the skin every 7 days.   Discontinued Medications    SEMAGLUTIDE (OZEMPIC) 0.25 MG OR 0.5 MG (2 MG/3 ML) PEN INJECTOR    Inject 0.25 mg into the skin once a week.       Review of Systems   Constitutional:  Positive for malaise/fatigue.   HENT: Negative.     Eyes: Negative.    Respiratory:  Positive for shortness of breath.    Cardiovascular:  Positive for palpitations and leg swelling.   Gastrointestinal: Negative.    Genitourinary: Negative.    Musculoskeletal: Negative.    Skin: Negative.    Neurological: Negative.    Endo/Heme/Allergies: Negative.    Psychiatric/Behavioral: Negative.     All 12 systems otherwise negative.      Wt Readings from Last 3 Encounters:   24 117.9 kg (259 lb 14.8 oz)   23 119 kg (262 lb 5.6 oz)   23 115.9 kg (255 lb 8.2 oz)     Temp Readings from Last 3 Encounters:   23 98 °F (36.7 °C)   23 97.3 °F (36.3 °C)  "  07/11/23 97.7 °F (36.5 °C)     BP Readings from Last 3 Encounters:   03/14/24 134/84   12/27/23 (!) 135/90   12/13/23 118/83     Pulse Readings from Last 3 Encounters:   03/14/24 105   12/27/23 68   12/13/23 91       /84 (BP Location: Left arm, Patient Position: Sitting, BP Method: Large (Manual))   Pulse 105   Ht 5' 5" (1.651 m)   Wt 117.9 kg (259 lb 14.8 oz)   SpO2 (!) 93%   BMI 43.25 kg/m²     Objective:   Physical Exam  Vitals and nursing note reviewed.   Constitutional:       General: She is not in acute distress.     Appearance: She is well-developed. She is obese. She is not diaphoretic.   HENT:      Head: Normocephalic and atraumatic.      Nose: Nose normal.      Mouth/Throat:      Pharynx: No oropharyngeal exudate.   Eyes:      General: No scleral icterus.        Right eye: No discharge.         Left eye: No discharge.      Conjunctiva/sclera: Conjunctivae normal.   Neck:      Thyroid: No thyromegaly.      Vascular: No JVD.   Cardiovascular:      Rate and Rhythm: Normal rate and regular rhythm.      Heart sounds: S1 normal and S2 normal. Murmur heard.      No friction rub. No gallop. No S3 or S4 sounds.   Pulmonary:      Effort: Pulmonary effort is normal. No respiratory distress.      Breath sounds: Normal breath sounds. No stridor. No wheezing or rales.   Chest:      Chest wall: No tenderness.   Abdominal:      General: Bowel sounds are normal. There is no distension.      Palpations: Abdomen is soft. There is no mass.      Tenderness: There is no abdominal tenderness. There is no rebound.   Genitourinary:     Comments: Deferred  Musculoskeletal:         General: No tenderness or deformity. Normal range of motion.      Cervical back: Normal range of motion and neck supple.   Lymphadenopathy:      Cervical: No cervical adenopathy.   Skin:     General: Skin is warm and dry.      Coloration: Skin is not pale.      Findings: No erythema or rash.   Neurological:      Mental Status: She is alert " and oriented to person, place, and time.      Motor: No abnormal muscle tone.      Coordination: Coordination normal.   Psychiatric:         Behavior: Behavior normal.         Thought Content: Thought content normal.         Judgment: Judgment normal.         Lab Results   Component Value Date     12/13/2023    K 3.4 (L) 12/13/2023     12/13/2023    CO2 23 12/13/2023    BUN 12 12/13/2023    CREATININE 1.0 12/13/2023    GLU 90 12/13/2023    HGBA1C 6.1 (H) 04/27/2023    MG 2.1 05/22/2023    AST 25 12/13/2023    ALT 18 12/13/2023    ALBUMIN 3.7 12/13/2023    PROT 8.1 12/13/2023    BILITOT 0.2 12/13/2023    WBC 19.98 (H) 12/13/2023    HGB 9.4 (L) 12/13/2023    HCT 32.8 (L) 12/13/2023    MCV 71 (L) 12/13/2023     (H) 12/13/2023    INR 1.0 07/05/2023    TSH 1.687 10/12/2021    CHOL 303 (H) 04/27/2023    HDL 64 04/27/2023    LDLCALC 213.0 (H) 04/27/2023    LDLCALC 220 (H) 07/25/2019    TRIG 130 04/27/2023    BNP <10 05/22/2023     Assessment:      1. Mixed hyperlipidemia    2. Dyspnea on exertion    3. ILD (interstitial lung disease)    4. Pulmonary hypertension    5. Mixed restrictive and obstructive lung disease    6. Essential hypertension, benign    7. History of TIA (transient ischemic attack)    8. BRADY (dyspnea on exertion)    9. Sleep apnea, unspecified type    10. Other systemic lupus erythematosus with lung involvement    11. Attention deficit hyperactivity disorder (ADHD), unspecified ADHD type    12. Localized edema    13. History of COVID-19    14. BMI 40.0-44.9, adult    15. Type 2 diabetes mellitus with other specified complication, without long-term current use of insulin    16. EMELIA (obstructive sleep apnea)             Plan:   1. HTN with edema  - titrate meds  - LE u/s neg for DVT 10/2021.     2. HLD with elevated TGs  - uncontrolled   - cont meds  - low minerva diet  -Lipids last year worse - Tc 303, . Tg 130.   - will start Lipitor 20mg     3. ILD with restrictive and obstructive  lung disease  - cont tx per pulm    4. SLE  - cont tx per PCP/rheum    5. H/O TIA  - rec asa, statin  - was on statin in past but caused interaction with lupus meds  - f/u neuro  - add zetia    6. BRADY, palpitations, h/o PVCs; h/o COVID 19  - was admitted to [facility]  -had   - Pulm HTN - discussed [facility] if symptoms are worse,  referral to PHTN clinic  - r/o EMELIA   - Prior Holter inconclusive as pt did not wear it more than a few hours.   - does not want to take verapamil with Ofev due to potential side effects   - order ECHO, pharm nuclear stress and 7 day viital     7. ADHD  - cont tx if needed - stimulants     8. Obesity, BMI 44 - 266 lbs, PreDM -->  BMI 43 - 259 lbs   - cont weight loss     9. DM2, PreDm  - restart Ozempic       Visit today included increased complexity associated with the care of the episodic problem TIA addressed and managing the longitudinal care of the patient due to the serious and/or complex managed problem(s) .      Thank you for allowing me to participate in this patient's care. Please do not hesitate to contact me with any questions or concerns. Consult note has been forwarded to the referral physician.

## 2024-03-19 ENCOUNTER — PATIENT MESSAGE (OUTPATIENT)
Dept: PULMONOLOGY | Facility: CLINIC | Age: 44
End: 2024-03-19
Payer: COMMERCIAL

## 2024-03-29 ENCOUNTER — PATIENT MESSAGE (OUTPATIENT)
Dept: CARDIOLOGY | Facility: CLINIC | Age: 44
End: 2024-03-29
Payer: COMMERCIAL

## 2024-04-03 ENCOUNTER — PATIENT MESSAGE (OUTPATIENT)
Dept: PULMONOLOGY | Facility: CLINIC | Age: 44
End: 2024-04-03
Payer: COMMERCIAL

## 2024-04-15 ENCOUNTER — TELEPHONE (OUTPATIENT)
Dept: CARDIOLOGY | Facility: CLINIC | Age: 44
End: 2024-04-15
Payer: COMMERCIAL

## 2024-04-15 NOTE — TELEPHONE ENCOUNTER
Called and LVM for pt letting her know her Nuclear stress testing was denied but her echo and heart monitor was approved. Dr. Mcclain wanted to f/u with pt to discuss other testing. Confirmed pt appointments via .      ----- Message -----  Kain Mcclain MD Collins, Chelae; [facility] Staff  OK please hold the testing if not approved and have her follow up with me will discuss other testing options. Thanks    - Dr. Mcclain          Previous Messages       ----- Message -----  From: Mike Westfall  Sent: 4/11/2024   8:53 AM CDT  To: Kain Mcclain MD; [facility] Staff  Subject: Denial                                          Good morning, Pt Daija Luna with appt date 04/22/2024, referral [id number], has been denied. An appeal may be done by calling [phone], optoin 4. The case number is [id number].    Thank you  Randy MONAE

## 2024-04-17 ENCOUNTER — TELEPHONE (OUTPATIENT)
Dept: CARDIOLOGY | Facility: HOSPITAL | Age: 44
End: 2024-04-17
Payer: COMMERCIAL

## 2024-04-22 ENCOUNTER — TELEPHONE (OUTPATIENT)
Dept: CARDIOLOGY | Facility: CLINIC | Age: 44
End: 2024-04-22
Payer: COMMERCIAL

## 2024-04-22 NOTE — TELEPHONE ENCOUNTER
Called and spoke to pt in regards to rescheduling her appointment for today for her echo and holter monitor. Pt rescheduled to 05/10/24 due to choosing [place] location specifically. Pt states she be sob and didn't want to walk far or go on any elevators.     ----- Message from Summer Bal sent at 4/22/2024  3:59 PM CDT -----  .Type:  Patient Call Back    Who Called: PT       Does the patient know what this is regarding?: PT CALLED TO HAVE APPOINTMENTS RESCHEDULED     Would the patient rather a call back  YES     Best Call Back Number: [phone]    Additional Information: Thank You

## 2024-04-24 ENCOUNTER — LAB VISIT (OUTPATIENT)
Dept: LAB | Facility: HOSPITAL | Age: 44
End: 2024-04-24
Attending: INTERNAL MEDICINE
Payer: COMMERCIAL

## 2024-04-24 ENCOUNTER — INFUSION (OUTPATIENT)
Dept: INFUSION THERAPY | Facility: HOSPITAL | Age: 44
End: 2024-04-24
Attending: INTERNAL MEDICINE
Payer: COMMERCIAL

## 2024-04-24 VITALS
OXYGEN SATURATION: 95 % | SYSTOLIC BLOOD PRESSURE: 136 MMHG | HEIGHT: 65 IN | DIASTOLIC BLOOD PRESSURE: 90 MMHG | BODY MASS INDEX: 42.16 KG/M2 | RESPIRATION RATE: 18 BRPM | TEMPERATURE: 97 F | HEART RATE: 81 BPM | WEIGHT: 253.06 LBS

## 2024-04-24 DIAGNOSIS — J84.9 ILD (INTERSTITIAL LUNG DISEASE): ICD-10-CM

## 2024-04-24 DIAGNOSIS — M32.13 OTHER SYSTEMIC LUPUS ERYTHEMATOSUS WITH LUNG INVOLVEMENT: ICD-10-CM

## 2024-04-24 DIAGNOSIS — M06.00 SERONEGATIVE RHEUMATOID ARTHRITIS: ICD-10-CM

## 2024-04-24 DIAGNOSIS — M35.1 MCTD (MIXED CONNECTIVE TISSUE DISEASE): ICD-10-CM

## 2024-04-24 DIAGNOSIS — M06.9 RHEUMATOID ARTHRITIS FLARE: Primary | ICD-10-CM

## 2024-04-24 DIAGNOSIS — D84.9 IMMUNOSUPPRESSED STATUS: ICD-10-CM

## 2024-04-24 DIAGNOSIS — M06.9 RHEUMATOID ARTHRITIS FLARE: ICD-10-CM

## 2024-04-24 DIAGNOSIS — Z79.899 HIGH RISK MEDICATION USE: ICD-10-CM

## 2024-04-24 LAB
ALBUMIN SERPL BCP-MCNC: 3.8 G/DL (ref 3.5–5.2)
ALP SERPL-CCNC: 78 U/L (ref 55–135)
ALT SERPL W/O P-5'-P-CCNC: 24 U/L (ref 10–44)
ANION GAP SERPL CALC-SCNC: 16 MMOL/L (ref 8–16)
ANISOCYTOSIS BLD QL SMEAR: SLIGHT
AST SERPL-CCNC: 22 U/L (ref 10–40)
B-HCG UR QL: NEGATIVE
BASOPHILS # BLD AUTO: 0.09 K/UL (ref 0–0.2)
BASOPHILS NFR BLD: 0.5 % (ref 0–1.9)
BILIRUB SERPL-MCNC: 0.2 MG/DL (ref 0.1–1)
BUN SERPL-MCNC: 13 MG/DL (ref 6–20)
CALCIUM SERPL-MCNC: 8.9 MG/DL (ref 8.7–10.5)
CHLORIDE SERPL-SCNC: 101 MMOL/L (ref 95–110)
CO2 SERPL-SCNC: 25 MMOL/L (ref 23–29)
CREAT SERPL-MCNC: 1.2 MG/DL (ref 0.5–1.4)
DACRYOCYTES BLD QL SMEAR: ABNORMAL
DIFFERENTIAL METHOD BLD: ABNORMAL
EOSINOPHIL # BLD AUTO: 0.1 K/UL (ref 0–0.5)
EOSINOPHIL NFR BLD: 0.7 % (ref 0–8)
ERYTHROCYTE [DISTWIDTH] IN BLOOD BY AUTOMATED COUNT: 21.3 % (ref 11.5–14.5)
EST. GFR  (NO RACE VARIABLE): 58 ML/MIN/1.73 M^2
GIANT PLATELETS BLD QL SMEAR: PRESENT
GLUCOSE SERPL-MCNC: 70 MG/DL (ref 70–110)
HBV SURFACE AB SER-ACNC: 113.79 MIU/ML
HBV SURFACE AB SER-ACNC: REACTIVE M[IU]/ML
HBV SURFACE AG SERPL QL IA: NORMAL
HCT VFR BLD AUTO: 32.1 % (ref 37–48.5)
HCV AB SERPL QL IA: NORMAL
HGB BLD-MCNC: 9.3 G/DL (ref 12–16)
HYPOCHROMIA BLD QL SMEAR: ABNORMAL
IMM GRANULOCYTES # BLD AUTO: 0.09 K/UL (ref 0–0.04)
IMM GRANULOCYTES NFR BLD AUTO: 0.5 % (ref 0–0.5)
LYMPHOCYTES # BLD AUTO: 4.5 K/UL (ref 1–4.8)
LYMPHOCYTES NFR BLD: 24.8 % (ref 18–48)
MCH RBC QN AUTO: 20.4 PG (ref 27–31)
MCHC RBC AUTO-ENTMCNC: 29 G/DL (ref 32–36)
MCV RBC AUTO: 70 FL (ref 82–98)
MONOCYTES # BLD AUTO: 1.5 K/UL (ref 0.3–1)
MONOCYTES NFR BLD: 8.5 % (ref 4–15)
NEUTROPHILS # BLD AUTO: 11.8 K/UL (ref 1.8–7.7)
NEUTROPHILS NFR BLD: 65 % (ref 38–73)
NRBC BLD-RTO: 0 /100 WBC
OVALOCYTES BLD QL SMEAR: ABNORMAL
PLATELET # BLD AUTO: 502 K/UL (ref 150–450)
PLATELET BLD QL SMEAR: ABNORMAL
PMV BLD AUTO: 9.4 FL (ref 9.2–12.9)
POIKILOCYTOSIS BLD QL SMEAR: SLIGHT
POLYCHROMASIA BLD QL SMEAR: ABNORMAL
POTASSIUM SERPL-SCNC: 3.5 MMOL/L (ref 3.5–5.1)
PROT SERPL-MCNC: 7.9 G/DL (ref 6–8.4)
RBC # BLD AUTO: 4.57 M/UL (ref 4–5.4)
SODIUM SERPL-SCNC: 142 MMOL/L (ref 136–145)
WBC # BLD AUTO: 18.11 K/UL (ref 3.9–12.7)

## 2024-04-24 PROCEDURE — 86706 HEP B SURFACE ANTIBODY: CPT | Performed by: INTERNAL MEDICINE

## 2024-04-24 PROCEDURE — 25000003 PHARM REV CODE 250: Performed by: INTERNAL MEDICINE

## 2024-04-24 PROCEDURE — 36415 COLL VENOUS BLD VENIPUNCTURE: CPT | Performed by: INTERNAL MEDICINE

## 2024-04-24 PROCEDURE — 96375 TX/PRO/DX INJ NEW DRUG ADDON: CPT

## 2024-04-24 PROCEDURE — 85025 COMPLETE CBC W/AUTO DIFF WBC: CPT | Performed by: INTERNAL MEDICINE

## 2024-04-24 PROCEDURE — 63600175 PHARM REV CODE 636 W HCPCS: Performed by: INTERNAL MEDICINE

## 2024-04-24 PROCEDURE — 96365 THER/PROPH/DIAG IV INF INIT: CPT

## 2024-04-24 PROCEDURE — 86803 HEPATITIS C AB TEST: CPT | Performed by: INTERNAL MEDICINE

## 2024-04-24 PROCEDURE — 80053 COMPREHEN METABOLIC PANEL: CPT | Performed by: INTERNAL MEDICINE

## 2024-04-24 PROCEDURE — 81025 URINE PREGNANCY TEST: CPT | Performed by: INTERNAL MEDICINE

## 2024-04-24 PROCEDURE — 96366 THER/PROPH/DIAG IV INF ADDON: CPT

## 2024-04-24 PROCEDURE — 87340 HEPATITIS B SURFACE AG IA: CPT | Performed by: INTERNAL MEDICINE

## 2024-04-24 RX ORDER — METHYLPREDNISOLONE SOD SUCC 125 MG
100 VIAL (EA) INJECTION
Status: COMPLETED | OUTPATIENT
Start: 2024-04-24 | End: 2024-04-24

## 2024-04-24 RX ORDER — METHYLPREDNISOLONE SOD SUCC 125 MG
100 VIAL (EA) INJECTION
Start: 2024-05-07

## 2024-04-24 RX ORDER — HEPARIN 100 UNIT/ML
500 SYRINGE INTRAVENOUS
OUTPATIENT
Start: 2024-05-07

## 2024-04-24 RX ORDER — SODIUM CHLORIDE 0.9 % (FLUSH) 0.9 %
10 SYRINGE (ML) INJECTION
OUTPATIENT
Start: 2024-05-07

## 2024-04-24 RX ORDER — DIPHENHYDRAMINE HYDROCHLORIDE 50 MG/ML
25 INJECTION INTRAMUSCULAR; INTRAVENOUS
Status: COMPLETED | OUTPATIENT
Start: 2024-04-24 | End: 2024-04-24

## 2024-04-24 RX ORDER — SODIUM CHLORIDE 0.9 % (FLUSH) 0.9 %
10 SYRINGE (ML) INJECTION
Status: DISCONTINUED | OUTPATIENT
Start: 2024-04-24 | End: 2024-04-24 | Stop reason: HOSPADM

## 2024-04-24 RX ORDER — ACETAMINOPHEN 325 MG/1
650 TABLET ORAL
OUTPATIENT
Start: 2024-05-07

## 2024-04-24 RX ORDER — FAMOTIDINE 10 MG/ML
20 INJECTION INTRAVENOUS
Status: COMPLETED | OUTPATIENT
Start: 2024-04-24 | End: 2024-04-24

## 2024-04-24 RX ORDER — FAMOTIDINE 10 MG/ML
20 INJECTION INTRAVENOUS
OUTPATIENT
Start: 2024-05-07

## 2024-04-24 RX ORDER — DIPHENHYDRAMINE HYDROCHLORIDE 50 MG/ML
25 INJECTION INTRAMUSCULAR; INTRAVENOUS
Start: 2024-05-07

## 2024-04-24 RX ORDER — ACETAMINOPHEN 325 MG/1
650 TABLET ORAL
Status: COMPLETED | OUTPATIENT
Start: 2024-04-24 | End: 2024-04-24

## 2024-04-24 RX ADMIN — FAMOTIDINE 20 MG: 10 INJECTION INTRAVENOUS at 08:04

## 2024-04-24 RX ADMIN — METHYLPREDNISOLONE SODIUM SUCCINATE 100 MG: 125 INJECTION, POWDER, FOR SOLUTION INTRAMUSCULAR; INTRAVENOUS at 08:04

## 2024-04-24 RX ADMIN — RITUXIMAB 1000 MG: 10 INJECTION, SOLUTION INTRAVENOUS at 09:04

## 2024-04-24 RX ADMIN — DIPHENHYDRAMINE HYDROCHLORIDE 25 MG: 50 INJECTION INTRAMUSCULAR; INTRAVENOUS at 08:04

## 2024-04-24 RX ADMIN — ACETAMINOPHEN 650 MG: 325 TABLET ORAL at 08:04

## 2024-04-24 RX ADMIN — SODIUM CHLORIDE: 9 INJECTION, SOLUTION INTRAVENOUS at 08:04

## 2024-04-24 NOTE — PLAN OF CARE
Problem: Adult Inpatient Plan of Care  Goal: Plan of Care Review  Outcome: Progressing  Flowsheets (Taken 4/24/2024 0838)  Plan of Care Reviewed With: patient  Goal: Patient-Specific Goal (Individualized)  Outcome: Progressing  Flowsheets (Taken 4/24/2024 0838)  Individualized Care Needs: feet elevated, warm blanket provided  Anxieties, Fears or Concerns: denies  Goal: Absence of Hospital-Acquired Illness or Injury  Outcome: Progressing  Intervention: Identify and Manage Fall Risk  Flowsheets (Taken 4/24/2024 0838)  Safety Promotion/Fall Prevention:   assistive device/personal item within reach   Fall Risk reviewed with patient/family   lighting adjusted   in recliner, wheels locked   medications reviewed   instructed to call staff for mobility  Intervention: Prevent Infection  Flowsheets (Taken 4/24/2024 0838)  Infection Prevention:   environmental surveillance performed   equipment surfaces disinfected   hand hygiene promoted   personal protective equipment utilized  Goal: Optimal Comfort and Wellbeing  Outcome: Progressing  Intervention: Monitor Pain and Promote Comfort  Flowsheets (Taken 4/24/2024 0838)  Pain Management Interventions:   position adjusted   quiet environment facilitated   relaxation techniques promoted   warm blanket provided  Intervention: Provide Person-Centered Care  Flowsheets (Taken 4/24/2024 0838)  Trust Relationship/Rapport:   care explained   reassurance provided   choices provided   thoughts/feelings acknowledged   emotional support provided   empathic listening provided   questions answered   questions encouraged     Problem: Infection  Goal: Absence of Infection Signs and Symptoms  Outcome: Progressing  Intervention: Prevent or Manage Infection  Flowsheets (Taken 4/24/2024 0838)  Infection Management: aseptic technique maintained

## 2024-05-16 ENCOUNTER — PATIENT MESSAGE (OUTPATIENT)
Dept: CARDIOLOGY | Facility: HOSPITAL | Age: 44
End: 2024-05-16
Payer: COMMERCIAL

## 2024-05-20 ENCOUNTER — DOCUMENTATION ONLY (OUTPATIENT)
Dept: INFUSION THERAPY | Facility: HOSPITAL | Age: 44
End: 2024-05-20
Payer: COMMERCIAL

## 2024-05-23 ENCOUNTER — PATIENT MESSAGE (OUTPATIENT)
Dept: CARDIOLOGY | Facility: HOSPITAL | Age: 44
End: 2024-05-23
Payer: COMMERCIAL

## 2024-05-29 ENCOUNTER — TELEPHONE (OUTPATIENT)
Dept: RHEUMATOLOGY | Facility: CLINIC | Age: 44
End: 2024-05-29
Payer: COMMERCIAL

## 2024-06-03 ENCOUNTER — HOSPITAL ENCOUNTER (OUTPATIENT)
Dept: CARDIOLOGY | Facility: HOSPITAL | Age: 44
Discharge: HOME | End: 2024-06-03
Attending: INTERNAL MEDICINE
Payer: COMMERCIAL

## 2024-06-03 VITALS
SYSTOLIC BLOOD PRESSURE: 136 MMHG | WEIGHT: 253 LBS | BODY MASS INDEX: 42.15 KG/M2 | DIASTOLIC BLOOD PRESSURE: 90 MMHG | HEIGHT: 65 IN

## 2024-06-03 DIAGNOSIS — R06.09 DYSPNEA ON EXERTION: ICD-10-CM

## 2024-06-03 DIAGNOSIS — G47.30 SLEEP APNEA, UNSPECIFIED TYPE: ICD-10-CM

## 2024-06-03 DIAGNOSIS — R06.09 DOE (DYSPNEA ON EXERTION): ICD-10-CM

## 2024-06-03 DIAGNOSIS — Z86.16 HISTORY OF COVID-19: ICD-10-CM

## 2024-06-03 DIAGNOSIS — Z86.73 HISTORY OF TIA (TRANSIENT ISCHEMIC ATTACK): ICD-10-CM

## 2024-06-03 DIAGNOSIS — G47.33 OSA (OBSTRUCTIVE SLEEP APNEA): ICD-10-CM

## 2024-06-03 DIAGNOSIS — F90.9 ATTENTION DEFICIT HYPERACTIVITY DISORDER (ADHD), UNSPECIFIED ADHD TYPE: ICD-10-CM

## 2024-06-03 DIAGNOSIS — E11.69 TYPE 2 DIABETES MELLITUS WITH OTHER SPECIFIED COMPLICATION, WITHOUT LONG-TERM CURRENT USE OF INSULIN: ICD-10-CM

## 2024-06-03 DIAGNOSIS — E78.2 MIXED HYPERLIPIDEMIA: ICD-10-CM

## 2024-06-03 LAB
AORTIC ROOT ANNULUS: 3.02 CM
ASCENDING AORTA: 2.48 CM
AV INDEX (PROSTH): 0.98
AV MEAN GRADIENT: 4 MMHG
AV PEAK GRADIENT: 7 MMHG
AV VALVE AREA BY VELOCITY RATIO: 2.55 CM²
AV VALVE AREA: 3.1 CM²
AV VELOCITY RATIO: 0.8
BSA FOR ECHO PROCEDURE: 2.29 M2
CV ECHO LV RWT: 0.57 CM
DOP CALC AO PEAK VEL: 1.32 M/S
DOP CALC AO VTI: 21.1 CM
DOP CALC LVOT AREA: 3.2 CM2
DOP CALC LVOT DIAMETER: 2.01 CM
DOP CALC LVOT PEAK VEL: 1.06 M/S
DOP CALC LVOT STROKE VOLUME: 65.33 CM3
DOP CALC RVOT PEAK VEL: 0.63 M/S
DOP CALC RVOT VTI: 11.2 CM
DOP CALCLVOT PEAK VEL VTI: 20.6 CM
E WAVE DECELERATION TIME: 200.87 MSEC
E/A RATIO: 0.76
E/E' RATIO: 7.47 M/S
ECHO LV POSTERIOR WALL: 1.16 CM (ref 0.6–1.1)
FRACTIONAL SHORTENING: 39 % (ref 28–44)
INTERVENTRICULAR SEPTUM: 1.47 CM (ref 0.6–1.1)
IVRT: 82.78 MSEC
LA MAJOR: 5.04 CM
LA MINOR: 5.33 CM
LA WIDTH: 3.4 CM
LEFT ATRIUM SIZE: 3.83 CM
LEFT ATRIUM VOLUME INDEX MOD: 21.7 ML/M2
LEFT ATRIUM VOLUME INDEX: 26.2 ML/M2
LEFT ATRIUM VOLUME MOD: 47.43 CM3
LEFT ATRIUM VOLUME: 57.35 CM3
LEFT INTERNAL DIMENSION IN SYSTOLE: 2.46 CM (ref 2.1–4)
LEFT VENTRICLE DIASTOLIC VOLUME INDEX: 32.74 ML/M2
LEFT VENTRICLE DIASTOLIC VOLUME: 71.69 ML
LEFT VENTRICLE MASS INDEX: 88 G/M2
LEFT VENTRICLE SYSTOLIC VOLUME INDEX: 9.8 ML/M2
LEFT VENTRICLE SYSTOLIC VOLUME: 21.53 ML
LEFT VENTRICULAR INTERNAL DIMENSION IN DIASTOLE: 4.04 CM (ref 3.5–6)
LEFT VENTRICULAR MASS: 192.63 G
LV LATERAL E/E' RATIO: 6.22 M/S
LV SEPTAL E/E' RATIO: 9.33 M/S
LVOT MG: 2.49 MMHG
LVOT MV: 0.75 CM/S
MV PEAK A VEL: 0.74 M/S
MV PEAK E VEL: 0.56 M/S
MV STENOSIS PRESSURE HALF TIME: 58.25 MS
MV VALVE AREA P 1/2 METHOD: 3.78 CM2
OHS CV RV/LV RATIO: 0.78 CM
PISA TR MAX VEL: 3 M/S
PV MEAN GRADIENT: 1 MMHG
PV PEAK GRADIENT: 3 MMHG
PV PEAK VELOCITY: 0.81 M/S
RA MAJOR: 4.37 CM
RA PRESSURE ESTIMATED: 3 MMHG
RA WIDTH: 3.41 CM
RIGHT VENTRICULAR END-DIASTOLIC DIMENSION: 3.14 CM
RV TB RVSP: 6 MMHG
SINUS: 2.89 CM
STJ: 2.43 CM
TDI LATERAL: 0.09 M/S
TDI SEPTAL: 0.06 M/S
TDI: 0.08 M/S
TR MAX PG: 36 MMHG
TRICUSPID ANNULAR PLANE SYSTOLIC EXCURSION: 1.69 CM
TV REST PULMONARY ARTERY PRESSURE: 39 MMHG
Z-SCORE OF LEFT VENTRICULAR DIMENSION IN END DIASTOLE: -6.05
Z-SCORE OF LEFT VENTRICULAR DIMENSION IN END SYSTOLE: -4.77

## 2024-06-03 PROCEDURE — 93306 TTE W/DOPPLER COMPLETE: CPT

## 2024-06-03 PROCEDURE — 93306 TTE W/DOPPLER COMPLETE: CPT | Mod: 26,,, | Performed by: INTERNAL MEDICINE

## 2024-06-03 PROCEDURE — 93244 EXT ECG>48HR<7D REV&INTERPJ: CPT | Mod: ,,, | Performed by: INTERNAL MEDICINE

## 2024-06-11 ENCOUNTER — PATIENT MESSAGE (OUTPATIENT)
Dept: INFUSION THERAPY | Facility: HOSPITAL | Age: 44
End: 2024-06-11
Payer: COMMERCIAL

## 2024-06-11 ENCOUNTER — OFFICE VISIT (OUTPATIENT)
Dept: CARDIOLOGY | Facility: CLINIC | Age: 44
End: 2024-06-11
Payer: COMMERCIAL

## 2024-06-11 ENCOUNTER — PATIENT MESSAGE (OUTPATIENT)
Dept: RHEUMATOLOGY | Facility: CLINIC | Age: 44
End: 2024-06-11
Payer: COMMERCIAL

## 2024-06-11 VITALS
DIASTOLIC BLOOD PRESSURE: 80 MMHG | OXYGEN SATURATION: 96 % | WEIGHT: 250 LBS | BODY MASS INDEX: 41.65 KG/M2 | SYSTOLIC BLOOD PRESSURE: 130 MMHG | HEART RATE: 117 BPM | HEIGHT: 65 IN

## 2024-06-11 DIAGNOSIS — G47.33 OSA (OBSTRUCTIVE SLEEP APNEA): ICD-10-CM

## 2024-06-11 DIAGNOSIS — J84.9 ILD (INTERSTITIAL LUNG DISEASE): ICD-10-CM

## 2024-06-11 DIAGNOSIS — Z86.16 HISTORY OF COVID-19: ICD-10-CM

## 2024-06-11 DIAGNOSIS — R06.09 DOE (DYSPNEA ON EXERTION): Primary | ICD-10-CM

## 2024-06-11 DIAGNOSIS — F90.9 ATTENTION DEFICIT HYPERACTIVITY DISORDER (ADHD), UNSPECIFIED ADHD TYPE: ICD-10-CM

## 2024-06-11 DIAGNOSIS — R60.0 LOCALIZED EDEMA: ICD-10-CM

## 2024-06-11 DIAGNOSIS — Z86.73 HISTORY OF TIA (TRANSIENT ISCHEMIC ATTACK): ICD-10-CM

## 2024-06-11 DIAGNOSIS — G47.30 SLEEP APNEA, UNSPECIFIED TYPE: ICD-10-CM

## 2024-06-11 DIAGNOSIS — E78.2 MIXED HYPERLIPIDEMIA: ICD-10-CM

## 2024-06-11 DIAGNOSIS — E11.69 TYPE 2 DIABETES MELLITUS WITH OTHER SPECIFIED COMPLICATION, WITHOUT LONG-TERM CURRENT USE OF INSULIN: ICD-10-CM

## 2024-06-11 DIAGNOSIS — R68.89 CUSHINGOID FACIES: Primary | ICD-10-CM

## 2024-06-11 DIAGNOSIS — I27.20 PULMONARY HYPERTENSION: ICD-10-CM

## 2024-06-11 DIAGNOSIS — M32.13 OTHER SYSTEMIC LUPUS ERYTHEMATOSUS WITH LUNG INVOLVEMENT: ICD-10-CM

## 2024-06-11 DIAGNOSIS — R06.09 DYSPNEA ON EXERTION: ICD-10-CM

## 2024-06-11 DIAGNOSIS — R00.2 PALPITATION: ICD-10-CM

## 2024-06-11 PROCEDURE — 3079F DIAST BP 80-89 MM HG: CPT | Mod: CPTII,S$GLB,, | Performed by: INTERNAL MEDICINE

## 2024-06-11 PROCEDURE — 1160F RVW MEDS BY RX/DR IN RCRD: CPT | Mod: CPTII,S$GLB,, | Performed by: INTERNAL MEDICINE

## 2024-06-11 PROCEDURE — G2211 COMPLEX E/M VISIT ADD ON: HCPCS | Mod: S$GLB,,, | Performed by: INTERNAL MEDICINE

## 2024-06-11 PROCEDURE — 1159F MED LIST DOCD IN RCRD: CPT | Mod: CPTII,S$GLB,, | Performed by: INTERNAL MEDICINE

## 2024-06-11 PROCEDURE — 3075F SYST BP GE 130 - 139MM HG: CPT | Mod: CPTII,S$GLB,, | Performed by: INTERNAL MEDICINE

## 2024-06-11 PROCEDURE — 99214 OFFICE O/P EST MOD 30 MIN: CPT | Mod: S$GLB,,, | Performed by: INTERNAL MEDICINE

## 2024-06-11 PROCEDURE — 99999 PR PBB SHADOW E&M-EST. PATIENT-LVL V: CPT | Mod: PBBFAC,,, | Performed by: INTERNAL MEDICINE

## 2024-06-11 PROCEDURE — 3008F BODY MASS INDEX DOCD: CPT | Mod: CPTII,S$GLB,, | Performed by: INTERNAL MEDICINE

## 2024-06-11 RX ORDER — NYSTATIN 100000 [USP'U]/ML
5 SUSPENSION ORAL 4 TIMES DAILY
COMMUNITY
Start: 2024-04-09

## 2024-06-11 RX ORDER — SEMAGLUTIDE 0.25 MG/.5ML
0.25 INJECTION, SOLUTION SUBCUTANEOUS
Qty: 3 ML | Refills: 1 | Status: SHIPPED | OUTPATIENT
Start: 2024-06-11

## 2024-06-11 NOTE — TELEPHONE ENCOUNTER
Pt gets RTX x 2 doses q 4 months. She completed RTX 1 of 2 on 4/24 but missed the second dose of the cycle on 5/08 due to fever and aches. She is calling today to r/s. Denies any symptoms of infection/illness. She is scheduled for RTX 2 of 2 on 6/25 with a cmp, cbc, Immunoglobulins, and UPT a week prior. Do you want her next cycle 4 months from 6/25? thanks

## 2024-06-11 NOTE — PROGRESS NOTES
Subjective:   Patient ID:  Daija Luna is a 44 y.o. female who presents for cardiac consult of No chief complaint on file.      The patient came in today for cardiac consult of No chief complaint on file.    Daija Luna is a 44 y.o. female pt with HTN, ADHD, obesity, Hypothyroidism, TIA - 2018, SLE, depression, ILD, h/o COVID 19 presents for follow up CV eval.         3/14/24  BP and HR stable. BMI 43 - 259 lbs. Lipids last year worse - Tc 303, . Tg 130.   Pt is more concerned about her heart lately - pt is more SOB, she has been to [facility] HTN clinic - trying to see heart vs lung causing issues.   She went to [facility] and was non compliant - She was started Tivaso    From [facility] HTN clinic -   presents today after right heart catheterization was performed, results as noted above.  Has mild pulmonary hypertension, and based on pulmonary function testing, appears to be primarily related to his interstitial lung disease.  Findings most consistent with who group 3 pulmonary hypertension.  Functional class 2-3 symptoms.  - appears euvolemic on exam today.  - will start Tyvaso.  Met with PH coordinators upon completion of visit.  - Continue FU with sleep medicine, pulmonology, and rheumatology    6/11/24  Bp stable. HR elevated. BMI 41 - 250 lbs   ECHO 6/2024 with normal bi V function, PASP 39 mmHg. Small peric effusion.     Patient has fairly good exercise tolerance. 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    FH - father - lung CA    Results for orders placed during the hospital encounter of 06/03/24    Echo    Interpretation Summary    Left Ventricle: The left ventricle is normal in size. Ventricular mass is normal. Mildly increased wall thickness. There is concentric remodeling. There is normal systolic function with a visually estimated ejection fraction of 55 - 60%. There is normal diastolic function.    Right Ventricle: Normal right ventricular cavity size. Wall thickness  is normal. Systolic function is normal.    Pulmonary Artery: The estimated pulmonary artery systolic pressure is 39 mmHg.    IVC/SVC: Normal venous pressure at 3 mmHg.    Pericardium: There is a small effusion. No indication of cardiac tamponade.        Conclusion 10/2021      Normal myocardial perfusion scan. There is no evidence of myocardial ischemia or infarction.    There is a mild to moderate intensity defect in the anterior wall of the left ventricle, secondary to breast attenuation.    The gated perfusion images showed an ejection fraction of 76% at rest. The gated perfusion images showed an ejection fraction of 65% post stress.    The EKG portion of this study is negative for ischemia.    The patient reported no chest pain during the stress test.    There were no arrhythmias during stress.    Conclusion 10/2021    There were rare hookup related artifacts. Overall, the study was of good quality. The tape was adequate (2 days , 48 hours, 0 minutes).  Sinus rhythm with heart rates varying between 80 and 145 BPM with an average of 96 BPM.  There were PVCs totalling 0 and averaging 0 per hour.  There were PACs totalling 0 and averaging 0 per hour.    Conclusion 10/2021    There is no evidence of a right lower extremity DVT.  There is no evidence of a left lower extremity DVT.    Results for orders placed during the hospital encounter of 06/02/21    Echo Color Flow Doppler? Yes    Interpretation Summary  · The left ventricle is normal in size with moderate concentric hypertrophy and normal systolic function.  · The estimated ejection fraction is 55%.  · Normal left ventricular diastolic function.  · Mild mitral regurgitation.  · Trivial posterior pericardial effusion.  · Normal central venous pressure (3 mmHg).  · The estimated PA systolic pressure is 34 mmHg.  · Normal right ventricular size with normal right ventricular systolic function.      Normal sinus rhythm   Right bundle branch block   Abnormal ECG   No  previous ECGs available   Confirmed by KAISER WHYTE MD ([id number]) on 6/2/2021 11:22:29 PM     Past Medical History:   Diagnosis Date    ADHD (attention deficit hyperactivity disorder)     Asthma     Hypertension     Hypothyroidism     Mixed restrictive and obstructive lung disease     Pulmonary hypertension     Rheumatoid arthritis flare 06/22/2021    TIA (transient ischemic attack)        Past Surgical History:   Procedure Laterality Date    BRONCHOSCOPY Bilateral 8/16/2019    Procedure: Bronchoscopy;  Surgeon: Virgilio Weiss MD;  Location: [facility] ENDO;  Service: Endoscopy;  Laterality: Bilateral;    RIGHT HEART CATHETERIZATION Right 7/5/2023    Procedure: INSERTION, CATHETER, RIGHT HEART;  Surgeon: Kavon Kunz MD;  Location: [facility] CATH LAB;  Service: Cardiology;  Laterality: Right;       Social History     Tobacco Use    Smoking status: Never    Smokeless tobacco: Never   Substance Use Topics    Alcohol use: Not Currently     Comment: social    Drug use: No       Family History   Problem Relation Name Age of Onset    Cancer Maternal Aunt          Breast, Back, Lung Cancer    Cancer Father         Patient's Medications   New Prescriptions    No medications on file   Previous Medications    ALBUTEROL 90 MCG/ACTUATION INHALER    Inhale 2 puffs into the lungs every 6 (six) hours as needed for Wheezing.    ALBUTEROL-IPRATROPIUM (DUO-NEB) 2.5 MG-0.5 MG/3 ML NEBULIZER SOLUTION    INHALE 1 VIAL PER NEUBULIZER NEBULIZER EVERY 4 HOURS    ATORVASTATIN (LIPITOR) 20 MG TABLET    Take 1 tablet (20 mg total) by mouth every evening.    BENZONATATE (TESSALON) 200 MG CAPSULE    Take 200 mg by mouth 3 (three) times daily as needed.    BUMETANIDE (BUMEX) 1 MG TABLET    Take 1 tablet (1 mg total) by mouth 2 (two) times daily.    DEXTROAMPHETAMINE-AMPHETAMINE (ADDERALL) 15 MG TABLET        FLUTICASONE (FLONASE) 50 MCG/ACTUATION NASAL SPRAY    2 sprays (100 mcg total) by Each Nare route once daily.    FLUTICASONE-UMECLIDIN-VILANTER  (TRELEGY ELLIPTA) 200-62.5-25 MCG INHALER    Inhale 1 puff into the lungs.    FOLIC ACID (FOLVITE) 1 MG TABLET    Take 1,000 mcg by mouth once daily.    HYDROXYCHLOROQUINE (PLAQUENIL) 200 MG TABLET    Take 2 tablets by mouth once daily.    HYDROXYCHLOROQUINE (PLAQUENIL) 200 MG TABLET    Take 1 tablet (200 mg total) by mouth 2 (two) times daily.    MAGNESIUM OXIDE (MAG-OX) 400 MG (241.3 MG MAGNESIUM) TABLET    Take 1 tablet (400 mg total) by mouth once daily.    MONTELUKAST (SINGULAIR) 10 MG TABLET    SMARTSI Tablet(s) By Mouth Every Evening    NINTEDANIB (OFEV) 150 MG CAP    Take 1 capsule (150 mg total) by mouth every 12 (twelve) hours.    NYSTATIN (MYCOSTATIN) 100,000 UNIT/ML SUSPENSION    Take 5 mLs by mouth 4 (four) times daily.    POTASSIUM CHLORIDE SA (K-DUR,KLOR-CON) 20 MEQ TABLET    Take 1 tablet (20 mEq total) by mouth once daily.    PREDNISONE (DELTASONE) 20 MG TABLET    Take by mouth.    PROMETHAZINE (PHENERGAN) 6.25 MG/5 ML SYRUP    SMARTSI.5-10 Milliliter(s) By Mouth 3 Times Daily    QUETIAPINE (SEROQUEL) 50 MG TABLET    Take 50 mg by mouth every evening.    SEMAGLUTIDE (OZEMPIC) 0.25 MG OR 0.5 MG(2 MG/1.5 ML) PEN INJECTOR    Inject 0.25 mg into the skin every 7 days.    SULFAMETHOXAZOLE-TRIMETHOPRIM 800-160MG (BACTRIM DS) 800-160 MG TAB    TAKE 1 TABLET BY MOUTH THREE TIMES WEEKLY    VILAZODONE (VIIBRYD) 10 MG (7)- 20 MG (23) DSPK       Modified Medications    No medications on file   Discontinued Medications    No medications on file       Review of Systems   Constitutional:  Positive for malaise/fatigue.   HENT: Negative.     Eyes: Negative.    Respiratory:  Positive for shortness of breath.    Cardiovascular:  Positive for palpitations and leg swelling.   Gastrointestinal: Negative.    Genitourinary: Negative.    Musculoskeletal: Negative.    Skin: Negative.    Neurological: Negative.    Endo/Heme/Allergies: Negative.    Psychiatric/Behavioral: Negative.     All 12 systems otherwise  "negative.      Wt Readings from Last 3 Encounters:   06/11/24 113.4 kg (250 lb)   06/03/24 114.8 kg (253 lb)   04/24/24 114.8 kg (253 lb 1.4 oz)     Temp Readings from Last 3 Encounters:   04/24/24 96.9 °F (36.1 °C)   12/27/23 98 °F (36.7 °C)   12/13/23 97.3 °F (36.3 °C)     BP Readings from Last 3 Encounters:   06/11/24 130/80   06/03/24 (!) 136/90   04/24/24 (!) 136/90     Pulse Readings from Last 3 Encounters:   06/11/24 (!) 117   04/24/24 81   03/14/24 105       /80 (BP Location: Right arm, Patient Position: Sitting, BP Method: Large (Manual))   Pulse (!) 117   Ht 5' 5" (1.651 m)   Wt 113.4 kg (250 lb)   SpO2 96%   BMI 41.60 kg/m²     Objective:   Physical Exam  Vitals and nursing note reviewed.   Constitutional:       General: She is not in acute distress.     Appearance: She is well-developed. She is obese. She is not diaphoretic.   HENT:      Head: Normocephalic and atraumatic.      Nose: Nose normal.      Mouth/Throat:      Pharynx: No oropharyngeal exudate.   Eyes:      General: No scleral icterus.        Right eye: No discharge.         Left eye: No discharge.      Conjunctiva/sclera: Conjunctivae normal.   Neck:      Thyroid: No thyromegaly.      Vascular: No JVD.   Cardiovascular:      Rate and Rhythm: Normal rate and regular rhythm.      Heart sounds: S1 normal and S2 normal. Murmur heard.      No friction rub. No gallop. No S3 or S4 sounds.   Pulmonary:      Effort: Pulmonary effort is normal. No respiratory distress.      Breath sounds: Normal breath sounds. No stridor. No wheezing or rales.   Chest:      Chest wall: No tenderness.   Abdominal:      General: Bowel sounds are normal. There is no distension.      Palpations: Abdomen is soft. There is no mass.      Tenderness: There is no abdominal tenderness. There is no rebound.   Genitourinary:     Comments: Deferred  Musculoskeletal:         General: No tenderness or deformity. Normal range of motion.      Cervical back: Normal range of " motion and neck supple.   Lymphadenopathy:      Cervical: No cervical adenopathy.   Skin:     General: Skin is warm and dry.      Coloration: Skin is not pale.      Findings: No erythema or rash.   Neurological:      Mental Status: She is alert and oriented to person, place, and time.      Motor: No abnormal muscle tone.      Coordination: Coordination normal.   Psychiatric:         Behavior: Behavior normal.         Thought Content: Thought content normal.         Judgment: Judgment normal.         Lab Results   Component Value Date     04/24/2024    K 3.5 04/24/2024     04/24/2024    CO2 25 04/24/2024    BUN 13 04/24/2024    CREATININE 1.2 04/24/2024    GLU 70 04/24/2024    HGBA1C 6.1 (H) 04/27/2023    MG 2.1 05/22/2023    AST 22 04/24/2024    ALT 24 04/24/2024    ALBUMIN 3.8 04/24/2024    PROT 7.9 04/24/2024    BILITOT 0.2 04/24/2024    WBC 18.11 (H) 04/24/2024    HGB 9.3 (L) 04/24/2024    HCT 32.1 (L) 04/24/2024    MCV 70 (L) 04/24/2024     (H) 04/24/2024    INR 1.0 07/05/2023    TSH 1.687 10/12/2021    CHOL 307 (H) 03/14/2024    HDL 67 03/14/2024    LDLCALC 217.8 (H) 03/14/2024    LDLCALC 220 (H) 07/25/2019    TRIG 111 03/14/2024    BNP 13 03/14/2024     Assessment:      1. BRADY (dyspnea on exertion)    2. Pulmonary hypertension    3. Other systemic lupus erythematosus with lung involvement    4. Mixed hyperlipidemia    5. Sleep apnea, unspecified type    6. Dyspnea on exertion    7. History of TIA (transient ischemic attack)    8. History of COVID-19    9. Attention deficit hyperactivity disorder (ADHD), unspecified ADHD type    10. BMI 40.0-44.9, adult    11. Type 2 diabetes mellitus with other specified complication, without long-term current use of insulin    12. EMELIA (obstructive sleep apnea)    13. Localized edema    14. ILD (interstitial lung disease)    15. Palpitation          Plan:   1. HTN with edema  - titrate meds  - LE u/s neg for DVT 10/2021.   - rec compression stockings    2.  HLD with elevated TGs  - uncontrolled   - cont meds  - low minerva diet  - Lipids last year worse - Tc 303, . Tg 130.   - started Lipitor 20mg     3. ILD with restrictive and obstructive lung disease  - cont tx per pulm    4. SLE  - cont tx per PCP/rheum    5. H/O TIA  - rec asa, statin  - was on statin in past but caused interaction with lupus meds  - f/u neuro  - add zetia    6. BRADY, palpitations, h/o PVCs; h/o COVID 19  - was admitted to [facility]  -had   - Pulm HTN - discussed RHC if symptoms are worse,  referral to PHTN clinic  - r/o EMELIA   - Prior Holter inconclusive as pt did not wear it more than a few hours.   - does not want to take verapamil with Ofev due to potential side effects   - ECHO 6/2024 with normal bi V function, PASP 39 mmHg. Small peric effusion.   - pharm nuclear stress and 7 day vital - pending     7. ADHD  - cont tx if needed - stimulants     8. Obesity, BMI 44 - 266 lbs, PreDM -->  BMI 43 - 259 lbs --> BMI 41 - 250 lbs   - cont weight loss     9. DM2, PreDm  - start Wegovy      Visit today included increased complexity associated with the care of the episodic problem TIA addressed and managing the longitudinal care of the patient due to the serious and/or complex managed problem(s) .      Thank you for allowing me to participate in this patient's care. Please do not hesitate to contact me with any questions or concerns. Consult note has been forwarded to the referral physician.

## 2024-06-12 ENCOUNTER — PATIENT MESSAGE (OUTPATIENT)
Dept: CARDIOLOGY | Facility: CLINIC | Age: 44
End: 2024-06-12
Payer: COMMERCIAL

## 2024-06-12 ENCOUNTER — TELEPHONE (OUTPATIENT)
Dept: TRANSPLANT | Facility: CLINIC | Age: 44
End: 2024-06-12
Payer: COMMERCIAL

## 2024-06-12 ENCOUNTER — TELEPHONE (OUTPATIENT)
Dept: RHEUMATOLOGY | Facility: CLINIC | Age: 44
End: 2024-06-12
Payer: COMMERCIAL

## 2024-06-12 DIAGNOSIS — Z79.899 POLYPHARMACY: Primary | ICD-10-CM

## 2024-06-12 DIAGNOSIS — R06.82 TACHYPNEA: ICD-10-CM

## 2024-06-12 DIAGNOSIS — I27.9 CHRONIC PULMONARY HEART DISEASE: ICD-10-CM

## 2024-06-12 LAB
OHS CV HOLTER SINUS AVERAGE HR: 94
OHS CV HOLTER SINUS MAX HR: 150
OHS CV HOLTER SINUS MIN HR: 65

## 2024-06-12 NOTE — TELEPHONE ENCOUNTER
"----- Message from Lois Maria RN sent at 2024  9:17 AM CDT -----  Regarding: RTX orders  Please review RTX med in the therapy plan orders and sign. It previously stated "every visit" but changed to every 17 weeks, 2 doses q 14 days.  This was to satisfy pre service and her insurance co. Needed a specific frequency.   Pt has been no showing and cancelling her appts and when she r/s the auth is . Hopefully they will authorize more visits with in a given year. thankis  "

## 2024-06-17 ENCOUNTER — TELEPHONE (OUTPATIENT)
Dept: RHEUMATOLOGY | Facility: CLINIC | Age: 44
End: 2024-06-17
Payer: COMMERCIAL

## 2024-06-18 ENCOUNTER — TELEPHONE (OUTPATIENT)
Dept: CARDIOLOGY | Facility: CLINIC | Age: 44
End: 2024-06-18
Payer: COMMERCIAL

## 2024-06-18 DIAGNOSIS — Z79.899 POLYPHARMACY: Primary | ICD-10-CM

## 2024-06-18 DIAGNOSIS — I27.9 CHRONIC PULMONARY HEART DISEASE: ICD-10-CM

## 2024-06-18 DIAGNOSIS — R06.02 SHORTNESS OF BREATH: ICD-10-CM

## 2024-06-18 DIAGNOSIS — R06.82 TACHYPNEA: ICD-10-CM

## 2024-06-18 NOTE — TELEPHONE ENCOUNTER
Left voice message informing patient PA for Wegovy has been initiated. She will receive a call with insurance's decision within 72 hours from today.

## 2024-06-20 ENCOUNTER — LAB VISIT (OUTPATIENT)
Dept: LAB | Facility: HOSPITAL | Age: 44
End: 2024-06-20
Attending: INTERNAL MEDICINE
Payer: COMMERCIAL

## 2024-06-20 DIAGNOSIS — J84.9 ILD (INTERSTITIAL LUNG DISEASE): ICD-10-CM

## 2024-06-20 DIAGNOSIS — M06.00 SERONEGATIVE RHEUMATOID ARTHRITIS: ICD-10-CM

## 2024-06-20 DIAGNOSIS — M35.1 MCTD (MIXED CONNECTIVE TISSUE DISEASE): ICD-10-CM

## 2024-06-20 LAB
ALBUMIN SERPL BCP-MCNC: 3.7 G/DL (ref 3.5–5.2)
ALP SERPL-CCNC: 97 U/L (ref 55–135)
ALT SERPL W/O P-5'-P-CCNC: 43 U/L (ref 10–44)
ANION GAP SERPL CALC-SCNC: 10 MMOL/L (ref 8–16)
ANISOCYTOSIS BLD QL SMEAR: SLIGHT
AST SERPL-CCNC: 45 U/L (ref 10–40)
BASOPHILS # BLD AUTO: 0.1 K/UL (ref 0–0.2)
BASOPHILS NFR BLD: 0.7 % (ref 0–1.9)
BILIRUB SERPL-MCNC: 0.7 MG/DL (ref 0.1–1)
BUN SERPL-MCNC: 13 MG/DL (ref 6–20)
CALCIUM SERPL-MCNC: 9.2 MG/DL (ref 8.7–10.5)
CHLORIDE SERPL-SCNC: 104 MMOL/L (ref 95–110)
CO2 SERPL-SCNC: 25 MMOL/L (ref 23–29)
CREAT SERPL-MCNC: 1.2 MG/DL (ref 0.5–1.4)
DIFFERENTIAL METHOD BLD: ABNORMAL
EOSINOPHIL # BLD AUTO: 0.4 K/UL (ref 0–0.5)
EOSINOPHIL NFR BLD: 2.7 % (ref 0–8)
ERYTHROCYTE [DISTWIDTH] IN BLOOD BY AUTOMATED COUNT: 23.9 % (ref 11.5–14.5)
EST. GFR  (NO RACE VARIABLE): 58 ML/MIN/1.73 M^2
GLUCOSE SERPL-MCNC: 87 MG/DL (ref 70–110)
HCT VFR BLD AUTO: 33.1 % (ref 37–48.5)
HGB BLD-MCNC: 9.2 G/DL (ref 12–16)
HYPOCHROMIA BLD QL SMEAR: ABNORMAL
IGA SERPL-MCNC: 458 MG/DL (ref 40–350)
IGG SERPL-MCNC: 972 MG/DL (ref 650–1600)
IGM SERPL-MCNC: 31 MG/DL (ref 50–300)
IMM GRANULOCYTES # BLD AUTO: 0.15 K/UL (ref 0–0.04)
IMM GRANULOCYTES NFR BLD AUTO: 1.1 % (ref 0–0.5)
LYMPHOCYTES # BLD AUTO: 3 K/UL (ref 1–4.8)
LYMPHOCYTES NFR BLD: 21.5 % (ref 18–48)
MCH RBC QN AUTO: 20.1 PG (ref 27–31)
MCHC RBC AUTO-ENTMCNC: 27.8 G/DL (ref 32–36)
MCV RBC AUTO: 72 FL (ref 82–98)
MONOCYTES # BLD AUTO: 1.3 K/UL (ref 0.3–1)
MONOCYTES NFR BLD: 9.3 % (ref 4–15)
NEUTROPHILS # BLD AUTO: 9 K/UL (ref 1.8–7.7)
NEUTROPHILS NFR BLD: 64.7 % (ref 38–73)
NRBC BLD-RTO: 0 /100 WBC
PLATELET # BLD AUTO: 472 K/UL (ref 150–450)
PLATELET BLD QL SMEAR: ABNORMAL
PMV BLD AUTO: 9.7 FL (ref 9.2–12.9)
POIKILOCYTOSIS BLD QL SMEAR: SLIGHT
POTASSIUM SERPL-SCNC: 3.8 MMOL/L (ref 3.5–5.1)
PROT SERPL-MCNC: 7.4 G/DL (ref 6–8.4)
RBC # BLD AUTO: 4.58 M/UL (ref 4–5.4)
SODIUM SERPL-SCNC: 139 MMOL/L (ref 136–145)
WBC # BLD AUTO: 13.9 K/UL (ref 3.9–12.7)

## 2024-06-20 PROCEDURE — 80053 COMPREHEN METABOLIC PANEL: CPT | Performed by: INTERNAL MEDICINE

## 2024-06-20 PROCEDURE — 85025 COMPLETE CBC W/AUTO DIFF WBC: CPT | Performed by: INTERNAL MEDICINE

## 2024-06-20 PROCEDURE — 36415 COLL VENOUS BLD VENIPUNCTURE: CPT | Mod: PN | Performed by: INTERNAL MEDICINE

## 2024-06-20 PROCEDURE — 82784 ASSAY IGA/IGD/IGG/IGM EACH: CPT | Mod: 59 | Performed by: INTERNAL MEDICINE

## 2024-06-25 ENCOUNTER — TELEPHONE (OUTPATIENT)
Dept: TRANSPLANT | Facility: CLINIC | Age: 44
End: 2024-06-25
Payer: COMMERCIAL

## 2024-06-25 ENCOUNTER — INFUSION (OUTPATIENT)
Dept: INFUSION THERAPY | Facility: HOSPITAL | Age: 44
End: 2024-06-25
Attending: INTERNAL MEDICINE
Payer: COMMERCIAL

## 2024-06-25 VITALS
OXYGEN SATURATION: 96 % | SYSTOLIC BLOOD PRESSURE: 137 MMHG | RESPIRATION RATE: 16 BRPM | BODY MASS INDEX: 40.48 KG/M2 | DIASTOLIC BLOOD PRESSURE: 98 MMHG | WEIGHT: 242.94 LBS | TEMPERATURE: 98 F | HEIGHT: 65 IN | HEART RATE: 78 BPM

## 2024-06-25 DIAGNOSIS — M06.9 RHEUMATOID ARTHRITIS FLARE: Primary | ICD-10-CM

## 2024-06-25 PROCEDURE — 96375 TX/PRO/DX INJ NEW DRUG ADDON: CPT

## 2024-06-25 PROCEDURE — 96365 THER/PROPH/DIAG IV INF INIT: CPT

## 2024-06-25 PROCEDURE — 63600175 PHARM REV CODE 636 W HCPCS: Performed by: INTERNAL MEDICINE

## 2024-06-25 PROCEDURE — 25000003 PHARM REV CODE 250: Performed by: INTERNAL MEDICINE

## 2024-06-25 PROCEDURE — 96366 THER/PROPH/DIAG IV INF ADDON: CPT

## 2024-06-25 RX ORDER — SODIUM CHLORIDE 0.9 % (FLUSH) 0.9 %
10 SYRINGE (ML) INJECTION
Status: DISCONTINUED | OUTPATIENT
Start: 2024-06-25 | End: 2024-06-25 | Stop reason: HOSPADM

## 2024-06-25 RX ORDER — SODIUM CHLORIDE 0.9 % (FLUSH) 0.9 %
10 SYRINGE (ML) INJECTION
OUTPATIENT
Start: 2024-07-02

## 2024-06-25 RX ORDER — DIPHENHYDRAMINE HYDROCHLORIDE 50 MG/ML
25 INJECTION INTRAMUSCULAR; INTRAVENOUS
Start: 2024-07-02

## 2024-06-25 RX ORDER — FAMOTIDINE 10 MG/ML
20 INJECTION INTRAVENOUS
Status: COMPLETED | OUTPATIENT
Start: 2024-06-25 | End: 2024-06-25

## 2024-06-25 RX ORDER — SODIUM CHLORIDE 0.9 % (FLUSH) 0.9 %
10 SYRINGE (ML) INJECTION
Status: CANCELLED | OUTPATIENT
Start: 2024-07-02

## 2024-06-25 RX ORDER — HEPARIN 100 UNIT/ML
500 SYRINGE INTRAVENOUS
OUTPATIENT
Start: 2024-07-02

## 2024-06-25 RX ORDER — METHYLPREDNISOLONE SOD SUCC 125 MG
100 VIAL (EA) INJECTION
Start: 2024-07-02

## 2024-06-25 RX ORDER — FAMOTIDINE 10 MG/ML
20 INJECTION INTRAVENOUS
OUTPATIENT
Start: 2024-07-02

## 2024-06-25 RX ORDER — ACETAMINOPHEN 325 MG/1
650 TABLET ORAL
OUTPATIENT
Start: 2024-07-02

## 2024-06-25 RX ORDER — METHYLPREDNISOLONE SOD SUCC 125 MG
100 VIAL (EA) INJECTION
Status: COMPLETED | OUTPATIENT
Start: 2024-06-25 | End: 2024-06-25

## 2024-06-25 RX ORDER — DIPHENHYDRAMINE HYDROCHLORIDE 50 MG/ML
25 INJECTION INTRAMUSCULAR; INTRAVENOUS
Status: COMPLETED | OUTPATIENT
Start: 2024-06-25 | End: 2024-06-25

## 2024-06-25 RX ORDER — ACETAMINOPHEN 325 MG/1
650 TABLET ORAL
Status: COMPLETED | OUTPATIENT
Start: 2024-06-25 | End: 2024-06-25

## 2024-06-25 RX ADMIN — RITUXIMAB 1000 MG: 10 INJECTION, SOLUTION INTRAVENOUS at 09:06

## 2024-06-25 RX ADMIN — ACETAMINOPHEN 650 MG: 325 TABLET ORAL at 09:06

## 2024-06-25 RX ADMIN — DIPHENHYDRAMINE HYDROCHLORIDE 25 MG: 50 INJECTION INTRAMUSCULAR; INTRAVENOUS at 09:06

## 2024-06-25 RX ADMIN — FAMOTIDINE 20 MG: 10 INJECTION INTRAVENOUS at 09:06

## 2024-06-25 RX ADMIN — METHYLPREDNISOLONE SODIUM SUCCINATE 100 MG: 125 INJECTION, POWDER, FOR SOLUTION INTRAMUSCULAR; INTRAVENOUS at 09:06

## 2024-06-25 NOTE — TELEPHONE ENCOUNTER
----- Message from Tanya George sent at 6/25/2024  9:55 AM CDT -----  Regarding: Appt  Contact: Pt  [phone]  Pt is calling to see why appt was cancelled please call

## 2024-06-25 NOTE — NURSING
Infusion # 2/2 - Rituxan 1,000 mg   Last dose-4/24/24     Any:  -recent illness, infection, or antibiotic use in past week- denies  -open wounds or mouth sores- denies  -invasive procedures or surgeries in past 4 weeks or in upcoming 4 weeks- denies  -vaccinations in past week- denies  -chance you may be pregnant- denie     Recent labs- 6/20/24  Last provider visit- Seen by Dr. MANCILLA on 9/27/23     Premeds- 650 mg Tylenol PO, 100 mg Solumedrol IVP over 3 minutes, 20 mg Pepcid IVP over 3 minutes, and 25 mg Benadryl IVP over 3 minutes.       Rituxan 1,000 mg administered IV starting at 50 ml/hr and increasing rate by 50 ml/hr every 30 minutes to a max rate of 400 ml/hr per protocol; see MAR and vitals for more details. Tolerated well without adverse events, discharged and ambulatory out of clinic.

## 2024-06-25 NOTE — PLAN OF CARE
Plan of care reviewed with patient. Discussed if there are any new or ongoing concerns. Denies.   Problem: Adult Inpatient Plan of Care  Goal: Plan of Care Review  Outcome: Progressing  Flowsheets (Taken 6/25/2024 1423)  Plan of Care Reviewed With: patient  Goal: Patient-Specific Goal (Individualized)  Outcome: Progressing  Goal: Absence of Hospital-Acquired Illness or Injury  Outcome: Progressing  Intervention: Identify and Manage Fall Risk  Flowsheets (Taken 6/25/2024 1423)  Safety Promotion/Fall Prevention: in recliner, wheels locked  Goal: Optimal Comfort and Wellbeing  Outcome: Progressing  Intervention: Provide Person-Centered Care  Flowsheets (Taken 6/25/2024 1423)  Trust Relationship/Rapport:   care explained   reassurance provided   choices provided   thoughts/feelings acknowledged   emotional support provided   empathic listening provided   questions answered   questions encouraged

## 2024-06-25 NOTE — TELEPHONE ENCOUNTER
JOCE called and spoke with patient about appointment. Since new referral was placed, Jeannine rescheduled appointments to accommodate labs and walk but Dr. Steiner recommended that patient have RHC, labs, echo, 6MW and PFT's but patient had PFT's in march so JOCE requested results to be faxed to clinic. Message sent to schedulers to have all testing scheduled ad appointment rescheduled so that it can all be done in the same day.

## 2024-07-16 ENCOUNTER — TELEPHONE (OUTPATIENT)
Dept: TRANSPLANT | Facility: CLINIC | Age: 44
End: 2024-07-16
Payer: COMMERCIAL

## 2024-08-08 ENCOUNTER — PATIENT MESSAGE (OUTPATIENT)
Dept: CARDIOLOGY | Facility: CLINIC | Age: 44
End: 2024-08-08
Payer: COMMERCIAL

## 2024-08-08 DIAGNOSIS — J84.9 ILD (INTERSTITIAL LUNG DISEASE): ICD-10-CM

## 2024-08-08 DIAGNOSIS — J84.112 IDIOPATHIC PULMONARY FIBROSIS: ICD-10-CM

## 2024-08-09 RX ORDER — NINTEDANIB 150 MG/1
CAPSULE ORAL
Qty: 60 CAPSULE | Refills: 11 | Status: SHIPPED | OUTPATIENT
Start: 2024-08-09

## 2024-08-10 RX ORDER — BUMETANIDE 1 MG/1
1 TABLET ORAL 2 TIMES DAILY
Qty: 180 TABLET | Refills: 1 | Status: SHIPPED | OUTPATIENT
Start: 2024-08-10

## 2024-08-29 DIAGNOSIS — R06.09 DOE (DYSPNEA ON EXERTION): Primary | ICD-10-CM

## 2024-08-29 DIAGNOSIS — E78.2 MIXED HYPERLIPIDEMIA: ICD-10-CM

## 2024-09-10 ENCOUNTER — TELEPHONE (OUTPATIENT)
Dept: INFUSION THERAPY | Facility: HOSPITAL | Age: 44
End: 2024-09-10
Payer: COMMERCIAL

## 2024-09-10 NOTE — TELEPHONE ENCOUNTER
At 8:20 attempted to call patient re: 8am lab appt and 8:15 infusion appt as pt had checked in for neither. Call went to generic voicemail without stating pt's name. No message left.

## 2024-09-16 ENCOUNTER — TELEPHONE (OUTPATIENT)
Dept: RHEUMATOLOGY | Facility: CLINIC | Age: 44
End: 2024-09-16
Payer: COMMERCIAL

## 2024-09-16 NOTE — TELEPHONE ENCOUNTER
Pt no showed her lab, and Rituxan 1 of 2 infusion appts. Rescheduled pt verbalized understanding. She will see Dr CHARO zamora on 9/24.

## 2024-09-16 NOTE — TELEPHONE ENCOUNTER
----- Message from Ludmila Sow MA sent at 9/16/2024 11:35 AM CDT -----  Lois this patient would like to speak with you.  She said the time on her rituxan  should be 420min  because she missed last week.  And the lab working need to be rescheduled.   thanks

## 2024-09-18 ENCOUNTER — LAB VISIT (OUTPATIENT)
Dept: LAB | Facility: HOSPITAL | Age: 44
End: 2024-09-18
Attending: INTERNAL MEDICINE
Payer: COMMERCIAL

## 2024-09-18 DIAGNOSIS — M32.13 OTHER SYSTEMIC LUPUS ERYTHEMATOSUS WITH LUNG INVOLVEMENT: ICD-10-CM

## 2024-09-18 DIAGNOSIS — M06.9 RHEUMATOID ARTHRITIS FLARE: ICD-10-CM

## 2024-09-18 DIAGNOSIS — J84.9 ILD (INTERSTITIAL LUNG DISEASE): ICD-10-CM

## 2024-09-18 DIAGNOSIS — D84.9 IMMUNOSUPPRESSED STATUS: ICD-10-CM

## 2024-09-18 DIAGNOSIS — M06.00 SERONEGATIVE RHEUMATOID ARTHRITIS: ICD-10-CM

## 2024-09-18 DIAGNOSIS — Z79.899 HIGH RISK MEDICATION USE: ICD-10-CM

## 2024-09-18 DIAGNOSIS — M35.1 MCTD (MIXED CONNECTIVE TISSUE DISEASE): ICD-10-CM

## 2024-09-18 LAB
ALBUMIN SERPL BCP-MCNC: 3.4 G/DL (ref 3.5–5.2)
ALP SERPL-CCNC: 87 U/L (ref 55–135)
ALT SERPL W/O P-5'-P-CCNC: 25 U/L (ref 10–44)
ANION GAP SERPL CALC-SCNC: 11 MMOL/L (ref 8–16)
ANISOCYTOSIS BLD QL SMEAR: ABNORMAL
AST SERPL-CCNC: 30 U/L (ref 10–40)
BASOPHILS # BLD AUTO: 0.07 K/UL (ref 0–0.2)
BASOPHILS NFR BLD: 0.5 % (ref 0–1.9)
BILIRUB SERPL-MCNC: 0.2 MG/DL (ref 0.1–1)
BUN SERPL-MCNC: 11 MG/DL (ref 6–20)
CALCIUM SERPL-MCNC: 9.2 MG/DL (ref 8.7–10.5)
CHLORIDE SERPL-SCNC: 100 MMOL/L (ref 95–110)
CO2 SERPL-SCNC: 28 MMOL/L (ref 23–29)
CREAT SERPL-MCNC: 0.9 MG/DL (ref 0.5–1.4)
CRP SERPL-MCNC: 2.4 MG/L (ref 0–8.2)
DACRYOCYTES BLD QL SMEAR: ABNORMAL
DIFFERENTIAL METHOD BLD: ABNORMAL
EOSINOPHIL # BLD AUTO: 0.3 K/UL (ref 0–0.5)
EOSINOPHIL NFR BLD: 2.3 % (ref 0–8)
ERYTHROCYTE [DISTWIDTH] IN BLOOD BY AUTOMATED COUNT: 24.7 % (ref 11.5–14.5)
ERYTHROCYTE [SEDIMENTATION RATE] IN BLOOD BY WESTERGREN METHOD: 32 MM/HR (ref 0–20)
EST. GFR  (NO RACE VARIABLE): >60 ML/MIN/1.73 M^2
GLUCOSE SERPL-MCNC: 82 MG/DL (ref 70–110)
HBV CORE AB SERPL QL IA: NORMAL
HBV CORE AB SERPL QL IA: NORMAL
HBV SURFACE AG SERPL QL IA: NORMAL
HCT VFR BLD AUTO: 35.1 % (ref 37–48.5)
HGB BLD-MCNC: 10.3 G/DL (ref 12–16)
IGA SERPL-MCNC: 393 MG/DL (ref 40–350)
IGG SERPL-MCNC: 903 MG/DL (ref 650–1600)
IGM SERPL-MCNC: 28 MG/DL (ref 50–300)
IMM GRANULOCYTES # BLD AUTO: 0.38 K/UL (ref 0–0.04)
IMM GRANULOCYTES NFR BLD AUTO: 2.8 % (ref 0–0.5)
LYMPHOCYTES # BLD AUTO: 3.3 K/UL (ref 1–4.8)
LYMPHOCYTES NFR BLD: 23.7 % (ref 18–48)
MCH RBC QN AUTO: 24.1 PG (ref 27–31)
MCHC RBC AUTO-ENTMCNC: 29.3 G/DL (ref 32–36)
MCV RBC AUTO: 82 FL (ref 82–98)
MONOCYTES # BLD AUTO: 1.2 K/UL (ref 0.3–1)
MONOCYTES NFR BLD: 8.4 % (ref 4–15)
NEUTROPHILS # BLD AUTO: 8.5 K/UL (ref 1.8–7.7)
NEUTROPHILS NFR BLD: 62.3 % (ref 38–73)
NRBC BLD-RTO: 0 /100 WBC
PLATELET # BLD AUTO: 274 K/UL (ref 150–450)
PMV BLD AUTO: 11.3 FL (ref 9.2–12.9)
POTASSIUM SERPL-SCNC: 3.4 MMOL/L (ref 3.5–5.1)
PROT SERPL-MCNC: 6.9 G/DL (ref 6–8.4)
RBC # BLD AUTO: 4.28 M/UL (ref 4–5.4)
SODIUM SERPL-SCNC: 139 MMOL/L (ref 136–145)
WBC # BLD AUTO: 13.7 K/UL (ref 3.9–12.7)

## 2024-09-18 PROCEDURE — 85651 RBC SED RATE NONAUTOMATED: CPT | Performed by: INTERNAL MEDICINE

## 2024-09-18 PROCEDURE — 86480 TB TEST CELL IMMUN MEASURE: CPT | Performed by: INTERNAL MEDICINE

## 2024-09-18 PROCEDURE — 87340 HEPATITIS B SURFACE AG IA: CPT | Performed by: INTERNAL MEDICINE

## 2024-09-18 PROCEDURE — 86704 HEP B CORE ANTIBODY TOTAL: CPT | Performed by: INTERNAL MEDICINE

## 2024-09-18 PROCEDURE — 36415 COLL VENOUS BLD VENIPUNCTURE: CPT | Mod: PN | Performed by: INTERNAL MEDICINE

## 2024-09-18 PROCEDURE — 82784 ASSAY IGA/IGD/IGG/IGM EACH: CPT | Mod: 59 | Performed by: INTERNAL MEDICINE

## 2024-09-18 PROCEDURE — 86140 C-REACTIVE PROTEIN: CPT | Performed by: INTERNAL MEDICINE

## 2024-09-18 PROCEDURE — 85025 COMPLETE CBC W/AUTO DIFF WBC: CPT | Performed by: INTERNAL MEDICINE

## 2024-09-18 PROCEDURE — 80053 COMPREHEN METABOLIC PANEL: CPT | Performed by: INTERNAL MEDICINE

## 2024-09-18 RX ORDER — HYDROXYCHLOROQUINE SULFATE 200 MG/1
200 TABLET, FILM COATED ORAL 2 TIMES DAILY
Qty: 60 TABLET | Refills: 6 | Status: SHIPPED | OUTPATIENT
Start: 2024-09-18

## 2024-09-20 LAB
GAMMA INTERFERON BACKGROUND BLD IA-ACNC: 0.03 IU/ML
M TB IFN-G CD4+ BCKGRND COR BLD-ACNC: 0.01 IU/ML
M TB IFN-G CD4+ BCKGRND COR BLD-ACNC: 0.02 IU/ML
MITOGEN IGNF BCKGRD COR BLD-ACNC: 9.97 IU/ML
TB GOLD PLUS: NEGATIVE

## 2024-09-24 ENCOUNTER — OFFICE VISIT (OUTPATIENT)
Dept: RHEUMATOLOGY | Facility: CLINIC | Age: 44
End: 2024-09-24
Payer: COMMERCIAL

## 2024-09-24 ENCOUNTER — LAB VISIT (OUTPATIENT)
Dept: LAB | Facility: HOSPITAL | Age: 44
End: 2024-09-24
Payer: COMMERCIAL

## 2024-09-24 ENCOUNTER — INFUSION (OUTPATIENT)
Dept: INFUSION THERAPY | Facility: HOSPITAL | Age: 44
End: 2024-09-24
Attending: INTERNAL MEDICINE
Payer: COMMERCIAL

## 2024-09-24 VITALS
WEIGHT: 241.19 LBS | SYSTOLIC BLOOD PRESSURE: 133 MMHG | OXYGEN SATURATION: 98 % | RESPIRATION RATE: 18 BRPM | DIASTOLIC BLOOD PRESSURE: 71 MMHG | TEMPERATURE: 98 F | BODY MASS INDEX: 40.18 KG/M2 | HEIGHT: 65 IN | HEART RATE: 68 BPM

## 2024-09-24 DIAGNOSIS — Z79.899 DRUG-INDUCED IMMUNODEFICIENCY: ICD-10-CM

## 2024-09-24 DIAGNOSIS — D84.821 DRUG-INDUCED IMMUNODEFICIENCY: ICD-10-CM

## 2024-09-24 DIAGNOSIS — M32.13 OTHER SYSTEMIC LUPUS ERYTHEMATOSUS WITH LUNG INVOLVEMENT: ICD-10-CM

## 2024-09-24 DIAGNOSIS — Z79.899 HIGH RISK MEDICATION USE: ICD-10-CM

## 2024-09-24 DIAGNOSIS — M06.9 RHEUMATOID ARTHRITIS FLARE: Primary | ICD-10-CM

## 2024-09-24 DIAGNOSIS — D84.9 IMMUNOSUPPRESSED STATUS: ICD-10-CM

## 2024-09-24 DIAGNOSIS — J84.9 ILD (INTERSTITIAL LUNG DISEASE): ICD-10-CM

## 2024-09-24 DIAGNOSIS — M35.1 MCTD (MIXED CONNECTIVE TISSUE DISEASE): Primary | ICD-10-CM

## 2024-09-24 DIAGNOSIS — I27.20 PULMONARY HYPERTENSION: ICD-10-CM

## 2024-09-24 LAB — B-HCG UR QL: NEGATIVE

## 2024-09-24 PROCEDURE — 1159F MED LIST DOCD IN RCRD: CPT | Mod: CPTII,S$GLB,, | Performed by: INTERNAL MEDICINE

## 2024-09-24 PROCEDURE — 25000003 PHARM REV CODE 250: Performed by: INTERNAL MEDICINE

## 2024-09-24 PROCEDURE — 96365 THER/PROPH/DIAG IV INF INIT: CPT

## 2024-09-24 PROCEDURE — 63600175 PHARM REV CODE 636 W HCPCS: Performed by: INTERNAL MEDICINE

## 2024-09-24 PROCEDURE — 96366 THER/PROPH/DIAG IV INF ADDON: CPT

## 2024-09-24 PROCEDURE — 1160F RVW MEDS BY RX/DR IN RCRD: CPT | Mod: CPTII,S$GLB,, | Performed by: INTERNAL MEDICINE

## 2024-09-24 PROCEDURE — 96375 TX/PRO/DX INJ NEW DRUG ADDON: CPT

## 2024-09-24 PROCEDURE — 81025 URINE PREGNANCY TEST: CPT | Performed by: INTERNAL MEDICINE

## 2024-09-24 PROCEDURE — G2211 COMPLEX E/M VISIT ADD ON: HCPCS | Mod: S$GLB,,, | Performed by: INTERNAL MEDICINE

## 2024-09-24 PROCEDURE — 99999 PR PBB SHADOW E&M-EST. PATIENT-LVL II: CPT | Mod: PBBFAC,,, | Performed by: INTERNAL MEDICINE

## 2024-09-24 PROCEDURE — 99215 OFFICE O/P EST HI 40 MIN: CPT | Mod: S$GLB,,, | Performed by: INTERNAL MEDICINE

## 2024-09-24 RX ORDER — ACETAMINOPHEN 325 MG/1
650 TABLET ORAL
OUTPATIENT
Start: 2024-10-07

## 2024-09-24 RX ORDER — FAMOTIDINE 10 MG/ML
20 INJECTION INTRAVENOUS
Status: COMPLETED | OUTPATIENT
Start: 2024-09-24 | End: 2024-09-24

## 2024-09-24 RX ORDER — HEPARIN 100 UNIT/ML
500 SYRINGE INTRAVENOUS
OUTPATIENT
Start: 2024-10-07

## 2024-09-24 RX ORDER — METHYLPREDNISOLONE SOD SUCC 125 MG
100 VIAL (EA) INJECTION
Start: 2024-10-07

## 2024-09-24 RX ORDER — FAMOTIDINE 10 MG/ML
20 INJECTION INTRAVENOUS
OUTPATIENT
Start: 2024-10-07

## 2024-09-24 RX ORDER — METHYLPREDNISOLONE SOD SUCC 125 MG
100 VIAL (EA) INJECTION
Status: COMPLETED | OUTPATIENT
Start: 2024-09-24 | End: 2024-09-24

## 2024-09-24 RX ORDER — SODIUM CHLORIDE 0.9 % (FLUSH) 0.9 %
10 SYRINGE (ML) INJECTION
OUTPATIENT
Start: 2024-10-07

## 2024-09-24 RX ORDER — PREDNISONE 5 MG/1
TABLET ORAL
Qty: 225 TABLET | Refills: 0 | Status: SHIPPED | OUTPATIENT
Start: 2024-09-24 | End: 2025-01-22

## 2024-09-24 RX ORDER — DIPHENHYDRAMINE HYDROCHLORIDE 50 MG/ML
25 INJECTION INTRAMUSCULAR; INTRAVENOUS
Start: 2024-10-07

## 2024-09-24 RX ORDER — DIPHENHYDRAMINE HYDROCHLORIDE 50 MG/ML
25 INJECTION INTRAMUSCULAR; INTRAVENOUS
Status: COMPLETED | OUTPATIENT
Start: 2024-09-24 | End: 2024-09-24

## 2024-09-24 RX ORDER — ACETAMINOPHEN 325 MG/1
650 TABLET ORAL
Status: COMPLETED | OUTPATIENT
Start: 2024-09-24 | End: 2024-09-24

## 2024-09-24 RX ORDER — SODIUM CHLORIDE 0.9 % (FLUSH) 0.9 %
10 SYRINGE (ML) INJECTION
Status: DISCONTINUED | OUTPATIENT
Start: 2024-09-24 | End: 2024-09-24 | Stop reason: HOSPADM

## 2024-09-24 RX ADMIN — RITUXIMAB 1000 MG: 10 INJECTION, SOLUTION INTRAVENOUS at 09:09

## 2024-09-24 RX ADMIN — FAMOTIDINE 20 MG: 10 INJECTION, SOLUTION INTRAVENOUS at 09:09

## 2024-09-24 RX ADMIN — METHYLPREDNISOLONE SODIUM SUCCINATE 100 MG: 125 INJECTION, POWDER, FOR SOLUTION INTRAMUSCULAR; INTRAVENOUS at 09:09

## 2024-09-24 RX ADMIN — ACETAMINOPHEN 650 MG: 325 TABLET ORAL at 09:09

## 2024-09-24 RX ADMIN — DIPHENHYDRAMINE HYDROCHLORIDE 25 MG: 50 INJECTION INTRAMUSCULAR; INTRAVENOUS at 09:09

## 2024-09-24 NOTE — NURSING
Infusion# 1 of 2 cycle 5  S/S infection noted or voiced? None/Denies  Recent labs? Reviewed    Premeds? Tylenol 650 mg po, Benadryl 25 mg IVP over 2 min, Solumedrol 100 mg IVP over 2 min, Pepcid 20 mg IVP over 2 min.    Rituxan 1000 mg administered IV over 4 hrs 15 minutes and titrated per protocol/ per orders; see MAR and vitals for more  details.   Tolerated well without adverse events.

## 2024-09-24 NOTE — PROGRESS NOTES
RHEUMATOLOGY FOLLOW UP VISIT     Chief complaints, HPI, ROS, EXAM, Assessment & Plans:-  Daija Garcia a 44 y.o. pleasant female seen today for follow-up visit.She has a complex history of mixed connective tissue disease with interstitial lung disease and lupus arthritis.  She is on Plaquenil for arthritis.   Failed CellCept.  Intolerance to Prograf, imuran and Cytoxan oral therapy.  Recently reestablish care and was restarted on rituximab.  On prednisone 20 mg from her pulmonologist.  Reports significant improvement of joint pain and shortness of breath.  No active symptoms today. Rheumatological review of system negative otherwise.  100% fist formation bilateral hands.  No evidence of synovitis.    1. MCTD (mixed connective tissue disease)    2. ILD (interstitial lung disease)    3. Other systemic lupus erythematosus with lung involvement    4. Drug-induced immunodeficiency    5. Immunosuppressed status    6. Pulmonary hypertension      Problem List Items Addressed This Visit       Drug-induced immunodeficiency    ILD (interstitial lung disease)    Immunosuppressed status    MCTD (mixed connective tissue disease) - Primary    Pulmonary hypertension    Systemic lupus erythematosus with lung involvement      Latest Reference Range & Units Most Recent   ANDERSON Negative  Positive !  7/25/19 09:17   ANDERSON Screen Negative <1:80  Positive !  5/22/23 14:35   ANDERSON HEP-2 Titer  Positive 1:1280 Speckled  8/8/19 13:26   ANDERSON Titer 1  1:320  5/22/23 14:35   ANDERSON PATTERN 1  Speckled  5/22/23 14:35   ds DNA Ab Negative 1:10  Negative 1:10  5/22/23 14:35   Anti-SSA Antibody 0.00 - 0.99 Ratio 0.29  5/22/23 14:35   Anti-SSA Interpretation Negative  Negative  5/22/23 14:35   Anti-SSB Antibody 0.00 - 0.99 Ratio 0.06  5/22/23 14:35   Anti-SSB Interpretation Negative  Negative  5/22/23 14:35   Anti Sm Antibody 0.00 - 0.99 Ratio 0.21  5/22/23 14:35   Anti-Sm Interpretation Negative  Negative  5/22/23 14:35   Anti Sm/RNP Antibody 0.00  - 0.99 Ratio 0.40  5/22/23 14:35   Anti-Sm/RNP Interpretation Negative  Negative  5/22/23 14:35   ANCA Proteinase 3 <0.4 (Negative) U <0.2  8/8/19 13:26   Cytoplasmic Neutrophilic Ab <1:20 Titer <1:20  8/8/19 13:26   MPO <=20 UNITS 4  8/8/19 13:26   Perinuclear (P-ANCA) <1:20 Titer <1:20  8/8/19 13:26   Scleroderma SCL- <20 UNITS 5  8/8/19 13:26   SSA Antibody 0.0 - 0.9 AI 0.5  7/25/19 09:17   SSB Antibody 0.0 - 0.9 AI 2.4 (H)  7/25/19 09:17   CCP Antibodies <5.0 U/mL 0.5  8/8/19 13:26   Complement (C-3) 50 - 180 mg/dL 138  5/6/21 10:20   Complement (C-4) 11 - 44 mg/dL 33  5/6/21 10:20   IgE 0 - 100 IU/mL <35  5/6/21 10:20   Protein, Serum 6.0 - 8.4 g/dL 7.6  8/8/19 13:26   Albumin grams/dl 3.35 - 5.55 g/dL 3.63  8/8/19 13:26   Alpha-1 grams/dl 0.17 - 0.41 g/dL 0.39  8/8/19 13:26   Alpha-2 0.43 - 0.99 g/dL 0.93  8/8/19 13:26   Beta 0.50 - 1.10 g/dL 1.11 (H)  8/8/19 13:26   Gamma 0.67 - 1.58 g/dL 1.54  8/8/19 13:26   Pathologist Interpretation SPE  REVIEWED  8/8/19 13:26   Pathologist Interpretation ELIZABETH  REVIEWED  8/8/19 13:26   [employer] Interp.  SEE COMMENT  8/8/19 13:26   Anti-Aleena-1 Antibody <20 Units <20  8/8/19 13:26   Anti-PM/Scl Ab <20 Units <20  8/8/19 13:26   Anti-SS-A 52 kD Ab, IgG <20 Units <20  8/8/19 13:26   Anti-U1-RNP  Ab <20 Units 68 (H)  8/8/19 13:26   EJ Negative  Negative  8/8/19 13:26   Fibrillarin (U3 RNP) Negative  Negative  8/8/19 13:26   Ku Negative  Negative  8/8/19 13:26   MDA-5 (P140) (CADM-140) <20 Units <20  8/8/19 13:26   MI-2 Negative  Negative  8/8/19 13:26   NXP-2 (P140) <20 Units <20  8/8/19 13:26   OJ Negative  Negative  8/8/19 13:26   PL-12 Negative  Negative  8/8/19 13:26   PL-7 Negative  Negative  8/8/19 13:26   Rheumatoid Factor 0.0 - 15.0 IU/mL <10.0  8/8/19 13:26   RNP Antibody 0.0 - 0.9 AI 1.7 (H)  7/25/19 09:17   SRP Negative  Negative  8/8/19 13:26   TIF1 GAMMA (P155/140) <20 Units <20  8/8/19 13:26   U2 snRNP Negative  Negative  8/8/19 13:26   !: Data is abnormal  (H): Data  is abnormally high  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  Still taking prednisone 20 mg daily.    Start tapering prednisone as advised.   Continue rituximab every 4 months with all 4 labs and quantitative immunoglobulins.  Continue follow up with Transplant team for right heart cath,.   Continue Ofev.Repeat CT chest.   Drug induced immunodeficiency due to use of immunosuppressive drugs. Monitor carefully for infections. Advised to get immediate medical care if any infection. Also advised strict adherence to age appropriate vaccinations and cancer screenings with PCP.  I have explained all of the above in detail and the patient understands all of the current recommendation(s). I have answered all questions to the best of my ability and to their complete satisfaction.         Past Medical History:   Diagnosis Date    ADHD (attention deficit hyperactivity disorder)     Asthma     Hypertension     Hypothyroidism     Mixed restrictive and obstructive lung disease     Pulmonary hypertension     Rheumatoid arthritis flare 06/22/2021    TIA (transient ischemic attack)        Past Surgical History:   Procedure Laterality Date    BRONCHOSCOPY Bilateral 8/16/2019    Procedure: Bronchoscopy;  Surgeon: Virgilio Weiss MD;  Location: [facility] ENDO;  Service: Endoscopy;  Laterality: Bilateral;    RIGHT HEART CATHETERIZATION Right 7/5/2023    Procedure: INSERTION, CATHETER, RIGHT HEART;  Surgeon: Kavon Kunz MD;  Location: [facility] CATH LAB;  Service: Cardiology;  Laterality: Right;        Social History     Tobacco Use    Smoking status: Never    Smokeless tobacco: Never   Substance Use Topics    Alcohol use: Not Currently     Comment: social    Drug use: No       Family History   Problem Relation Name Age of Onset    Cancer Maternal Aunt          Breast, Back, Lung Cancer     Cancer Father         Review of patient's allergies indicates:   Allergen Reactions    Ceftriaxone Swelling     Patient states that BP spike when taken rocephin.     Citrus and derivatives     Codeine Swelling       Medication List with Changes/Refills   Current Medications    ALBUTEROL 90 MCG/ACTUATION INHALER    Inhale 2 puffs into the lungs every 6 (six) hours as needed for Wheezing.    ALBUTEROL-IPRATROPIUM (DUO-NEB) 2.5 MG-0.5 MG/3 ML NEBULIZER SOLUTION    INHALE 1 VIAL PER NEUBULIZER NEBULIZER EVERY 4 HOURS    ATORVASTATIN (LIPITOR) 20 MG TABLET    Take 1 tablet (20 mg total) by mouth every evening.    BENZONATATE (TESSALON) 200 MG CAPSULE    Take 200 mg by mouth 3 (three) times daily as needed.    BUMETANIDE (BUMEX) 1 MG TABLET    Take 1 tablet (1 mg total) by mouth 2 (two) times daily.    DEXTROAMPHETAMINE-AMPHETAMINE (ADDERALL) 15 MG TABLET        FLUTICASONE (FLONASE) 50 MCG/ACTUATION NASAL SPRAY    2 sprays (100 mcg total) by Each Nare route once daily.    FLUTICASONE-UMECLIDIN-VILANTER (TRELEGY ELLIPTA) 200-62.5-25 MCG INHALER    Inhale 1 puff into the lungs.    FOLIC ACID (FOLVITE) 1 MG TABLET    Take 1,000 mcg by mouth once daily.    HYDROXYCHLOROQUINE (PLAQUENIL) 200 MG TABLET    Take 2 tablets by mouth once daily.    HYDROXYCHLOROQUINE (PLAQUENIL) 200 MG TABLET    TAKE 1 TABLET BY MOUTH TWICE A DAY    MAGNESIUM OXIDE (MAG-OX) 400 MG (241.3 MG MAGNESIUM) TABLET    Take 1 tablet (400 mg total) by mouth once daily.    MONTELUKAST (SINGULAIR) 10 MG TABLET    SMARTSI Tablet(s) By Mouth Every Evening    NYSTATIN (MYCOSTATIN) 100,000 UNIT/ML SUSPENSION    Take 5 mLs by mouth 4 (four) times daily.    OFEV 150 MG CAP    TAKE 1 CAPSULE BY MOUTH TWICE A  DAY 12 HOURS APART WITH FOOD    POTASSIUM CHLORIDE SA (K-DUR,KLOR-CON) 20 MEQ TABLET    Take 1 tablet (20 mEq total) by mouth once daily.    PROMETHAZINE (PHENERGAN) 6.25 MG/5 ML SYRUP    SMARTSI.5-10 Milliliter(s) By Mouth 3 Times Daily    QUETIAPINE  (SEROQUEL) 50 MG TABLET    Take 50 mg by mouth every evening.    SEMAGLUTIDE, WEIGHT LOSS, (WEGOVY) 0.25 MG/0.5 ML PNIJ    Inject 0.25 mg into the skin every 7 days. Call office in 6 weeks to increase dose    SULFAMETHOXAZOLE-TRIMETHOPRIM 800-160MG (BACTRIM DS) 800-160 MG TAB    TAKE 1 TABLET BY MOUTH THREE TIMES WEEKLY    VILAZODONE (VIIBRYD) 10 MG (7)- 20 MG (23) DSPK           Disclaimer: This note was prepared using voice recognition system and is likely to have sound alike errors and is not proof read.  Please call me with any questions.      Total time spent 40 minutes. This includes face to face time and non-face to face time preparing to see the patient (eg, review of tests), obtaining and/or reviewing separately obtained history, documenting clinical information in the electronic or other health record, independently interpreting results and communicating results to the patient/family/caregiver, or care coordinator.

## 2024-09-26 ENCOUNTER — PATIENT MESSAGE (OUTPATIENT)
Dept: TRANSPLANT | Facility: CLINIC | Age: 44
End: 2024-09-26

## 2024-09-26 ENCOUNTER — PATIENT MESSAGE (OUTPATIENT)
Dept: CARDIOLOGY | Facility: CLINIC | Age: 44
End: 2024-09-26
Payer: COMMERCIAL

## 2024-09-26 ENCOUNTER — HOSPITAL ENCOUNTER (OUTPATIENT)
Dept: CARDIOLOGY | Facility: HOSPITAL | Age: 44
Discharge: HOME | End: 2024-09-26
Attending: INTERNAL MEDICINE
Payer: COMMERCIAL

## 2024-09-26 ENCOUNTER — HOSPITAL ENCOUNTER (OUTPATIENT)
Facility: HOSPITAL | Age: 44
Discharge: HOME | End: 2024-09-26
Attending: INTERNAL MEDICINE | Admitting: INTERNAL MEDICINE
Payer: COMMERCIAL

## 2024-09-26 ENCOUNTER — HOSPITAL ENCOUNTER (OUTPATIENT)
Dept: PULMONOLOGY | Facility: CLINIC | Age: 44
Discharge: HOME | End: 2024-09-26
Payer: COMMERCIAL

## 2024-09-26 VITALS
HEIGHT: 65 IN | HEART RATE: 70 BPM | SYSTOLIC BLOOD PRESSURE: 130 MMHG | DIASTOLIC BLOOD PRESSURE: 70 MMHG | BODY MASS INDEX: 40.15 KG/M2 | WEIGHT: 241 LBS

## 2024-09-26 VITALS — BODY MASS INDEX: 39.96 KG/M2 | HEIGHT: 65 IN | WEIGHT: 239.81 LBS

## 2024-09-26 DIAGNOSIS — I27.9 CHRONIC PULMONARY HEART DISEASE: ICD-10-CM

## 2024-09-26 DIAGNOSIS — I27.20 PULMONARY HYPERTENSION: ICD-10-CM

## 2024-09-26 DIAGNOSIS — R06.02 SHORTNESS OF BREATH: ICD-10-CM

## 2024-09-26 LAB
ASCENDING AORTA: 3.28 CM
AV AREA BY CONTINUOUS VTI: 2.9 CM2
AV INDEX (PROSTH): 0.73
AV LVOT MEAN GRADIENT: 2 MMHG
AV LVOT PEAK GRADIENT: 3 MMHG
AV MEAN GRADIENT: 4 MMHG
AV PEAK GRADIENT: 6 MMHG
AV VALVE AREA BY VELOCITY RATIO: 2.94 CM²
AV VALVE AREA: 2.93 CM2
AV VELOCITY RATIO: 0.73
BSA FOR ECHO PROCEDURE: 2.24 M2
CV ECHO LV RWT: 0.43 CM
DOP CALC AO PEAK VEL: 1.24 M/S
DOP CALC AO VTI: 23.73 CM
DOP CALC LVOT AREA: 4 CM2
DOP CALC LVOT DIAMETER: 2.26 CM
DOP CALC LVOT PEAK VEL: 0.91 M/S
DOP CALC LVOT STROKE VOLUME: 69.6 CM3
DOP CALCLVOT PEAK VEL VTI: 17.36 CM
E WAVE DECELERATION TIME: 122.48 MS
E/A RATIO: 0.7
E/E' RATIO: 5 M/S
ECHO EF ESTIMATED: 67 %
ECHO LV POSTERIOR WALL: 0.98 CM (ref 0.6–1.1)
FRACTIONAL SHORTENING: 37 % (ref 28–44)
INTERVENTRICULAR SEPTUM: 1 CM (ref 0.6–1.1)
IVC DIAMETER: 1.65 CM
IVRT: 128.45 MS
LA MAJOR: 4.67 CM
LA MINOR: 4.39 CM
LA WIDTH: 3.57 CM
LEFT ATRIUM SIZE: 3.84 CM
LEFT ATRIUM VOLUME INDEX MOD: 28.9 ML/M2
LEFT ATRIUM VOLUME INDEX: 24.6 ML/M2
LEFT ATRIUM VOLUME MOD: 61.74 ML
LEFT ATRIUM VOLUME: 52.74 CM3
LEFT INTERNAL DIMENSION IN SYSTOLE: 2.86 CM (ref 2.1–4)
LEFT VENTRICLE DIASTOLIC VOLUME INDEX: 43.88 ML/M2
LEFT VENTRICLE DIASTOLIC VOLUME: 93.91 ML
LEFT VENTRICLE MASS INDEX: 71 G/M2
LEFT VENTRICLE SYSTOLIC VOLUME INDEX: 14.6 ML/M2
LEFT VENTRICLE SYSTOLIC VOLUME: 31.14 ML
LEFT VENTRICULAR INTERNAL DIMENSION IN DIASTOLE: 4.53 CM (ref 3.5–6)
LEFT VENTRICULAR MASS: 152.8 G
LV LATERAL E/E' RATIO: 4.38
LV SEPTAL E/E' RATIO: 5.83
MV A" WAVE DURATION": 171.27 MS
MV PEAK A VEL: 0.5 M/S
MV PEAK E VEL: 0.35 M/S
PISA TR MAX VEL: 2.88 M/S
PULM VEIN A" WAVE DURATION": 171.27 MS
PULM VEIN S/D RATIO: 1.1
PULMONIC VEIN PEAK A VELOCITY: 0.2 M/S
PV PEAK D VEL: 0.4 M/S
PV PEAK S VEL: 0.44 M/S
RA MAJOR: 4.7 CM
RA PRESSURE ESTIMATED: 3 MMHG
RA WIDTH: 3.18 CM
RIGHT ATRIAL AREA: 14.3 CM2
RIGHT VENTRICLE DIASTOLIC BASEL DIMENSION: 3.8 CM
RV TB RVSP: 6 MMHG
RV TISSUE DOPPLER FREE WALL SYSTOLIC VELOCITY 1 (APICAL 4 CHAMBER VIEW): 9.8 CM/S
SINUS: 3.22 CM
STJ: 3.04 CM
TASV: 9 CM/S
TDI LATERAL: 0.08 M/S
TDI SEPTAL: 0.06 M/S
TDI: 0.07 M/S
TR MAX PG: 33 MMHG
TRICUSPID ANNULAR PLANE SYSTOLIC EXCURSION: 1.52 CM
TV PEAK GRADIENT: 33 MMHG
TV REST PULMONARY ARTERY PRESSURE: 36 MMHG
Z-SCORE OF LEFT VENTRICULAR DIMENSION IN END DIASTOLE: -4.18
Z-SCORE OF LEFT VENTRICULAR DIMENSION IN END SYSTOLE: -3.03

## 2024-09-26 PROCEDURE — 93306 TTE W/DOPPLER COMPLETE: CPT | Mod: 26,,, | Performed by: INTERNAL MEDICINE

## 2024-09-26 PROCEDURE — 93306 TTE W/DOPPLER COMPLETE: CPT

## 2024-09-26 PROCEDURE — 99499 UNLISTED E&M SERVICE: CPT | Mod: ,,, | Performed by: INTERNAL MEDICINE

## 2024-09-26 NOTE — H&P
[facility] - Short Stay Cardiac Unit  Interventional Cardiology  H&P    Patient Name: Daija Luna  MRN: [id number]  Admission Date: 9/26/2024  Code Status: No Order   Attending Provider: Swapnil Steiner MD  Primary Care Physician: No, Primary Doctor  Principal Problem:<principal problem not specified>    Patient information was obtained from patient and past medical records.     Subjective:     Chief Complaint:  PH     HPI: 44 YO F w/ PMH of MCTD, SLE, ILD, HTN, pre-DM, obesity, hypothyroidism, TIA in 2018, ADHD on Adderal who presents for FU RHC    She sees Dr. Kain Mcclain MD as her primary cardiologist. In addition she sees pulmonology and rheumatology for her ILD and connective tissue disorders which include MCTD and SLE.     Her diagnosis of lupus, mixed connective tissue disease, and the associated interstitial lung disease all day back approximately 4 years.    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 Started on Tyvaso which she didn't tolerate well. Comes back today for repeat RHC and referral for PH re-evaluation.    PH Workup     RHC 7/5/23  RHC performed via the right IJ. Patient tolerated the procedure well. At rest, upper normal left and normal right side filling pressures (RA= 9 mm of Hg, PCWP= 16 mm of Hg). Mild pulmonary HTN  (PA=45/18 mm of Hg, PA mean=27 mm of Hg, TPG=11, PVR=1.6). High cardiac index and output  (CI=3.1 L/min/m2, CO=6.8 L/min). After bike exercise, her PA pressures kali significantly to (74/25 mm of Hg, PA mean=41 mm of hg) and her PCWP to 25 mm of Hg.     TTE 5/3/23  The left ventricle is normal in size with concentric hypertrophy and normal systolic function.  Normal left ventricular diastolic function.  The estimated PA systolic pressure is 49 mmHg.  Normal right ventricular size with normal right ventricular systolic function.  There is pulmonary hypertension.  Normal central venous pressure (3 mmHg).  Moderate mitral regurgitation.  Moderate tricuspid regurgitation.  The  estimated ejection fraction is 65%.  No pericardial effusion     V/Q scan Not done (WHO group III)     PFTs 7/11/23 (Outside)  FEV1 55%  FVC 51%  DLCO 30%     ANDERSON Positive (MCTD, SLE)     HIV Negative     BNP < 10, 5/22/23     Lung imaging 7/14/23   Findings compatible with chronic interstitial lung disease which appears unchanged.    Past Medical History:   Diagnosis Date    ADHD (attention deficit hyperactivity disorder)     Asthma     Hypertension     Hypothyroidism     Mixed restrictive and obstructive lung disease     Pulmonary hypertension     Rheumatoid arthritis flare 06/22/2021    TIA (transient ischemic attack)        Past Surgical History:   Procedure Laterality Date    BRONCHOSCOPY Bilateral 8/16/2019    Procedure: Bronchoscopy;  Surgeon: Virgilio Weiss MD;  Location: [facility] ENDO;  Service: Endoscopy;  Laterality: Bilateral;    RIGHT HEART CATHETERIZATION Right 7/5/2023    Procedure: INSERTION, CATHETER, RIGHT HEART;  Surgeon: Kavon Kunz MD;  Location[facility] CATH LAB;  Service: Cardiology;  Laterality: Right;       Review of patient's allergies indicates:   Allergen Reactions    Ceftriaxone Swelling     Patient states that BP spike when taken rocephin.     Citrus and derivatives     Codeine Swelling       PTA Medications   Medication Sig    albuterol 90 mcg/actuation inhaler Inhale 2 puffs into the lungs every 6 (six) hours as needed for Wheezing.    albuterol-ipratropium (DUO-NEB) 2.5 mg-0.5 mg/3 mL nebulizer solution INHALE 1 VIAL PER NEUBULIZER NEBULIZER EVERY 4 HOURS    atorvastatin (LIPITOR) 20 MG tablet Take 1 tablet (20 mg total) by mouth every evening.    benzonatate (TESSALON) 200 MG capsule Take 200 mg by mouth 3 (three) times daily as needed.    bumetanide (BUMEX) 1 MG tablet Take 1 tablet (1 mg total) by mouth 2 (two) times daily.    dextroamphetamine-amphetamine (ADDERALL) 15 mg tablet     fluticasone (FLONASE) 50 mcg/actuation nasal spray 2 sprays (100 mcg total) by Each Nare route  once daily.    fluticasone-umeclidin-vilanter (TRELEGY ELLIPTA) 200-62.5-25 mcg inhaler Inhale 1 puff into the lungs.    folic acid (FOLVITE) 1 MG tablet Take 1,000 mcg by mouth once daily.    hydrOXYchloroQUINE (PLAQUENIL) 200 mg tablet Take 2 tablets by mouth once daily.    hydroxychloroquine (PLAQUENIL) 200 mg tablet TAKE 1 TABLET BY MOUTH TWICE A DAY    magnesium oxide (MAG-OX) 400 mg (241.3 mg magnesium) tablet Take 1 tablet (400 mg total) by mouth once daily.    montelukast (SINGULAIR) 10 mg tablet SMARTSI Tablet(s) By Mouth Every Evening    nystatin (MYCOSTATIN) 100,000 unit/mL suspension Take 5 mLs by mouth 4 (four) times daily.    OFEV 150 mg Cap TAKE 1 CAPSULE BY MOUTH TWICE A  DAY 12 HOURS APART WITH FOOD    potassium chloride SA (K-DUR,KLOR-CON) 20 MEQ tablet Take 1 tablet (20 mEq total) by mouth once daily.    predniSONE (DELTASONE) 5 MG tablet Take 3 tablets (15 mg total) by mouth once daily for 30 days, THEN 2 tablets (10 mg total) once daily for 30 days, THEN 1.5 tablets (7.5 mg total) once daily for 30 days, THEN 1 tablet (5 mg total) once daily.    promethazine (PHENERGAN) 6.25 mg/5 mL syrup SMARTSI.5-10 Milliliter(s) By Mouth 3 Times Daily    QUEtiapine (SEROQUEL) 50 MG tablet Take 50 mg by mouth every evening.    semaglutide, weight loss, (WEGOVY) 0.25 mg/0.5 mL PnIj Inject 0.25 mg into the skin every 7 days. Call office in 6 weeks to increase dose    sulfamethoxazole-trimethoprim 800-160mg (BACTRIM DS) 800-160 mg Tab TAKE 1 TABLET BY MOUTH THREE TIMES WEEKLY    vilazodone (VIIBRYD) 10 mg (7)- 20 mg (23) DsPk      Family History       Problem Relation (Age of Onset)    Cancer Maternal Aunt, Father          Tobacco Use    Smoking status: Never    Smokeless tobacco: Never   Substance and Sexual Activity    Alcohol use: Not Currently     Comment: social    Drug use: No    Sexual activity: Not Currently     ROS  Objective:     Vital Signs (Most Recent):    Vital Signs (24h Range):  Pulse:  [70]  "70  BP: (130)/(70) 130/70        There is no height or weight on file to calculate BMI.            No intake or output data in the 24 hours ending 09/26/24 0914    Lines/Drains/Airways       None                   Physical Exam  Constitutional:       Appearance: She is obese.   HENT:      Head: Atraumatic.   Eyes:      Extraocular Movements: Extraocular movements intact.   Cardiovascular:      Rate and Rhythm: Normal rate and regular rhythm.      Pulses: Normal pulses.      Heart sounds: Normal heart sounds.      Comments: JVP just above clavicle at 45 degrees.  Pulmonary:      Comments: Bilateral dry crackles heard diffusely, most prominent in lower lung fields.  Abdominal:      Palpations: Abdomen is soft.      Tenderness: There is no abdominal tenderness.   Musculoskeletal:         General: Normal range of motion.      Right lower leg: No edema.      Left lower leg: No edema.   Neurological:      General: No focal deficit present.      Mental Status: She is alert and oriented to person, place, and time.         Significant Labs: BMP: No results for input(s): "GLU", "NA", "K", "CL", "CO2", "BUN", "CREATININE", "CALCIUM", "MG" in the last 48 hours., CMP No results for input(s): "NA", "K", "CL", "CO2", "GLU", "BUN", "CREATININE", "CALCIUM", "PROT", "ALBUMIN", "BILITOT", "ALKPHOS", "AST", "ALT", "ANIONGAP", "ESTGFRAFRICA", "EGFRNONAA" in the last 48 hours., CBC No results for input(s): "WBC", "HGB", "HCT", "PLT" in the last 48 hours., and INR No results for input(s): "INR", "PROTIME" in the last 48 hours.    Significant Imaging: Echocardiogram: 2D echo with color flow doppler:   Results for orders placed or performed in visit on 11/05/19   2D echo with color flow doppler   Result Value Ref Range    EF + QEF 60 55 - 65    Diastolic Dysfunction No     Est. PA Systolic Pressure 32.59     Narrative    Date of Procedure: 11/05/2019        TEST DESCRIPTION   Technical Quality: This is a technically challenging study. There " is poor endocardial definition.     General: The patient was tachycardic throughout the study.    Aorta: The aortic root is normal in size, measuring 2.7 cm at sinotubular junction and 2.8 cm at Sinuses of Valsalva. The proximal ascending aorta is normal in size, measuring 3.1 cm across.     Left Atrium: The left atrial volume index is normal, measuring 14.27 cc/m2.     Left Ventricle: The left ventricle is normal in size, with an end-diastolic diameter of 3.4 cm, and an end-systolic diameter of 2.4 cm. Wall thickness is increased, with the septum measuring 1.5 cm and the posterior wall measuring 1.6 cm across. Relative   wall thickness was increased at 0.94, and the LV mass index was 103.2 g/m2 consistent with concentric remodeling. There are no regional wall motion abnormalities. Left ventricular systolic function appears normal. Visually estimated ejection fraction is   60-65%. The LV Doppler derived stroke volume equals 66.0 ccs.     Diastolic indices: E wave velocity 0.6 m/s, E/A ratio 0.7,  msec., E/e' ratio(avg) 5. Diastolic function is normal.     Right Atrium: The right atrium is normal in size, measuring 4.2 cm in length and 2.8 cm in width in the apical view.     Right Ventricle: The right ventricle is normal in size measuring 2.2 cm at the base in the apical right ventricle-focused view. Global right ventricular systolic function appears normal. Tricuspid annular plane systolic excursion (TAPSE) is 1.9 cm. The   estimated PA systolic pressure is 33 mmHg.     Aortic Valve:  Aortic valve is normal in structure with normal leaflet mobility.     Mitral Valve:  Mitral valve is normal in structure with normal leaflet mobility.     Tricuspid Valve:  Tricuspid valve is normal in structure with normal leaflet mobility.     Pulmonary Valve:  Pulmonary valve is normal in structure with normal leaflet mobility.     IVC: IVC is normal in size and collapses > 50% with a sniff, suggesting normal right atrial  "pressure of 3 mmHg.     Intracavitary: There is no evidence of pericardial effusion, intracavity mass, thrombi, or vegetation.         CONCLUSIONS     1 - Normal left ventricular systolic function (EF 60-65%).     2 - Normal left ventricular diastolic function.     3 - Normal right ventricular systolic function .             This document has been electronically    SIGNED BY: Devan Peng MD On: 11/05/2019 12:07    and Transthoracic echo (TTE) complete (Cupid Only):   Results for orders placed or performed during the hospital encounter of 09/26/24   Echo   Result Value Ref Range    LV Diastolic Volume 93.91 mL    Echo EF Estimated 67 %    LV Systolic Volume 31.14 mL    IVS 1.00 0.6 - 1.1 cm    LVIDd 4.53 3.5 - 6.0 cm    LVIDs 2.86 2.1 - 4.0 cm    LVOT diameter 2.26 cm    Posterior Wall 0.98 0.6 - 1.1 cm    IVRT 128.45 ms    AV LVOT peak gradient 3 mmHg    LVOT mn grad 2 mmHg    LVOT peak efraín 0.91 m/s    LVOT peak VTI 17.36 cm    RV- wise basal diam 3.8 cm    RV S' 9.80 cm/s    LA size 3.84 cm    Left Atrium Major Axis 4.67 cm    Left Atrium Minor Axis 4.39 cm    LA Vol (MOD) 61.74 mL    RA Major Axis 4.70 cm    RA area 14.3 cm2    AV valve area 2.93 cm2    AV area by cont VTI 2.9 cm2    AV peak gradient 6 mmHg    AV mean gradient 4 mmHg    Ao peak efraín 1.24 m/s    Ao VTI 23.73 cm    MV Peak A Efraín 0.50 m/s    E wave deceleration time 122.48 ms    MV Peak E Efraín 0.35 m/s    E/A ratio 0.70     LV LATERAL E/E' RATIO 4.38     LV SEPTAL E/E' RATIO 5.83     TDI LATERAL 0.08 m/s    TDI SEPTAL 0.06 m/s    TV peak gradient 33 mmHg    TR Max Efraín 2.88 m/s    Ascending aorta 3.28 cm    STJ 3.04 cm    P vein A 0.2 m/s    MV "A" wave duration 171.27 ms    Pulm vein "A" wave 171.27 ms    PV Peak D Efraín 0.40 m/s    PV Peak S Efraín 0.44 m/s    IVC diameter 1.65 cm    Sinus 3.22 cm    LA WIDTH 3.57 cm    RA Width 3.18 cm    TAPSE 1.52 cm    BSA 2.24 m2    LVOT stroke volume 69.60 cm3    LV Systolic Volume Index 14.6 mL/m2    LV Diastolic " Volume Index 43.88 mL/m2    LVOT area 4.0 cm2    FS 37 28 - 44 %    Left Ventricle Relative Wall Thickness 0.43 cm    LV mass 152.80 g    LV Mass Index 71 g/m2    E/E' ratio 5.00 m/s    LA Volume Index 24.6 mL/m2    LA volume 52.74 cm3    Pulm vein S/D ratio 1.10     LA Volume Index (Mod) 28.9 mL/m2    AV Velocity Ratio 0.73     AV index (prosthetic) 0.73     MICHELLE by Velocity Ratio 2.94 cm²    Triscuspid Valve Regurgitation Peak Gradient 33 mmHg    Mean e' 0.07 m/s    ZLVIDS -3.03     ZLVIDD -4.18     TV resting pulmonary artery pressure 36 mmHg    RV TB RVSP 6 mmHg    Est. RA pres 3 mmHg    TASV 9.0 cm/s    Narrative      Left Ventricle: The left ventricle is normal in size. Normal wall   thickness. There is concentric remodeling. There is normal systolic   function with a visually estimated ejection fraction of 55 - 60%. There is   indeterminate diastolic function.    Right Ventricle: Normal right ventricular cavity size. Wall thickness   is normal. Systolic function is mildly reduced.    Mitral Valve: There is mild regurgitation.    Tricuspid Valve: There is mild regurgitation.    Pericardium: There is a small posterior effusion.    Pulmonary Artery: The estimated pulmonary artery systolic pressure is   36 mmHg.    IVC/SVC: Normal venous pressure at 3 mmHg.       Assessment and Plan:     There are no hospital problems to display for this patient.      PH    VTE Risk Mitigation (From admission, onward)      None            Swapnil Steiner MD  Interventional Cardiology   [facility] - Short Stay Cardiac Unit

## 2024-09-26 NOTE — DISCHARGE SUMMARY
[facility] - Short Stay Cardiac Unit  Discharge Note  Short Stay    Procedure(s) (LRB):  INSERTION, CATHETER, RIGHT HEART (Right)      OUTCOME:  After discussing with her, the procedure cancelled as she has mild WHO group 3 PH, is intolerant of Tyvaso, and TTE shows stable PA pressures. Can consider repeat RHC if more therapies become available for her disease in the future.    DISPOSITION: Home or Self Care    FINAL DIAGNOSIS:  <principal problem not specified>    FOLLOWUP: In clinic    DISCHARGE INSTRUCTIONS:  No discharge procedures on file.     TIME SPENT ON DISCHARGE: 30 minutes

## 2024-09-26 NOTE — PROCEDURES
Daija Luna is a 44 y.o.   female patient, who presents for a 6 minute walk test ordered by MD Jatin.  The diagnosis is Qualify for Oxygen.  The patient's BMI is 39.9 kg/m2.  Predicted distance (lower limit of normal) is 378.33 meters.      Test Results:    The test was not completed.  The patient stopped 1 times for a total of 228 seconds.  The total time walked was 132 seconds.  During walking, the patient reported:  Dyspnea. The patient used no assistive devices  during testing.     09/26/2024---------Distance: 60.96 meters (200 feet)     O2 Sat % Supplemental Oxygen Heart Rate Blood Pressure Jonatan Scale   Pre-exercise  (Resting) 91 % Room Air 80 bpm 129/90 mmHg 0   During Exercise 72 % Room Air 132 bpm (!) 157/99 mmHg 7-8   Post-exercise  (Recovery) 90 % Room Air  87 bpm (!) 140/96 mmHg       Recovery Time: 240 seconds    Performing nurse/tech: Alexis CARRILLO      PREVIOUS STUDY:   The patient had a previous study.                                                                  Phase Oxygen Assessment Supplemental O2 Heart   Rate Blood Pressure Jonatan Dyspnea Scale Rating   Resting 97 % Room Air 100 bpm 126/71 3   Exercise             Minute             1 94 % Room Air 126 bpm       2 90 % Room Air 138 bpm       3 89 % Room Air 160 bpm       4 89 % Room Air 148 bpm       5 88 % (Patient placed on 2LNC) Room Air 150 bpm       6  95 % 2 L/M 129 bpm 131/61 4   Recovery             Minute             1 93 % 2 L/M 136 bpm       2 97 % 2 L/M 113 bpm       3 98 % 2 L/M 108 bpm       4 99 % 2 L/M 105 bpm 130/62 2    Total Time Walked (Calculated): 360 seconds  Final Partial Lap Distance (feet): 100 feet  Total Distance Meters (Calculated): 457.2 meters    CLINICAL INTERPRETATION:  Six minute walk distance is 60.96 meters (200 feet) with very, very heavy dyspnea.  During exercise, there was significant desaturation while breathing room air.  The patient did not report non-pulmonary symptoms during exercise.  The  patient did not complete the study, walking 132 seconds of the 360 second test.  The patient may benefit from using supplemental oxygen during exertion.  Since the previous study in 6/20/21, exercise capacity may be somewhat worse.  Based upon age and body mass index, exercise capacity is less than predicted.

## 2024-09-27 NOTE — PROGRESS NOTES
Daija Luna is a 44 y.o.   female patient, who presents for a 6 minute walk test ordered by MD Jatin.  The diagnosis is Qualify for Oxygen.  The patient's BMI is 39.9 kg/m2. Predicted distance (lower limit of normal) is 378.33 meters.    Test Results:    The test was not completed.  The patient stopped 1 times for a total of 228 seconds.  The total time walked was 132 seconds.  During walking, the patient reported:  Dyspnea. The patient used no assistive devices during testing.     09/27/2024---------Distance: 60.96 meters (200 feet)     O2 Sat % Supplemental Oxygen Heart Rate Blood Pressure Jonatan Scale   Pre-exercise  (Resting) 91 % Room Air 80 bpm 129/90  mmHg 0   During Exercise 72 % Room Air 132 bpm (!) 157/99  mmHg 7-8   Post-exercise   90 % Room Air  87 bpm (!) 140/96  mmHg       Recovery Time: 240 seconds    Oxygen Qualification:     O2 Sat % Supplemental Oxygen Heart Rate Blood Pressure Jonatan Scale   Pre-exercise  (Resting) 100 % 2 L/M  82 bpm  128/89    mmHg 0    During Exercise 88 %  2 L/M  128 bpm  157/89   mmHg 3    Post-exercise   94 %  2 L/M  99 bpm  138/88   mmHg         Recovery Time: 96 seconds    Performing nurse/tech: Alexis CARRILLO    PREVIOUS STUDY:   The patient has not had a previous study.      CLINICAL INTERPRETATION:  Six minute walk distance is 60.96 meters (200 feet) with very, very heavy dyspnea.  During exercise, there was significant desaturation while breathing room air.  The patient did not complete the study, walking 132 seconds of the 360 second test.  The patient may benefit from using supplemental oxygen during exertion.  No previous study performed.  Based upon age and body mass index, exercise capacity is less than predicted.   Oxygen saturation did improve while breathing supplemental oxygen.

## 2024-10-01 ENCOUNTER — OFFICE VISIT (OUTPATIENT)
Dept: CARDIOLOGY | Facility: CLINIC | Age: 44
End: 2024-10-01
Payer: COMMERCIAL

## 2024-10-01 ENCOUNTER — PATIENT MESSAGE (OUTPATIENT)
Dept: CARDIOLOGY | Facility: CLINIC | Age: 44
End: 2024-10-01

## 2024-10-01 DIAGNOSIS — G47.33 OSA (OBSTRUCTIVE SLEEP APNEA): ICD-10-CM

## 2024-10-01 DIAGNOSIS — E11.69 TYPE 2 DIABETES MELLITUS WITH OTHER SPECIFIED COMPLICATION, WITHOUT LONG-TERM CURRENT USE OF INSULIN: ICD-10-CM

## 2024-10-01 DIAGNOSIS — R06.02 SHORTNESS OF BREATH: ICD-10-CM

## 2024-10-01 DIAGNOSIS — M32.13 OTHER SYSTEMIC LUPUS ERYTHEMATOSUS WITH LUNG INVOLVEMENT: ICD-10-CM

## 2024-10-01 DIAGNOSIS — Z86.73 HISTORY OF TIA (TRANSIENT ISCHEMIC ATTACK): ICD-10-CM

## 2024-10-01 DIAGNOSIS — Z86.16 HISTORY OF COVID-19: ICD-10-CM

## 2024-10-01 DIAGNOSIS — E78.2 MIXED HYPERLIPIDEMIA: Primary | ICD-10-CM

## 2024-10-01 DIAGNOSIS — J98.4 MIXED RESTRICTIVE AND OBSTRUCTIVE LUNG DISEASE: ICD-10-CM

## 2024-10-01 DIAGNOSIS — J43.9 MIXED RESTRICTIVE AND OBSTRUCTIVE LUNG DISEASE: ICD-10-CM

## 2024-10-01 DIAGNOSIS — G47.30 SLEEP APNEA, UNSPECIFIED TYPE: ICD-10-CM

## 2024-10-01 DIAGNOSIS — R06.09 DOE (DYSPNEA ON EXERTION): ICD-10-CM

## 2024-10-01 DIAGNOSIS — J84.9 ILD (INTERSTITIAL LUNG DISEASE): ICD-10-CM

## 2024-10-01 DIAGNOSIS — I27.20 PULMONARY HYPERTENSION: ICD-10-CM

## 2024-10-01 DIAGNOSIS — R00.2 PALPITATION: ICD-10-CM

## 2024-10-01 RX ORDER — SEMAGLUTIDE 0.25 MG/.5ML
0.25 INJECTION, SOLUTION SUBCUTANEOUS
Qty: 3 ML | Refills: 1 | Status: SHIPPED | OUTPATIENT
Start: 2024-10-01

## 2024-10-01 RX ORDER — LANOLIN ALCOHOL/MO/W.PET/CERES
400 CREAM (GRAM) TOPICAL DAILY
Qty: 90 TABLET | Refills: 1 | Status: SHIPPED | OUTPATIENT
Start: 2024-10-01

## 2024-10-01 RX ORDER — BUMETANIDE 1 MG/1
1 TABLET ORAL 2 TIMES DAILY
Qty: 180 TABLET | Refills: 1 | Status: SHIPPED | OUTPATIENT
Start: 2024-10-01

## 2024-10-01 RX ORDER — POTASSIUM CHLORIDE 20 MEQ/1
20 TABLET, EXTENDED RELEASE ORAL DAILY
Qty: 90 TABLET | Refills: 1 | Status: SHIPPED | OUTPATIENT
Start: 2024-10-01

## 2024-10-01 RX ORDER — ATORVASTATIN CALCIUM 40 MG/1
40 TABLET, FILM COATED ORAL NIGHTLY
Qty: 90 TABLET | Refills: 1 | Status: SHIPPED | OUTPATIENT
Start: 2024-10-01 | End: 2025-10-01

## 2024-10-01 NOTE — PROGRESS NOTES
Subjective:   Patient ID:  Daija Luna is a 44 y.o. female who presents for cardiac consult of No chief complaint on file.    The patient location is: home  The chief complaint leading to consultation is: CV follow up    Visit type: audiovisual    Face to Face time with patient: 8 min  41 minutes of total time spent on the encounter, which includes face to face time and non-face to face time preparing to see the patient (eg, review of tests), Obtaining and/or reviewing separately obtained history, Documenting clinical information in the electronic or other health record, Independently interpreting results (not separately reported) and communicating results to the patient/family/caregiver, or Care coordination (not separately reported).         Each patient to whom he or she provides medical services by telemedicine is:  (1) informed of the relationship between the physician and patient and the respective role of any other health care provider with respect to management of the patient; and (2) notified that he or she may decline to receive medical services by telemedicine and may withdraw from such care at any time.    Notes:       The patient came in today for cardiac consult of No chief complaint on file.    Daija Luna is a 44 y.o. female pt with HTN, ADHD, obesity, Hypothyroidism, TIA - 2018, SLE, depression, ILD, h/o COVID 19 presents for follow up CV eval.         3/14/24  BP and HR stable. BMI 43 - 259 lbs. Lipids last year worse - Tc 303, . Tg 130.   Pt is more concerned about her heart lately - pt is more SOB, she has been to Pulm HTN clinic - trying to see heart vs lung causing issues.   She went to [facility] and was non compliant - She was started Tivaso    From pulm HTN clinic -   presents today after right heart catheterization was performed, results as noted above.  Has mild pulmonary hypertension, and based on pulmonary function testing, appears to be primarily related to his  interstitial lung disease.  Findings most consistent with who group 3 pulmonary hypertension.  Functional class 2-3 symptoms.  - appears euvolemic on exam today.  - will start Tyvaso.  Met with PH coordinators upon completion of visit.  - Continue FU with sleep medicine, pulmonology, and rheumatology    6/11/24  Bp stable. HR elevated. BMI 41 - 250 lbs   ECHO 6/2024 with normal bi V function, PASP 39 mmHg. Small peric effusion.     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 Can consider repeat RHC if more therapies become available for her disease in the future.    Trying to titrate steroids down, she may see endo for possible Cushings's, feels more sluggish  Wants to try Wegovy again - had some side effects after 1 dose.     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 Normal wall thickness. There is concentric remodeling. There is normal systolic function with a visually estimated ejection fraction of 55 - 60%. There is indeterminate diastolic function.    Right Ventricle: Normal right ventricular cavity size. Wall thickness is normal. Systolic function is mildly reduced.    Mitral Valve: There is mild regurgitation.    Tricuspid Valve: There is mild regurgitation.    Pericardium: There is a small posterior effusion.    Pulmonary Artery: The estimated pulmonary artery systolic pressure is 36 mmHg.    IVC/SVC: Normal venous pressure at 3 mmHg.      Results for orders placed during the hospital encounter of 09/26/24    Cardiac catheterization    Narrative  Aborted invasive cardiology procedure- see Procedure Log link for details.      Results for orders placed during the hospital encounter of  06/03/24    Echo    Interpretation Summary    Left Ventricle: The left ventricle is normal in size. Ventricular mass is normal. Mildly increased wall thickness. There is concentric remodeling. There is normal systolic function with a visually estimated ejection fraction of 55 - 60%. There is normal diastolic function.    Right Ventricle: Normal right ventricular cavity size. Wall thickness is normal. Systolic function is normal.    Pulmonary Artery: The estimated pulmonary artery systolic pressure is 39 mmHg.    IVC/SVC: Normal venous pressure at 3 mmHg.    Pericardium: There is a small effusion. No indication of cardiac tamponade.        Conclusion 10/2021      Normal myocardial perfusion scan. There is no evidence of myocardial ischemia or infarction.    There is a mild to moderate intensity defect in the anterior wall of the left ventricle, secondary to breast attenuation.    The gated perfusion images showed an ejection fraction of 76% at rest. The gated perfusion images showed an ejection fraction of 65% post stress.    The EKG portion of this study is negative for ischemia.    The patient reported no chest pain during the stress test.    There were no arrhythmias during stress.    Conclusion 10/2021    There were rare hookup related artifacts. Overall, the study was of good quality. The tape was adequate (2 days , 48 hours, 0 minutes).  Sinus rhythm with heart rates varying between 80 and 145 BPM with an average of 96 BPM.  There were PVCs totalling 0 and averaging 0 per hour.  There were PACs totalling 0 and averaging 0 per hour.    Conclusion 10/2021    There is no evidence of a right lower extremity DVT.  There is no evidence of a left lower extremity DVT.    Results for orders placed during the hospital encounter of 06/02/21    Echo Color Flow Doppler? Yes    Interpretation Summary  · The left ventricle is normal in size with moderate concentric hypertrophy and normal systolic function.  · The estimated  ejection fraction is 55%.  · Normal left ventricular diastolic function.  · Mild mitral regurgitation.  · Trivial posterior pericardial effusion.  · Normal central venous pressure (3 mmHg).  · The estimated PA systolic pressure is 34 mmHg.  · Normal right ventricular size with normal right ventricular systolic function.      Normal sinus rhythm   Right bundle branch block   Abnormal ECG   No previous ECGs available   Confirmed by KAISER WHYTE MD ([id number]) on 6/2/2021 11:22:29 PM     Past Medical History:   Diagnosis Date    ADHD (attention deficit hyperactivity disorder)     Asthma     Hypertension     Hypothyroidism     Mixed restrictive and obstructive lung disease     Pulmonary hypertension     Rheumatoid arthritis flare 06/22/2021    TIA (transient ischemic attack)        Past Surgical History:   Procedure Laterality Date    BRONCHOSCOPY Bilateral 8/16/2019    Procedure: Bronchoscopy;  Surgeon: Virgilio Weiss MD;  Location: [facility] ENDO;  Service: Endoscopy;  Laterality: Bilateral;    RIGHT HEART CATHETERIZATION Right 7/5/2023    Procedure: INSERTION, CATHETER, RIGHT HEART;  Surgeon: Kavon Kunz MD;  Location: [facility] CATH LAB;  Service: Cardiology;  Laterality: Right;       Social History     Tobacco Use    Smoking status: Never    Smokeless tobacco: Never   Substance Use Topics    Alcohol use: Not Currently     Comment: social    Drug use: No       Family History   Problem Relation Name Age of Onset    Cancer Maternal Aunt          Breast, Back, Lung Cancer    Cancer Father         Patient's Medications   New Prescriptions    No medications on file   Previous Medications    ALBUTEROL 90 MCG/ACTUATION INHALER    Inhale 2 puffs into the lungs every 6 (six) hours as needed for Wheezing.    ALBUTEROL-IPRATROPIUM (DUO-NEB) 2.5 MG-0.5 MG/3 ML NEBULIZER SOLUTION    INHALE 1 VIAL PER NEUBULIZER NEBULIZER EVERY 4 HOURS    BENZONATATE (TESSALON) 200 MG CAPSULE    Take 200 mg by mouth 3 (three) times daily as needed.     DEXTROAMPHETAMINE-AMPHETAMINE (ADDERALL) 15 MG TABLET        FLUTICASONE (FLONASE) 50 MCG/ACTUATION NASAL SPRAY    2 sprays (100 mcg total) by Each Nare route once daily.    FLUTICASONE-UMECLIDIN-VILANTER (TRELEGY ELLIPTA) 200-62.5-25 MCG INHALER    Inhale 1 puff into the lungs.    FOLIC ACID (FOLVITE) 1 MG TABLET    Take 1,000 mcg by mouth once daily.    HYDROXYCHLOROQUINE (PLAQUENIL) 200 MG TABLET    Take 2 tablets by mouth once daily.    HYDROXYCHLOROQUINE (PLAQUENIL) 200 MG TABLET    TAKE 1 TABLET BY MOUTH TWICE A DAY    MONTELUKAST (SINGULAIR) 10 MG TABLET    SMARTSI Tablet(s) By Mouth Every Evening    NYSTATIN (MYCOSTATIN) 100,000 UNIT/ML SUSPENSION    Take 5 mLs by mouth 4 (four) times daily.    OFEV 150 MG CAP    TAKE 1 CAPSULE BY MOUTH TWICE A  DAY 12 HOURS APART WITH FOOD    PREDNISONE (DELTASONE) 5 MG TABLET    Take 3 tablets (15 mg total) by mouth once daily for 30 days, THEN 2 tablets (10 mg total) once daily for 30 days, THEN 1.5 tablets (7.5 mg total) once daily for 30 days, THEN 1 tablet (5 mg total) once daily.    PROMETHAZINE (PHENERGAN) 6.25 MG/5 ML SYRUP    SMARTSI.5-10 Milliliter(s) By Mouth 3 Times Daily    QUETIAPINE (SEROQUEL) 50 MG TABLET    Take 50 mg by mouth every evening.    SULFAMETHOXAZOLE-TRIMETHOPRIM 800-160MG (BACTRIM DS) 800-160 MG TAB    TAKE 1 TABLET BY MOUTH THREE TIMES WEEKLY    VILAZODONE (VIIBRYD) 10 MG (7)- 20 MG (23) DSPK       Modified Medications    Modified Medication Previous Medication    ATORVASTATIN (LIPITOR) 40 MG TABLET atorvastatin (LIPITOR) 20 MG tablet       Take 1 tablet (40 mg total) by mouth every evening.    Take 1 tablet (20 mg total) by mouth every evening.    BUMETANIDE (BUMEX) 1 MG TABLET bumetanide (BUMEX) 1 MG tablet       Take 1 tablet (1 mg total) by mouth 2 (two) times daily.    Take 1 tablet (1 mg total) by mouth 2 (two) times daily.    MAGNESIUM OXIDE (MAG-OX) 400 MG (241.3 MG MAGNESIUM) TABLET magnesium oxide (MAG-OX) 400 mg (241.3 mg  magnesium) tablet       Take 1 tablet (400 mg total) by mouth once daily.    Take 1 tablet (400 mg total) by mouth once daily.    POTASSIUM CHLORIDE SA (K-DUR,KLOR-CON) 20 MEQ TABLET potassium chloride SA (K-DUR,KLOR-CON) 20 MEQ tablet       Take 1 tablet (20 mEq total) by mouth once daily.    Take 1 tablet (20 mEq total) by mouth once daily.    SEMAGLUTIDE, WEIGHT LOSS, (WEGOVY) 0.25 MG/0.5 ML PNIJ semaglutide, weight loss, (WEGOVY) 0.25 mg/0.5 mL PnIj       Inject 0.25 mg into the skin every 7 days. Call office in 6 weeks to increase dose    Inject 0.25 mg into the skin every 7 days. Call office in 6 weeks to increase dose   Discontinued Medications    No medications on file       Review of Systems   Constitutional:  Positive for malaise/fatigue.   HENT: Negative.     Eyes: Negative.    Respiratory:  Positive for shortness of breath.    Cardiovascular:  Positive for palpitations and leg swelling.   Gastrointestinal: Negative.    Genitourinary: Negative.    Musculoskeletal: Negative.    Skin: Negative.    Neurological: Negative.    Endo/Heme/Allergies: Negative.    Psychiatric/Behavioral: Negative.     All 12 systems otherwise negative.      Wt Readings from Last 3 Encounters:   09/26/24 108.8 kg (239 lb 12.8 oz)   09/26/24 109.3 kg (241 lb)   09/24/24 109.4 kg (241 lb 2.9 oz)     Temp Readings from Last 3 Encounters:   09/24/24 98.2 °F (36.8 °C)   06/25/24 98.4 °F (36.9 °C)   04/24/24 96.9 °F (36.1 °C)     BP Readings from Last 3 Encounters:   09/26/24 130/70   09/24/24 133/71   06/25/24 (!) 137/98     Pulse Readings from Last 3 Encounters:   09/26/24 70   09/24/24 68   06/25/24 78       There were no vitals taken for this visit.    Objective:   Physical Exam  Constitutional:       General: She is not in acute distress.     Appearance: She is well-developed. She is not diaphoretic.   HENT:      Head: Normocephalic and atraumatic.      Mouth/Throat:      Pharynx: No oropharyngeal exudate.   Eyes:      General: No  scleral icterus.        Right eye: No discharge.         Left eye: No discharge.   Pulmonary:      Effort: Pulmonary effort is normal. No respiratory distress.   Skin:     Coloration: Skin is not pale.      Findings: No erythema or rash.   Neurological:      Mental Status: She is alert and oriented to person, place, and time.   Psychiatric:         Behavior: Behavior normal.         Thought Content: Thought content normal.         Judgment: Judgment normal.         Lab Results   Component Value Date     09/26/2024    K 3.5 09/26/2024     09/26/2024    CO2 26 09/26/2024    BUN 12 09/26/2024    CREATININE 1.0 09/26/2024    GLU 74 09/26/2024    HGBA1C 6.1 (H) 04/27/2023    MG 2.1 09/26/2024    AST 29 09/26/2024    ALT 36 09/26/2024    ALBUMIN 3.5 09/26/2024    PROT 7.1 09/26/2024    BILITOT 0.2 09/26/2024    WBC 13.79 (H) 09/26/2024    HGB 10.8 (L) 09/26/2024    HCT 37.9 09/26/2024    MCV 84 09/26/2024     09/26/2024    INR 1.0 07/05/2023    TSH 1.687 10/12/2021    CHOL 307 (H) 03/14/2024    HDL 67 03/14/2024    LDLCALC 217.8 (H) 03/14/2024    LDLCALC 220 (H) 07/25/2019    TRIG 111 03/14/2024    BNP <10 09/26/2024     Assessment:      1. Mixed hyperlipidemia    2. History of TIA (transient ischemic attack)    3. BMI 40.0-44.9, adult    4. Type 2 diabetes mellitus with other specified complication, without long-term current use of insulin    5. ILD (interstitial lung disease)            Plan:   1. HTN with edema  - titrate meds  - LE u/s neg for DVT 10/2021.   - rec compression stockings    2. HLD with elevated TGs  - uncontrolled   - cont meds  - low minerva diet  - Lipids last year worse - Tc 303, . Tg 130.   - increase Lipitor 20mg  to 40 mg    3. ILD with restrictive and obstructive lung disease  - cont tx per pulm    4. SLE  - cont tx per PCP/rheum    5. H/O TIA  - rec asa, statin  - was on statin in past but caused interaction with lupus meds  - f/u neuro  - add zetia    6. BRADY,  palpitations, h/o PVCs; h/o COVID 19  - was admitted to [facility]  -had   - Pulm HTN - discussed RHC if symptoms are worse,  referral to PHTN clinic - f/u as needed  - r/o EMELIA   - Prior Holter inconclusive as pt did not wear it more than a few hours.   - does not want to take verapamil with Ofev due to potential side effects   - ECHO 6/2024 with normal bi V function, PASP 39 mmHg. Small peric effusion.     7. ADHD  - cont tx if needed - stimulants     8. Obesity, BMI 44 - 266 lbs, PreDM -->  BMI 43 - 259 lbs --> BMI 41 - 250 lbs   --> 239 lbs   - cont weight loss     9. DM2, PreDm  - took Wegovy once and felt very bad - will restart      Visit today included increased complexity associated with the care of the episodic problem dyspnea addressed and managing the longitudinal care of the patient due to the serious and/or complex managed problem(s) .      Thank you for allowing me to participate in this patient's care. Please do not hesitate to contact me with any questions or concerns. Consult note has been forwarded to the referral physician.

## 2024-10-09 ENCOUNTER — PATIENT MESSAGE (OUTPATIENT)
Dept: RHEUMATOLOGY | Facility: CLINIC | Age: 44
End: 2024-10-09
Payer: COMMERCIAL

## 2024-10-11 ENCOUNTER — PATIENT MESSAGE (OUTPATIENT)
Dept: CARDIOLOGY | Facility: CLINIC | Age: 44
End: 2024-10-11
Payer: COMMERCIAL

## 2024-10-14 RX ORDER — NIFEDIPINE 30 MG/1
30 TABLET, EXTENDED RELEASE ORAL DAILY
Qty: 30 TABLET | Refills: 1 | Status: SHIPPED | OUTPATIENT
Start: 2024-10-14 | End: 2025-10-14

## 2024-10-24 ENCOUNTER — INFUSION (OUTPATIENT)
Dept: INFUSION THERAPY | Facility: HOSPITAL | Age: 44
End: 2024-10-24
Attending: INTERNAL MEDICINE
Payer: COMMERCIAL

## 2024-10-24 ENCOUNTER — LAB VISIT (OUTPATIENT)
Dept: LAB | Facility: HOSPITAL | Age: 44
End: 2024-10-24
Attending: INTERNAL MEDICINE
Payer: COMMERCIAL

## 2024-10-24 ENCOUNTER — PATIENT MESSAGE (OUTPATIENT)
Dept: GASTROENTEROLOGY | Facility: CLINIC | Age: 44
End: 2024-10-24
Payer: COMMERCIAL

## 2024-10-24 VITALS
BODY MASS INDEX: 40.18 KG/M2 | RESPIRATION RATE: 16 BRPM | TEMPERATURE: 97 F | OXYGEN SATURATION: 98 % | WEIGHT: 241.19 LBS | HEART RATE: 80 BPM | SYSTOLIC BLOOD PRESSURE: 140 MMHG | HEIGHT: 65 IN | DIASTOLIC BLOOD PRESSURE: 74 MMHG

## 2024-10-24 DIAGNOSIS — J84.9 ILD (INTERSTITIAL LUNG DISEASE): ICD-10-CM

## 2024-10-24 DIAGNOSIS — M06.9 RHEUMATOID ARTHRITIS FLARE: Primary | ICD-10-CM

## 2024-10-24 DIAGNOSIS — M32.13 OTHER SYSTEMIC LUPUS ERYTHEMATOSUS WITH LUNG INVOLVEMENT: ICD-10-CM

## 2024-10-24 DIAGNOSIS — Z79.899 HIGH RISK MEDICATION USE: ICD-10-CM

## 2024-10-24 LAB
ALBUMIN SERPL BCP-MCNC: 3.4 G/DL (ref 3.5–5.2)
ALP SERPL-CCNC: 117 U/L (ref 40–150)
ALT SERPL W/O P-5'-P-CCNC: 43 U/L (ref 10–44)
ANION GAP SERPL CALC-SCNC: 11 MMOL/L (ref 8–16)
AST SERPL-CCNC: 36 U/L (ref 10–40)
B-HCG UR QL: NEGATIVE
BASOPHILS # BLD AUTO: 0.07 K/UL (ref 0–0.2)
BASOPHILS NFR BLD: 0.6 % (ref 0–1.9)
BILIRUB SERPL-MCNC: 0.2 MG/DL (ref 0.1–1)
BUN SERPL-MCNC: 8 MG/DL (ref 6–20)
CALCIUM SERPL-MCNC: 9.2 MG/DL (ref 8.7–10.5)
CHLORIDE SERPL-SCNC: 104 MMOL/L (ref 95–110)
CO2 SERPL-SCNC: 24 MMOL/L (ref 23–29)
CREAT SERPL-MCNC: 1.1 MG/DL (ref 0.5–1.4)
DIFFERENTIAL METHOD BLD: ABNORMAL
EOSINOPHIL # BLD AUTO: 0.3 K/UL (ref 0–0.5)
EOSINOPHIL NFR BLD: 2.6 % (ref 0–8)
ERYTHROCYTE [DISTWIDTH] IN BLOOD BY AUTOMATED COUNT: 22.2 % (ref 11.5–14.5)
EST. GFR  (NO RACE VARIABLE): >60 ML/MIN/1.73 M^2
GLUCOSE SERPL-MCNC: 82 MG/DL (ref 70–110)
HCT VFR BLD AUTO: 38.3 % (ref 37–48.5)
HGB BLD-MCNC: 11.5 G/DL (ref 12–16)
IMM GRANULOCYTES # BLD AUTO: 0.03 K/UL (ref 0–0.04)
IMM GRANULOCYTES NFR BLD AUTO: 0.3 % (ref 0–0.5)
LYMPHOCYTES # BLD AUTO: 3.4 K/UL (ref 1–4.8)
LYMPHOCYTES NFR BLD: 28.3 % (ref 18–48)
MCH RBC QN AUTO: 25.3 PG (ref 27–31)
MCHC RBC AUTO-ENTMCNC: 30 G/DL (ref 32–36)
MCV RBC AUTO: 84 FL (ref 82–98)
MONOCYTES # BLD AUTO: 1 K/UL (ref 0.3–1)
MONOCYTES NFR BLD: 8.1 % (ref 4–15)
NEUTROPHILS # BLD AUTO: 7.2 K/UL (ref 1.8–7.7)
NEUTROPHILS NFR BLD: 60.1 % (ref 38–73)
NRBC BLD-RTO: 0 /100 WBC
PLATELET # BLD AUTO: 314 K/UL (ref 150–450)
PMV BLD AUTO: 10.2 FL (ref 9.2–12.9)
POTASSIUM SERPL-SCNC: 4.2 MMOL/L (ref 3.5–5.1)
PROT SERPL-MCNC: 7.3 G/DL (ref 6–8.4)
RBC # BLD AUTO: 4.54 M/UL (ref 4–5.4)
SODIUM SERPL-SCNC: 139 MMOL/L (ref 136–145)
WBC # BLD AUTO: 11.94 K/UL (ref 3.9–12.7)

## 2024-10-24 PROCEDURE — 96366 THER/PROPH/DIAG IV INF ADDON: CPT

## 2024-10-24 PROCEDURE — 25000003 PHARM REV CODE 250: Performed by: INTERNAL MEDICINE

## 2024-10-24 PROCEDURE — 63600175 PHARM REV CODE 636 W HCPCS: Performed by: INTERNAL MEDICINE

## 2024-10-24 PROCEDURE — 96375 TX/PRO/DX INJ NEW DRUG ADDON: CPT

## 2024-10-24 PROCEDURE — 36415 COLL VENOUS BLD VENIPUNCTURE: CPT | Performed by: INTERNAL MEDICINE

## 2024-10-24 PROCEDURE — 96365 THER/PROPH/DIAG IV INF INIT: CPT

## 2024-10-24 PROCEDURE — 81025 URINE PREGNANCY TEST: CPT | Performed by: INTERNAL MEDICINE

## 2024-10-24 PROCEDURE — 85025 COMPLETE CBC W/AUTO DIFF WBC: CPT | Performed by: INTERNAL MEDICINE

## 2024-10-24 PROCEDURE — 80053 COMPREHEN METABOLIC PANEL: CPT | Performed by: INTERNAL MEDICINE

## 2024-10-24 RX ORDER — DIPHENHYDRAMINE HYDROCHLORIDE 50 MG/ML
25 INJECTION INTRAMUSCULAR; INTRAVENOUS
Status: COMPLETED | OUTPATIENT
Start: 2024-10-24 | End: 2024-10-24

## 2024-10-24 RX ORDER — FAMOTIDINE 10 MG/ML
20 INJECTION INTRAVENOUS
Status: COMPLETED | OUTPATIENT
Start: 2024-10-24 | End: 2024-10-24

## 2024-10-24 RX ORDER — DIPHENHYDRAMINE HYDROCHLORIDE 50 MG/ML
25 INJECTION INTRAMUSCULAR; INTRAVENOUS
Start: 2024-11-05

## 2024-10-24 RX ORDER — SODIUM CHLORIDE 0.9 % (FLUSH) 0.9 %
10 SYRINGE (ML) INJECTION
OUTPATIENT
Start: 2024-11-05

## 2024-10-24 RX ORDER — METHYLPREDNISOLONE SOD SUCC 125 MG
100 VIAL (EA) INJECTION
Status: COMPLETED | OUTPATIENT
Start: 2024-10-24 | End: 2024-10-24

## 2024-10-24 RX ORDER — METHYLPREDNISOLONE SOD SUCC 125 MG
100 VIAL (EA) INJECTION
Start: 2024-11-05

## 2024-10-24 RX ORDER — FAMOTIDINE 10 MG/ML
20 INJECTION INTRAVENOUS
OUTPATIENT
Start: 2024-11-05

## 2024-10-24 RX ORDER — ACETAMINOPHEN 325 MG/1
650 TABLET ORAL
Status: COMPLETED | OUTPATIENT
Start: 2024-10-24 | End: 2024-10-24

## 2024-10-24 RX ORDER — HEPARIN 100 UNIT/ML
500 SYRINGE INTRAVENOUS
OUTPATIENT
Start: 2024-11-05

## 2024-10-24 RX ORDER — ACETAMINOPHEN 325 MG/1
650 TABLET ORAL
OUTPATIENT
Start: 2024-11-05

## 2024-10-24 RX ADMIN — ACETAMINOPHEN 650 MG: 325 TABLET ORAL at 10:10

## 2024-10-24 RX ADMIN — RITUXIMAB 1000 MG: 10 INJECTION, SOLUTION INTRAVENOUS at 10:10

## 2024-10-24 RX ADMIN — FAMOTIDINE 20 MG: 10 INJECTION INTRAVENOUS at 10:10

## 2024-10-24 RX ADMIN — METHYLPREDNISOLONE SODIUM SUCCINATE 100 MG: 125 INJECTION, POWDER, FOR SOLUTION INTRAMUSCULAR; INTRAVENOUS at 09:10

## 2024-10-24 RX ADMIN — DIPHENHYDRAMINE HYDROCHLORIDE 25 MG: 50 INJECTION INTRAMUSCULAR; INTRAVENOUS at 10:10

## 2024-10-24 NOTE — DISCHARGE INSTRUCTIONS
FALL PREVENTION   Falls often occur due to slipping, tripping or losing your balance. Here are ways to reduce your risk of falling again.   Was there anything that caused your fall that can be fixed, removed or replaced?   Make your home safe by keeping walkways clear of objects you may trip over.   Use non-slip pads under rugs.   Do not walk in poorly lit areas.   Do not stand on chairs or wobbly ladders.   Use caution when reaching overhead or looking upward. This position can cause a loss of balance.   Be sure your shoes fit properly, have non-slip bottoms and are in good condition.   Be cautious when going up and down stairs, curbs, and when walking on uneven sidewalks.   If your balance is poor, consider using a cane or walker.   If your fall was related to alcohol use, stop or limit alcohol intake.   If your fall was related to use of sleeping medicines, talk to your doctor about this. You may need to reduce your dosage at bedtime if you awaken during the night to go to the bathroom.   To reduce the need for nighttime bathroom trips:   Avoid drinking fluids for several hours before going to bed   Empty your bladder before going to bed   Men can keep a urinal at the bedside   © 6737-3774 [facility], [address] [place] [address], [place] [id number]. All rights reserved. This information is not intended as a substitute for professional medical care. Always follow your healthcare professional's instructions.WAYS TO HELP PREVENT INFECTION        WASH YOUR HANDS OFTEN DURING THE DAY, ESPECIALLY BEFORE YOU EAT, AFTER USING THE BATHROOM, AND AFTER TOUCHING ANIMALS    STAY AWAY FROM PEOPLE WHO HAVE ILLNESSES YOU CAN CATCH; SUCH AS COLDS, FLU, CHICKEN POX    TRY TO AVOID CROWDS    STAY AWAY FROM CHILDREN WHO RECENTLY HAVE RECEIVED LIVE VIRUS VACCINES    MAINTAIN GOOD MOUTH CARE    DO NOT SQUEEZE OR SCRATCH PIMPLES    CLEAN CUTS & SCRAPES RIGHT AWAY AND DAILY UNTIL HEALED WITH WARM WATER, SOAP & AN ANTISEPTIC    AVOID  CONTACT WITH LITTER BOXES, BIRD CAGES, & FISH TANKS    AVOID STANDING WATER, IE., BIRD BATHS, FLOWER POTS/VASES, OR HUMIDIFIERS    WEAR GLOVES WHEN GARDENING OR CLEANING UP AFTER OTHERS, ESPECIALLY BABIES & SMALL CHILDREN    DO NOT EAT RAW FISH, SEAFOOD, MEAT, OR EGGS

## 2024-10-24 NOTE — PLAN OF CARE
POC reviewed with patient. All needs and concerns addressed.   Problem: Adult Inpatient Plan of Care  Goal: Plan of Care Review  Outcome: Progressing  Flowsheets (Taken 10/24/2024 1201)  Plan of Care Reviewed With: patient  Goal: Patient-Specific Goal (Individualized)  Outcome: Progressing  Flowsheets (Taken 10/24/2024 1201)  Individualized Care Needs: In recliner feet elevated with warm blankets.  Anxieties, Fears or Concerns: Pt said her daughter currently has flu and covid but she is asymptomatic  Patient/Family-Specific Goals (Include Timeframe): Tolerate rituxan today.  Goal: Absence of Hospital-Acquired Illness or Injury  Outcome: Progressing  Goal: Optimal Comfort and Wellbeing  Outcome: Progressing  Intervention: Provide Person-Centered Care  Flowsheets (Taken 10/24/2024 1201)  Trust Relationship/Rapport:   care explained   questions encouraged   choices provided   reassurance provided   emotional support provided   thoughts/feelings acknowledged   empathic listening provided   questions answered     Problem: Infection  Goal: Absence of Infection Signs and Symptoms  Outcome: Progressing  Intervention: Prevent or Manage Infection  Flowsheets (Taken 10/24/2024 1201)  Infection Management: aseptic technique maintained

## 2024-11-08 RX ORDER — NIFEDIPINE 30 MG/1
30 TABLET, EXTENDED RELEASE ORAL
Qty: 90 TABLET | Refills: 1 | Status: SHIPPED | OUTPATIENT
Start: 2024-11-08

## 2024-11-14 ENCOUNTER — PATIENT MESSAGE (OUTPATIENT)
Dept: RHEUMATOLOGY | Facility: CLINIC | Age: 44
End: 2024-11-14
Payer: COMMERCIAL

## 2024-11-14 DIAGNOSIS — M35.1 MCTD (MIXED CONNECTIVE TISSUE DISEASE): Primary | ICD-10-CM

## 2024-11-14 RX ORDER — BACLOFEN 10 MG/1
10 TABLET ORAL 3 TIMES DAILY
Qty: 90 TABLET | Refills: 11 | Status: SHIPPED | OUTPATIENT
Start: 2024-11-14 | End: 2025-11-14

## 2024-12-02 NOTE — TELEPHONE ENCOUNTER
NN received an email from [facility] that they have not been able to reach patient for Tyvaso teaching. Message was sent to patient via portal and message was left on patients VM to call [facility]/ Simon back.    Detail Level: Generalized Quality 130: Documentation Of Current Medications In The Medical Record: Current Medications Documented Quality 226: Preventive Care And Screening: Tobacco Use: Screening And Cessation Intervention: Patient screened for tobacco use and is an ex/non-smoker

## 2024-12-17 ENCOUNTER — PATIENT MESSAGE (OUTPATIENT)
Dept: INFUSION THERAPY | Facility: HOSPITAL | Age: 44
End: 2024-12-17
Payer: COMMERCIAL

## 2024-12-23 DIAGNOSIS — I10 ESSENTIAL HYPERTENSION, BENIGN: Primary | ICD-10-CM

## 2024-12-23 DIAGNOSIS — R00.2 PALPITATION: ICD-10-CM

## 2024-12-23 DIAGNOSIS — R94.31 ABNORMAL EKG: ICD-10-CM

## 2025-01-15 DIAGNOSIS — R00.2 PALPITATION: ICD-10-CM

## 2025-01-15 DIAGNOSIS — I10 ESSENTIAL HYPERTENSION, BENIGN: Primary | ICD-10-CM

## 2025-01-16 DIAGNOSIS — I10 ESSENTIAL HYPERTENSION, BENIGN: Primary | ICD-10-CM

## 2025-01-16 DIAGNOSIS — R00.2 PALPITATION: ICD-10-CM

## 2025-01-29 ENCOUNTER — PATIENT MESSAGE (OUTPATIENT)
Dept: RHEUMATOLOGY | Facility: CLINIC | Age: 45
End: 2025-01-29
Payer: COMMERCIAL

## 2025-02-05 ENCOUNTER — PATIENT MESSAGE (OUTPATIENT)
Dept: INFUSION THERAPY | Facility: HOSPITAL | Age: 45
End: 2025-02-05
Payer: COMMERCIAL

## 2025-02-05 ENCOUNTER — PATIENT MESSAGE (OUTPATIENT)
Dept: RHEUMATOLOGY | Facility: CLINIC | Age: 45
End: 2025-02-05
Payer: COMMERCIAL

## 2025-02-05 DIAGNOSIS — D84.9 IMMUNOSUPPRESSED STATUS: Primary | ICD-10-CM

## 2025-02-06 ENCOUNTER — LAB VISIT (OUTPATIENT)
Dept: LAB | Facility: HOSPITAL | Age: 45
End: 2025-02-06
Attending: INTERNAL MEDICINE
Payer: COMMERCIAL

## 2025-02-06 ENCOUNTER — OFFICE VISIT (OUTPATIENT)
Dept: RHEUMATOLOGY | Facility: CLINIC | Age: 45
End: 2025-02-06
Payer: COMMERCIAL

## 2025-02-06 ENCOUNTER — PATIENT MESSAGE (OUTPATIENT)
Dept: CARDIOLOGY | Facility: HOSPITAL | Age: 45
End: 2025-02-06
Payer: COMMERCIAL

## 2025-02-06 DIAGNOSIS — J84.9 ILD (INTERSTITIAL LUNG DISEASE): ICD-10-CM

## 2025-02-06 DIAGNOSIS — M32.13 OTHER SYSTEMIC LUPUS ERYTHEMATOSUS WITH LUNG INVOLVEMENT: ICD-10-CM

## 2025-02-06 DIAGNOSIS — Z51.81 ENCOUNTER FOR MEDICATION MONITORING: Primary | Chronic | ICD-10-CM

## 2025-02-06 DIAGNOSIS — I27.21 PAH (PULMONARY ARTERY HYPERTENSION) WITH CONNECTIVE TISSUE DISEASE: ICD-10-CM

## 2025-02-06 DIAGNOSIS — Z79.899 HIGH RISK MEDICATION USE: ICD-10-CM

## 2025-02-06 DIAGNOSIS — M35.1 MCTD (MIXED CONNECTIVE TISSUE DISEASE): ICD-10-CM

## 2025-02-06 DIAGNOSIS — E03.9 HYPOTHYROIDISM, ADULT: ICD-10-CM

## 2025-02-06 DIAGNOSIS — I27.20 PULMONARY HYPERTENSION: ICD-10-CM

## 2025-02-06 DIAGNOSIS — D84.9 IMMUNOSUPPRESSED STATUS: ICD-10-CM

## 2025-02-06 DIAGNOSIS — M06.00 SERONEGATIVE RHEUMATOID ARTHRITIS: ICD-10-CM

## 2025-02-06 DIAGNOSIS — R76.8 ANTI-RNP ANTIBODIES PRESENT: ICD-10-CM

## 2025-02-06 DIAGNOSIS — M35.9 PAH (PULMONARY ARTERY HYPERTENSION) WITH CONNECTIVE TISSUE DISEASE: ICD-10-CM

## 2025-02-06 DIAGNOSIS — D84.821 DRUG-INDUCED IMMUNODEFICIENCY: ICD-10-CM

## 2025-02-06 DIAGNOSIS — Z79.899 DRUG-INDUCED IMMUNODEFICIENCY: ICD-10-CM

## 2025-02-06 LAB
ALBUMIN SERPL BCP-MCNC: 3.5 G/DL (ref 3.5–5.2)
ALP SERPL-CCNC: 101 U/L (ref 40–150)
ALT SERPL W/O P-5'-P-CCNC: 17 U/L (ref 10–44)
ANION GAP SERPL CALC-SCNC: 10 MMOL/L (ref 8–16)
AST SERPL-CCNC: 31 U/L (ref 10–40)
BASOPHILS # BLD AUTO: 0.08 K/UL (ref 0–0.2)
BASOPHILS NFR BLD: 0.8 % (ref 0–1.9)
BILIRUB SERPL-MCNC: 0.3 MG/DL (ref 0.1–1)
BNP SERPL-MCNC: <10 PG/ML (ref 0–99)
BUN SERPL-MCNC: 10 MG/DL (ref 6–20)
CALCIUM SERPL-MCNC: 9.3 MG/DL (ref 8.7–10.5)
CHLORIDE SERPL-SCNC: 106 MMOL/L (ref 95–110)
CO2 SERPL-SCNC: 26 MMOL/L (ref 23–29)
CREAT SERPL-MCNC: 1 MG/DL (ref 0.5–1.4)
CRP SERPL-MCNC: 4.6 MG/L (ref 0–8.2)
DIFFERENTIAL METHOD BLD: ABNORMAL
EOSINOPHIL # BLD AUTO: 1.9 K/UL (ref 0–0.5)
EOSINOPHIL NFR BLD: 18.3 % (ref 0–8)
ERYTHROCYTE [DISTWIDTH] IN BLOOD BY AUTOMATED COUNT: 17.2 % (ref 11.5–14.5)
ERYTHROCYTE [SEDIMENTATION RATE] IN BLOOD BY WESTERGREN METHOD: >120 MM/HR (ref 0–36)
EST. GFR  (NO RACE VARIABLE): >60 ML/MIN/1.73 M^2
GLUCOSE SERPL-MCNC: 112 MG/DL (ref 70–110)
HCT VFR BLD AUTO: 32 % (ref 37–48.5)
HGB BLD-MCNC: 9.8 G/DL (ref 12–16)
IMM GRANULOCYTES # BLD AUTO: 0.02 K/UL (ref 0–0.04)
IMM GRANULOCYTES NFR BLD AUTO: 0.2 % (ref 0–0.5)
LYMPHOCYTES # BLD AUTO: 2.1 K/UL (ref 1–4.8)
LYMPHOCYTES NFR BLD: 20.9 % (ref 18–48)
MCH RBC QN AUTO: 25.6 PG (ref 27–31)
MCHC RBC AUTO-ENTMCNC: 30.6 G/DL (ref 32–36)
MCV RBC AUTO: 84 FL (ref 82–98)
MONOCYTES # BLD AUTO: 0.8 K/UL (ref 0.3–1)
MONOCYTES NFR BLD: 7.6 % (ref 4–15)
NEUTROPHILS # BLD AUTO: 5.3 K/UL (ref 1.8–7.7)
NEUTROPHILS NFR BLD: 52.2 % (ref 38–73)
NRBC BLD-RTO: 0 /100 WBC
PLATELET # BLD AUTO: 361 K/UL (ref 150–450)
PMV BLD AUTO: 11.3 FL (ref 9.2–12.9)
POTASSIUM SERPL-SCNC: 3 MMOL/L (ref 3.5–5.1)
PROT SERPL-MCNC: 7.4 G/DL (ref 6–8.4)
RBC # BLD AUTO: 3.83 M/UL (ref 4–5.4)
SODIUM SERPL-SCNC: 142 MMOL/L (ref 136–145)
WBC # BLD AUTO: 10.12 K/UL (ref 3.9–12.7)

## 2025-02-06 PROCEDURE — 80053 COMPREHEN METABOLIC PANEL: CPT | Performed by: INTERNAL MEDICINE

## 2025-02-06 PROCEDURE — 83880 ASSAY OF NATRIURETIC PEPTIDE: CPT | Performed by: INTERNAL MEDICINE

## 2025-02-06 PROCEDURE — 82784 ASSAY IGA/IGD/IGG/IGM EACH: CPT | Performed by: INTERNAL MEDICINE

## 2025-02-06 PROCEDURE — 87340 HEPATITIS B SURFACE AG IA: CPT | Performed by: INTERNAL MEDICINE

## 2025-02-06 PROCEDURE — 86140 C-REACTIVE PROTEIN: CPT | Performed by: INTERNAL MEDICINE

## 2025-02-06 PROCEDURE — 36415 COLL VENOUS BLD VENIPUNCTURE: CPT | Mod: PN | Performed by: INTERNAL MEDICINE

## 2025-02-06 PROCEDURE — 98007 SYNCH AUDIO-VIDEO EST HI 40: CPT | Mod: 95,,, | Performed by: INTERNAL MEDICINE

## 2025-02-06 PROCEDURE — 1160F RVW MEDS BY RX/DR IN RCRD: CPT | Mod: CPTII,95,, | Performed by: INTERNAL MEDICINE

## 2025-02-06 PROCEDURE — 85025 COMPLETE CBC W/AUTO DIFF WBC: CPT | Performed by: INTERNAL MEDICINE

## 2025-02-06 PROCEDURE — 85651 RBC SED RATE NONAUTOMATED: CPT | Performed by: INTERNAL MEDICINE

## 2025-02-06 PROCEDURE — 1159F MED LIST DOCD IN RCRD: CPT | Mod: CPTII,95,, | Performed by: INTERNAL MEDICINE

## 2025-02-06 PROCEDURE — 86704 HEP B CORE ANTIBODY TOTAL: CPT | Performed by: INTERNAL MEDICINE

## 2025-02-06 PROCEDURE — G2211 COMPLEX E/M VISIT ADD ON: HCPCS | Mod: 95,,, | Performed by: INTERNAL MEDICINE

## 2025-02-06 NOTE — PROGRESS NOTES
RHEUMATOLOGY FOLLOW UP - TELE VISIT     The patient location is: [place]  The chief complaint leading to consultation is:  Follow-up for mixed connective tissue disease  Visit type: Virtual visit with synchronous audio and video  Total time spent with patient:  15 minutes  Each patient to whom he or she provides medical services by telemedicine is:  (1) informed of the relationship between the physician and patient and the respective role of any other health care provider with respect to management of the patient; and (2) notified that he or she may decline to receive medical services by telemedicine and may withdraw from such care at any time.      Chief complaints, HPI, ROS, EXAM, Assessment & Plans:-  Daija Garcia a 44 y.o. 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  She is on rituximab and Plaquenil for arthritis.   Failed CellCept.  Intolerance to Prograf, imuran and Cytoxan oral therapy.  Denies any joint flares since last visit.  Mild worsening shortness of breath.  On prednisone 5 mg daily.  Successfully tapered down from 20 mg to 5 mg since starting rituximab.  Tolerating rituximab well.  Watching fluid intake closely and takes Bumex p.r.n. for edema and congestion.  Established care with our cardiologist Dr. Mcclain.  Had right heart catheterization done in 2023.  Rheumatological review of system negative otherwise.  100% fist formation bilateral hands.  No evidence of synovitis.    1. MCTD (mixed connective tissue disease)    2. ILD (interstitial lung disease)    3. Other systemic lupus erythematosus with lung involvement    4. Drug-induced immunodeficiency    5. Immunosuppressed status    6. Encounter for medication monitoring    7. Anti-RNP antibodies present    8. Pulmonary hypertension    9. PAH (pulmonary artery hypertension) with  connective tissue disease      Problem List Items Addressed This Visit       Anti-RNP antibodies present    Drug-induced immunodeficiency    Encounter for medication monitoring    ILD (interstitial lung disease)    Relevant Orders    Ambulatory referral/consult to Pulmonology    Immunosuppressed status    MCTD (mixed connective tissue disease) - Primary    Relevant Orders    Ambulatory referral/consult to Pulmonology    PAH (pulmonary artery hypertension) with connective tissue disease    Relevant Orders    B-TYPE NATRIURETIC PEPTIDE    Pulmonary hypertension    Relevant Orders    Echo    B-TYPE NATRIURETIC PEPTIDE    Systemic lupus erythematosus with lung involvement      Latest Reference Range & Units Most Recent   ANDERSON Negative  Positive !  7/25/19 09:17   ANDERSON Screen Negative <1:80  Positive !  5/22/23 14:35   ANDERSON HEP-2 Titer  Positive 1:1280 Speckled  8/8/19 13:26   ANDERSON Titer 1  1:320  5/22/23 14:35   ANDERSON PATTERN 1  Speckled  5/22/23 14:35   ds DNA Ab Negative 1:10  Negative 1:10  5/22/23 14:35   Anti-SSA Antibody 0.00 - 0.99 Ratio 0.29  5/22/23 14:35   Anti-SSA Interpretation Negative  Negative  5/22/23 14:35   Anti-SSB Antibody 0.00 - 0.99 Ratio 0.06  5/22/23 14:35   Anti-SSB Interpretation Negative  Negative  5/22/23 14:35   Anti Sm Antibody 0.00 - 0.99 Ratio 0.21  5/22/23 14:35   Anti-Sm Interpretation Negative  Negative  5/22/23 14:35   Anti Sm/RNP Antibody 0.00 - 0.99 Ratio 0.40  5/22/23 14:35   Anti-Sm/RNP Interpretation Negative  Negative  5/22/23 14:35   ANCA Proteinase 3 <0.4 (Negative) U <0.2  8/8/19 13:26   Cytoplasmic Neutrophilic Ab <1:20 Titer <1:20  8/8/19 13:26   MPO <=20 UNITS 4  8/8/19 13:26   Perinuclear (P-ANCA) <1:20 Titer <1:20  8/8/19 13:26   Scleroderma SCL- <20 UNITS 5  8/8/19 13:26   SSA Antibody 0.0 - 0.9 AI 0.5  7/25/19 09:17   SSB Antibody 0.0 - 0.9 AI 2.4 (H)  7/25/19 09:17   CCP Antibodies <5.0 U/mL 0.5  8/8/19 13:26   Complement (C-3) 50 - 180 mg/dL 138  5/6/21 10:20   Complement  (C-4) 11 - 44 mg/dL 33  5/6/21 10:20   IgE 0 - 100 IU/mL <35  5/6/21 10:20   Protein, Serum 6.0 - 8.4 g/dL 7.6  8/8/19 13:26   Albumin grams/dl 3.35 - 5.55 g/dL 3.63  8/8/19 13:26   Alpha-1 grams/dl 0.17 - 0.41 g/dL 0.39  8/8/19 13:26   Alpha-2 0.43 - 0.99 g/dL 0.93  8/8/19 13:26   Beta 0.50 - 1.10 g/dL 1.11 (H)  8/8/19 13:26   Gamma 0.67 - 1.58 g/dL 1.54  8/8/19 13:26   Pathologist Interpretation SPE  REVIEWED  8/8/19 13:26   Pathologist Interpretation ELIZABETH  REVIEWED  8/8/19 13:26   Immunofix Interp.  SEE COMMENT  8/8/19 13:26   Anti-Aleena-1 Antibody <20 Units <20  8/8/19 13:26   Anti-PM/Scl Ab <20 Units <20  8/8/19 13:26   Anti-SS-A 52 kD Ab, IgG <20 Units <20  8/8/19 13:26   Anti-U1-RNP  Ab <20 Units 68 (H)  8/8/19 13:26   EJ Negative  Negative  8/8/19 13:26   Fibrillarin (U3 RNP) Negative  Negative  8/8/19 13:26   Ku Negative  Negative  8/8/19 13:26   MDA-5 (P140) (CADM-140) <20 Units <20  8/8/19 13:26   MI-2 Negative  Negative  8/8/19 13:26   NXP-2 (P140) <20 Units <20  8/8/19 13:26   OJ Negative  Negative  8/8/19 13:26   PL-12 Negative  Negative  8/8/19 13:26   PL-7 Negative  Negative  8/8/19 13:26   Rheumatoid Factor 0.0 - 15.0 IU/mL <10.0  8/8/19 13:26   RNP Antibody 0.0 - 0.9 AI 1.7 (H)  7/25/19 09:17   SRP Negative  Negative  8/8/19 13:26   TIF1 GAMMA (P155/140) <20 Units <20  8/8/19 13:26   U2 snRNP Negative  Negative  8/8/19 13:26   !: Data is abnormal  (H): Data is abnormally high  Severe 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  On prednisone 5 mg daily.  Repeat CT chest, echo and establish care with pulmonologist here.  Will require her repeat right heart catheterization later this year.  Continue rituximab every 4 months with all 4 labs and quantitative immunoglobulins.  Continue follow up with Transplant team for right heart cath,.    Continue Ofev.Repeat CT chest.   Drug induced immunodeficiency due to use of immunosuppressive drugs. Monitor carefully for infections. Advised to get immediate medical care if any infection. Also advised strict adherence to age appropriate vaccinations and cancer screenings with PCP.  I have explained all of the above in detail and the patient understands all of the current recommendation(s). I have answered all questions to the best of my ability and to their complete satisfaction.         Past Medical History:   Diagnosis Date    ADHD (attention deficit hyperactivity disorder)     Asthma     Hypertension     Hypothyroidism     Mixed restrictive and obstructive lung disease     Pulmonary hypertension     Rheumatoid arthritis flare 06/22/2021    TIA (transient ischemic attack)        Past Surgical History:   Procedure Laterality Date    BRONCHOSCOPY Bilateral 8/16/2019    Procedure: Bronchoscopy;  Surgeon: Virgilio Weiss MD;  Location: [facility] ENDO;  Service: Endoscopy;  Laterality: Bilateral;    RIGHT HEART CATHETERIZATION Right 7/5/2023    Procedure: INSERTION, CATHETER, RIGHT HEART;  Surgeon: Kavon Kunz MD;  Location: [facility] CATH LAB;  Service: Cardiology;  Laterality: Right;        Social History     Tobacco Use    Smoking status: Never    Smokeless tobacco: Never   Substance Use Topics    Alcohol use: Not Currently     Comment: social    Drug use: No       Family History   Problem Relation Name Age of Onset    Cancer Maternal Aunt          Breast, Back, Lung Cancer    Cancer Father         Review of patient's allergies indicates:   Allergen Reactions    Ceftriaxone Swelling     Patient states that BP spike when taken rocephin.     Citrus and derivatives     Codeine Swelling       Medication List with Changes/Refills   Current Medications    ALBUTEROL 90 MCG/ACTUATION INHALER    Inhale 2 puffs into the lungs every 6 (six) hours as needed for Wheezing.    ALBUTEROL-IPRATROPIUM (DUO-NEB) 2.5 MG-0.5 MG/3 ML  NEBULIZER SOLUTION    INHALE 1 VIAL PER NEUBULIZER NEBULIZER EVERY 4 HOURS    ATORVASTATIN (LIPITOR) 40 MG TABLET    Take 1 tablet (40 mg total) by mouth every evening.    BACLOFEN (LIORESAL) 10 MG TABLET    Take 1 tablet (10 mg total) by mouth 3 (three) times daily.    BENZONATATE (TESSALON) 200 MG CAPSULE    Take 200 mg by mouth 3 (three) times daily as needed.    BUMETANIDE (BUMEX) 1 MG TABLET    Take 1 tablet (1 mg total) by mouth 2 (two) times daily.    DEXTROAMPHETAMINE-AMPHETAMINE (ADDERALL) 15 MG TABLET        FLUTICASONE (FLONASE) 50 MCG/ACTUATION NASAL SPRAY    2 sprays (100 mcg total) by Each Nare route once daily.    FLUTICASONE-UMECLIDIN-VILANTER (TRELEGY ELLIPTA) 200-62.5-25 MCG INHALER    Inhale 1 puff into the lungs.    FOLIC ACID (FOLVITE) 1 MG TABLET    Take 1,000 mcg by mouth once daily.    HYDROXYCHLOROQUINE (PLAQUENIL) 200 MG TABLET    Take 2 tablets by mouth once daily.    HYDROXYCHLOROQUINE (PLAQUENIL) 200 MG TABLET    TAKE 1 TABLET BY MOUTH TWICE A DAY    MAGNESIUM OXIDE (MAG-OX) 400 MG (241.3 MG MAGNESIUM) TABLET    Take 1 tablet (400 mg total) by mouth once daily.    MONTELUKAST (SINGULAIR) 10 MG TABLET    SMARTSI Tablet(s) By Mouth Every Evening    NIFEDIPINE (ADALAT CC) 30 MG TBSR    TAKE 1 TABLET BY MOUTH EVERY DAY    NYSTATIN (MYCOSTATIN) 100,000 UNIT/ML SUSPENSION    Take 5 mLs by mouth 4 (four) times daily.    OFEV 150 MG CAP    TAKE 1 CAPSULE BY MOUTH TWICE A  DAY 12 HOURS APART WITH FOOD    POTASSIUM CHLORIDE SA (K-DUR,KLOR-CON) 20 MEQ TABLET    Take 1 tablet (20 mEq total) by mouth once daily.    PROMETHAZINE (PHENERGAN) 6.25 MG/5 ML SYRUP    SMARTSI.5-10 Milliliter(s) By Mouth 3 Times Daily    QUETIAPINE (SEROQUEL) 50 MG TABLET    Take 50 mg by mouth every evening.    SEMAGLUTIDE, WEIGHT LOSS, (WEGOVY) 0.25 MG/0.5 ML PNIJ    Inject 0.25 mg into the skin every 7 days. Call office in 6 weeks to increase dose    SULFAMETHOXAZOLE-TRIMETHOPRIM 800-160MG (BACTRIM DS) 800-160 MG  TAB    TAKE 1 TABLET BY MOUTH THREE TIMES WEEKLY    VILAZODONE (VIIBRYD) 10 MG (7)- 20 MG (23) DSPK           Disclaimer: This note was prepared using voice recognition system and is likely to have sound alike errors and is not proof read.  Please call me with any questions.      Total time spent 40 minutes. This includes face to face time and non-face to face time preparing to see the patient (eg, review of tests), obtaining and/or reviewing separately obtained history, documenting clinical information in the electronic or other health record, independently interpreting results and communicating results to the patient/family/caregiver, or care coordinator.

## 2025-02-07 ENCOUNTER — LAB VISIT (OUTPATIENT)
Dept: LAB | Facility: HOSPITAL | Age: 45
End: 2025-02-07
Attending: STUDENT IN AN ORGANIZED HEALTH CARE EDUCATION/TRAINING PROGRAM
Payer: COMMERCIAL

## 2025-02-07 DIAGNOSIS — E88.819 INSULIN RESISTANCE: Primary | ICD-10-CM

## 2025-02-07 LAB
ESTIMATED AVG GLUCOSE: 117 MG/DL (ref 68–131)
HBA1C MFR BLD: 5.7 % (ref 4–5.6)

## 2025-02-07 PROCEDURE — 36415 COLL VENOUS BLD VENIPUNCTURE: CPT | Mod: PN | Performed by: STUDENT IN AN ORGANIZED HEALTH CARE EDUCATION/TRAINING PROGRAM

## 2025-02-07 PROCEDURE — 83036 HEMOGLOBIN GLYCOSYLATED A1C: CPT | Performed by: STUDENT IN AN ORGANIZED HEALTH CARE EDUCATION/TRAINING PROGRAM

## 2025-02-07 PROCEDURE — 82306 VITAMIN D 25 HYDROXY: CPT | Performed by: STUDENT IN AN ORGANIZED HEALTH CARE EDUCATION/TRAINING PROGRAM

## 2025-02-08 LAB
HBV CORE AB SERPL QL IA: NORMAL
HBV SURFACE AG SERPL QL IA: NORMAL
IGG SERPL-MCNC: 1038 MG/DL (ref 650–1600)

## 2025-02-10 ENCOUNTER — PATIENT MESSAGE (OUTPATIENT)
Dept: CARDIOLOGY | Facility: HOSPITAL | Age: 45
End: 2025-02-10
Payer: COMMERCIAL

## 2025-02-10 ENCOUNTER — TELEPHONE (OUTPATIENT)
Dept: CARDIOLOGY | Facility: HOSPITAL | Age: 45
End: 2025-02-10
Payer: COMMERCIAL

## 2025-02-10 LAB — 25(OH)D3+25(OH)D2 SERPL-MCNC: 26 NG/ML (ref 30–96)

## 2025-02-11 ENCOUNTER — TELEPHONE (OUTPATIENT)
Dept: CARDIOLOGY | Facility: HOSPITAL | Age: 45
End: 2025-02-11
Payer: COMMERCIAL

## 2025-02-11 LAB — THYROPEROXIDASE IGG SERPL-ACNC: <6 IU/ML

## 2025-02-14 ENCOUNTER — PATIENT MESSAGE (OUTPATIENT)
Dept: INFUSION THERAPY | Facility: HOSPITAL | Age: 45
End: 2025-02-14
Payer: COMMERCIAL

## 2025-02-14 DIAGNOSIS — J43.9 MIXED RESTRICTIVE AND OBSTRUCTIVE LUNG DISEASE: Primary | ICD-10-CM

## 2025-02-14 DIAGNOSIS — J98.4 MIXED RESTRICTIVE AND OBSTRUCTIVE LUNG DISEASE: Primary | ICD-10-CM

## 2025-02-17 ENCOUNTER — PATIENT MESSAGE (OUTPATIENT)
Dept: PULMONOLOGY | Facility: CLINIC | Age: 45
End: 2025-02-17
Payer: COMMERCIAL

## 2025-02-18 ENCOUNTER — PATIENT MESSAGE (OUTPATIENT)
Dept: TRANSPLANT | Facility: CLINIC | Age: 45
End: 2025-02-18
Payer: COMMERCIAL

## 2025-03-04 ENCOUNTER — OFFICE VISIT (OUTPATIENT)
Dept: CARDIOLOGY | Facility: CLINIC | Age: 45
End: 2025-03-04
Payer: COMMERCIAL

## 2025-03-04 ENCOUNTER — PATIENT MESSAGE (OUTPATIENT)
Dept: CARDIOLOGY | Facility: CLINIC | Age: 45
End: 2025-03-04

## 2025-03-04 DIAGNOSIS — I27.20 PULMONARY HYPERTENSION: Primary | ICD-10-CM

## 2025-03-04 DIAGNOSIS — F90.9 ATTENTION DEFICIT HYPERACTIVITY DISORDER (ADHD), UNSPECIFIED ADHD TYPE: ICD-10-CM

## 2025-03-04 DIAGNOSIS — M32.13 OTHER SYSTEMIC LUPUS ERYTHEMATOSUS WITH LUNG INVOLVEMENT: ICD-10-CM

## 2025-03-04 DIAGNOSIS — Z86.16 HISTORY OF COVID-19: ICD-10-CM

## 2025-03-04 DIAGNOSIS — E78.2 MIXED HYPERLIPIDEMIA: ICD-10-CM

## 2025-03-04 DIAGNOSIS — Z86.73 HISTORY OF TIA (TRANSIENT ISCHEMIC ATTACK): ICD-10-CM

## 2025-03-04 DIAGNOSIS — E11.69 TYPE 2 DIABETES MELLITUS WITH OTHER SPECIFIED COMPLICATION, WITHOUT LONG-TERM CURRENT USE OF INSULIN: ICD-10-CM

## 2025-03-04 DIAGNOSIS — J84.9 ILD (INTERSTITIAL LUNG DISEASE): ICD-10-CM

## 2025-03-04 DIAGNOSIS — R06.09 DYSPNEA ON EXERTION: ICD-10-CM

## 2025-03-04 DIAGNOSIS — R06.09 DOE (DYSPNEA ON EXERTION): ICD-10-CM

## 2025-03-04 DIAGNOSIS — E78.49 OTHER HYPERLIPIDEMIA: ICD-10-CM

## 2025-03-04 DIAGNOSIS — R00.2 PALPITATION: ICD-10-CM

## 2025-03-04 DIAGNOSIS — R60.0 LOCALIZED EDEMA: ICD-10-CM

## 2025-03-04 DIAGNOSIS — G47.33 OSA (OBSTRUCTIVE SLEEP APNEA): ICD-10-CM

## 2025-03-04 DIAGNOSIS — J43.9 MIXED RESTRICTIVE AND OBSTRUCTIVE LUNG DISEASE: ICD-10-CM

## 2025-03-04 DIAGNOSIS — E55.9 VITAMIN D DEFICIENCY: ICD-10-CM

## 2025-03-04 DIAGNOSIS — I10 ESSENTIAL HYPERTENSION, BENIGN: ICD-10-CM

## 2025-03-04 DIAGNOSIS — R06.02 SHORTNESS OF BREATH: ICD-10-CM

## 2025-03-04 DIAGNOSIS — J98.4 MIXED RESTRICTIVE AND OBSTRUCTIVE LUNG DISEASE: ICD-10-CM

## 2025-03-04 RX ORDER — ATORVASTATIN CALCIUM 40 MG/1
40 TABLET, FILM COATED ORAL NIGHTLY
Qty: 90 TABLET | Refills: 1 | Status: SHIPPED | OUTPATIENT
Start: 2025-03-04 | End: 2026-03-04

## 2025-03-04 RX ORDER — ERGOCALCIFEROL 1.25 MG/1
50000 CAPSULE ORAL
Qty: 12 CAPSULE | Refills: 1 | Status: SHIPPED | OUTPATIENT
Start: 2025-03-04

## 2025-03-04 RX ORDER — LANOLIN ALCOHOL/MO/W.PET/CERES
400 CREAM (GRAM) TOPICAL DAILY
Qty: 90 TABLET | Refills: 1 | Status: SHIPPED | OUTPATIENT
Start: 2025-03-04

## 2025-03-04 RX ORDER — BUMETANIDE 1 MG/1
1 TABLET ORAL 2 TIMES DAILY
Qty: 180 TABLET | Refills: 1 | Status: SHIPPED | OUTPATIENT
Start: 2025-03-04

## 2025-03-04 RX ORDER — POTASSIUM CHLORIDE 20 MEQ/1
40 TABLET, EXTENDED RELEASE ORAL DAILY
Qty: 180 TABLET | Refills: 1 | Status: SHIPPED | OUTPATIENT
Start: 2025-03-04

## 2025-03-04 RX ORDER — BENZONATATE 200 MG/1
200 CAPSULE ORAL 3 TIMES DAILY PRN
Qty: 90 CAPSULE | Refills: 1 | Status: SHIPPED | OUTPATIENT
Start: 2025-03-04

## 2025-03-04 NOTE — Clinical Note
Please schedule lipids for tomorrow AM, repeat ECHO is ordered and follow up in 3 months or sooner thanks

## 2025-03-04 NOTE — PROGRESS NOTES
Subjective:   Patient ID:  Daija Luna is a 44 y.o. female who presents for cardiac consult of No chief complaint on file.    The patient location is: home  The chief complaint leading to consultation is: CV follow up    Visit type: audiovisual    Face to Face time with patient: 6 min  41 minutes of total time spent on the encounter, which includes face to face time and non-face to face time preparing to see the patient (eg, review of tests), Obtaining and/or reviewing separately obtained history, Documenting clinical information in the electronic or other health record, Independently interpreting results (not separately reported) and communicating results to the patient/family/caregiver, or Care coordination (not separately reported).         Each patient to whom he or she provides medical services by telemedicine is:  (1) informed of the relationship between the physician and patient and the respective role of any other health care provider with respect to management of the patient; and (2) notified that he or she may decline to receive medical services by telemedicine and may withdraw from such care at any time.    Notes:       The patient came in today for cardiac consult of No chief complaint on file.    Daija Luna is a 44 y.o. female pt with HTN, ADHD, obesity, Hypothyroidism, TIA - 2018, SLE, depression, ILD, h/o COVID 19 presents for follow up CV eval.         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 Can consider repeat RHC if more therapies become available for her disease in the future.  Trying to titrate steroids down, she may see endo for possible Cushings's, feels more sluggish  Wants to try Wegovy again - had some side effects after 1 dose.   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 Normal wall thickness. There is concentric remodeling. There is normal systolic function with a visually estimated ejection fraction of 55 - 60%. There is indeterminate diastolic function.    Right Ventricle: Normal right ventricular cavity size. Wall thickness is normal. Systolic function is mildly reduced.    Mitral Valve: There is mild regurgitation.    Tricuspid Valve: There is mild regurgitation.    Pericardium: There is a small posterior effusion.    Pulmonary Artery: The estimated pulmonary artery systolic pressure is 36 mmHg.    IVC/SVC: Normal venous pressure at 3 mmHg.      Results for orders placed during the hospital encounter of 09/26/24    Cardiac catheterization    Narrative  Aborted invasive cardiology procedure- see Procedure Log link for details.      Results for orders placed during the hospital encounter of 06/03/24    Echo    Interpretation Summary    Left Ventricle: The left ventricle is normal in size. Ventricular mass is normal. Mildly increased wall thickness. There is concentric remodeling. There is normal systolic function with a visually estimated ejection fraction of 55 - 60%. There is normal diastolic function.    Right Ventricle: Normal right ventricular cavity size. Wall thickness is normal. Systolic function is normal.    Pulmonary Artery: The estimated pulmonary artery systolic pressure is 39 mmHg.    IVC/SVC: Normal venous pressure at 3 mmHg.    Pericardium: There is a small effusion. No indication of cardiac tamponade.        Conclusion 10/2021      Normal myocardial perfusion scan. There is no evidence of myocardial ischemia or infarction.    There is a mild to moderate intensity defect in the anterior wall of the left  ventricle, secondary to breast attenuation.    The gated perfusion images showed an ejection fraction of 76% at rest. The gated perfusion images showed an ejection fraction of 65% post stress.    The EKG portion of this study is negative for ischemia.    The patient reported no chest pain during the stress test.    There were no arrhythmias during stress.    Conclusion 10/2021    There were rare hookup related artifacts. Overall, the study was of good quality. The tape was adequate (2 days , 48 hours, 0 minutes).  Sinus rhythm with heart rates varying between 80 and 145 BPM with an average of 96 BPM.  There were PVCs totalling 0 and averaging 0 per hour.  There were PACs totalling 0 and averaging 0 per hour.    Conclusion 10/2021    There is no evidence of a right lower extremity DVT.  There is no evidence of a left lower extremity DVT.    Results for orders placed during the hospital encounter of 06/02/21    Echo Color Flow Doppler? Yes    Interpretation Summary  · The left ventricle is normal in size with moderate concentric hypertrophy and normal systolic function.  · The estimated ejection fraction is 55%.  · Normal left ventricular diastolic function.  · Mild mitral regurgitation.  · Trivial posterior pericardial effusion.  · Normal central venous pressure (3 mmHg).  · The estimated PA systolic pressure is 34 mmHg.  · Normal right ventricular size with normal right ventricular systolic function.      Normal sinus rhythm   Right bundle branch block   Abnormal ECG   No previous ECGs available   Confirmed by ISABELL MADDEN, [facility] ([id number]) on 6/2/2021 11:22:29 PM     Past Medical History:   Diagnosis Date    ADHD (attention deficit hyperactivity disorder)     Asthma     Hypertension     Hypothyroidism     Mixed restrictive and obstructive lung disease     Pulmonary hypertension     Rheumatoid arthritis flare 06/22/2021    TIA (transient ischemic attack)        Past Surgical History:   Procedure Laterality Date     BRONCHOSCOPY Bilateral 2019    Procedure: Bronchoscopy;  Surgeon: Virgilio Weiss MD;  Location: [facility] ENDO;  Service: Endoscopy;  Laterality: Bilateral;    RIGHT HEART CATHETERIZATION Right 2023    Procedure: INSERTION, CATHETER, RIGHT HEART;  Surgeon: Kavon Kunz MD;  Location: [facility] CATH LAB;  Service: Cardiology;  Laterality: Right;       Social History     Tobacco Use    Smoking status: Never    Smokeless tobacco: Never   Substance Use Topics    Alcohol use: Not Currently     Comment: social    Drug use: No       Family History   Problem Relation Name Age of Onset    Cancer Maternal Aunt          Breast, Back, Lung Cancer    Cancer Father         Patient's Medications   New Prescriptions    ERGOCALCIFEROL (ERGOCALCIFEROL) 50,000 UNIT CAP    Take 1 capsule (50,000 Units total) by mouth every 7 days.   Previous Medications    ALBUTEROL 90 MCG/ACTUATION INHALER    Inhale 2 puffs into the lungs every 6 (six) hours as needed for Wheezing.    ALBUTEROL-IPRATROPIUM (DUO-NEB) 2.5 MG-0.5 MG/3 ML NEBULIZER SOLUTION    INHALE 1 VIAL PER NEUBULIZER NEBULIZER EVERY 4 HOURS    BACLOFEN (LIORESAL) 10 MG TABLET    Take 1 tablet (10 mg total) by mouth 3 (three) times daily.    DEXTROAMPHETAMINE-AMPHETAMINE (ADDERALL) 15 MG TABLET        FLUTICASONE (FLONASE) 50 MCG/ACTUATION NASAL SPRAY    2 sprays (100 mcg total) by Each Nare route once daily.    FLUTICASONE-UMECLIDIN-VILANTER (TRELEGY ELLIPTA) 200-62.5-25 MCG INHALER    Inhale 1 puff into the lungs.    FOLIC ACID (FOLVITE) 1 MG TABLET    Take 1,000 mcg by mouth once daily.    HYDROXYCHLOROQUINE (PLAQUENIL) 200 MG TABLET    Take 2 tablets by mouth once daily.    HYDROXYCHLOROQUINE (PLAQUENIL) 200 MG TABLET    TAKE 1 TABLET BY MOUTH TWICE A DAY    MONTELUKAST (SINGULAIR) 10 MG TABLET    SMARTSI Tablet(s) By Mouth Every Evening    NYSTATIN (MYCOSTATIN) 100,000 UNIT/ML SUSPENSION    Take 5 mLs by mouth 4 (four) times daily.    OFEV 150 MG CAP    TAKE 1 CAPSULE BY  MOUTH TWICE A  DAY 12 HOURS APART WITH FOOD    PROMETHAZINE (PHENERGAN) 6.25 MG/5 ML SYRUP    SMARTSI.5-10 Milliliter(s) By Mouth 3 Times Daily    QUETIAPINE (SEROQUEL) 50 MG TABLET    Take 50 mg by mouth every evening.    SULFAMETHOXAZOLE-TRIMETHOPRIM 800-160MG (BACTRIM DS) 800-160 MG TAB    TAKE 1 TABLET BY MOUTH THREE TIMES WEEKLY    VILAZODONE (VIIBRYD) 10 MG (7)- 20 MG (23) DSPK       Modified Medications    Modified Medication Previous Medication    ATORVASTATIN (LIPITOR) 40 MG TABLET atorvastatin (LIPITOR) 40 MG tablet       Take 1 tablet (40 mg total) by mouth every evening.    Take 1 tablet (40 mg total) by mouth every evening.    BENZONATATE (TESSALON) 200 MG CAPSULE benzonatate (TESSALON) 200 MG capsule       Take 1 capsule (200 mg total) by mouth 3 (three) times daily as needed for Cough.    Take 200 mg by mouth 3 (three) times daily as needed.    BUMETANIDE (BUMEX) 1 MG TABLET bumetanide (BUMEX) 1 MG tablet       Take 1 tablet (1 mg total) by mouth 2 (two) times daily.    Take 1 tablet (1 mg total) by mouth 2 (two) times daily.    MAGNESIUM OXIDE (MAG-OX) 400 MG (241.3 MG MAGNESIUM) TABLET magnesium oxide (MAG-OX) 400 mg (241.3 mg magnesium) tablet       Take 1 tablet (400 mg total) by mouth once daily.    Take 1 tablet (400 mg total) by mouth once daily.    POTASSIUM CHLORIDE SA (K-DUR,KLOR-CON) 20 MEQ TABLET potassium chloride SA (K-DUR,KLOR-CON) 20 MEQ tablet       Take 2 tablets (40 mEq total) by mouth once daily.    Take 1 tablet (20 mEq total) by mouth once daily.   Discontinued Medications    NIFEDIPINE (ADALAT CC) 30 MG TBSR    TAKE 1 TABLET BY MOUTH EVERY DAY    SEMAGLUTIDE, WEIGHT LOSS, (WEGOVY) 0.25 MG/0.5 ML PNIJ    Inject 0.25 mg into the skin every 7 days. Call office in 6 weeks to increase dose       Review of Systems   Constitutional:  Positive for malaise/fatigue.   HENT: Negative.     Eyes: Negative.    Respiratory:  Positive for shortness of breath.    Cardiovascular:  Positive for  palpitations and leg swelling.   Gastrointestinal: Negative.    Genitourinary: Negative.    Musculoskeletal: Negative.    Skin: Negative.    Neurological: Negative.    Endo/Heme/Allergies: Negative.    Psychiatric/Behavioral: Negative.     All 12 systems otherwise negative.      Wt Readings from Last 3 Encounters:   10/24/24 109.4 kg (241 lb 2.9 oz)   09/26/24 108.8 kg (239 lb 12.8 oz)   09/26/24 109.3 kg (241 lb)     Temp Readings from Last 3 Encounters:   10/24/24 97.4 °F (36.3 °C)   09/24/24 98.2 °F (36.8 °C)   06/25/24 98.4 °F (36.9 °C)     BP Readings from Last 3 Encounters:   10/24/24 (!) 140/74   09/26/24 130/70   09/24/24 133/71     Pulse Readings from Last 3 Encounters:   10/24/24 80   09/26/24 70   09/24/24 68       There were no vitals taken for this visit.    Objective:   Physical Exam  Constitutional:       General: She is not in acute distress.     Appearance: She is well-developed. She is not diaphoretic.   HENT:      Head: Normocephalic and atraumatic.      Mouth/Throat:      Pharynx: No oropharyngeal exudate.   Eyes:      General: No scleral icterus.        Right eye: No discharge.         Left eye: No discharge.   Pulmonary:      Effort: Pulmonary effort is normal. No respiratory distress.   Skin:     Coloration: Skin is not pale.      Findings: No erythema or rash.   Neurological:      Mental Status: She is alert and oriented to person, place, and time.   Psychiatric:         Behavior: Behavior normal.         Thought Content: Thought content normal.         Judgment: Judgment normal.         Lab Results   Component Value Date     02/06/2025    K 3.0 (L) 02/06/2025     02/06/2025    CO2 26 02/06/2025    BUN 10 02/06/2025    CREATININE 1.0 02/06/2025     (H) 02/06/2025    HGBA1C 5.7 (H) 02/06/2025    MG 2.1 09/26/2024    AST 31 02/06/2025    ALT 17 02/06/2025    ALBUMIN 3.5 02/06/2025    PROT 7.4 02/06/2025    BILITOT 0.3 02/06/2025    WBC 10.12 02/06/2025    HGB 9.8 (L)  02/06/2025    HCT 32.0 (L) 02/06/2025    MCV 84 02/06/2025     02/06/2025    INR 1.0 07/05/2023    TSH 1.687 10/12/2021    CHOL 307 (H) 03/14/2024    HDL 67 03/14/2024    LDLCALC 217.8 (H) 03/14/2024    LDLCALC 220 (H) 07/25/2019    TRIG 111 03/14/2024    BNP <10 02/06/2025     Assessment:      1. Pulmonary hypertension    2. ILD (interstitial lung disease)    3. History of TIA (transient ischemic attack)    4. BMI 40.0-44.9, adult    5. Palpitation    6. Essential hypertension, benign    7. Type 2 diabetes mellitus with other specified complication, without long-term current use of insulin    8. Mixed hyperlipidemia    9. EMELIA (obstructive sleep apnea)    10. Shortness of breath    11. BRADY (dyspnea on exertion)    12. Other systemic lupus erythematosus with lung involvement    13. Dyspnea on exertion    14. History of COVID-19    15. Mixed restrictive and obstructive lung disease    16. Attention deficit hyperactivity disorder (ADHD), unspecified ADHD type    17. Localized edema    18. Vitamin D deficiency    19. Other hyperlipidemia            Plan:   1. HTN with edema  - titrate meds  - LE u/s neg for DVT 10/2021.   - rec compression stockings  - stop Nifedipine     2. HLD with elevated TGs  - uncontrolled   - cont meds  - low minerva diet  - Lipids last year worse - Tc 303, . Tg 130.   - increase Lipitor 20mg  to 40 mg    3. ILD with restrictive and obstructive lung disease  - cont tx per pulm    4. SLE  - cont tx per PCP/rheum    5. H/O TIA  - rec asa, statin  - was on statin in past but caused interaction with lupus meds  - f/u neuro  - off zetia    6. BRADY, palpitations, h/o PVCs; h/o COVID 19  - was admitted to [facility]  -had   - Pulm HTN - discussed RHC if symptoms are worse,  referral to PHTN clinic - f/u as needed  - r/o EMELIA  - order again   - Prior Holter inconclusive as pt did not wear it more than a few hours.   - does not want to take verapamil with Ofev due to potential side effects   - ECHO  6/2024 with normal bi V function, PASP 39 mmHg. Small peric effusion.   - cont bumex BID; order ECHO     7. ADHD  - cont tx if needed - stimulants     8. Obesity, BMI 44 - 266 lbs, PreDM -->  BMI 43 - 259 lbs --> BMI 41 - 250 lbs   --> 239 lbs   - cont weight loss     9. DM2, PreDm; Vitamin D def   - took Wegovy once and felt very bad -   - start high dose Vitamin D     Visit today included increased complexity associated with the care of the episodic problem dyspnea addressed and managing the longitudinal care of the patient due to the serious and/or complex managed problem(s) .      Thank you for allowing me to participate in this patient's care. Please do not hesitate to contact me with any questions or concerns. Consult note has been forwarded to the referral physician.

## 2025-03-06 NOTE — PROGRESS NOTES
ENDOCRINOLOGY NEW PATIENT VISIT: 03/07/2025    The patient location is: Home  The chief complaint leading to consultation is: Adrenal Insufficiency Concerns    Visit type: audiovisual    Face to Face time with patient: 40 minutes  55 minutes of total time spent on the encounter, which includes face to face time and non-face to face time preparing to see the patient (eg, review of tests), Obtaining and/or reviewing separately obtained history, Documenting clinical information in the electronic or other health record, Independently interpreting results (not separately reported) and communicating results to the patient/family/caregiver, or Care coordination (not separately reported).     Each patient to whom he or she provides medical services by telemedicine is:  (1) informed of the relationship between the physician and patient and the respective role of any other health care provider with respect to management of the patient; and (2) notified that he or she may decline to receive medical services by telemedicine and may withdraw from such care at any time.      Subjective:      Patient ID: Daija Luna is a 44 y.o. female.    Chief Complaint:  AI concerns    History of Present Illness  Seda Luna presents for evaluation of adrenal function after long term steroid use.  She had been on higher doses of prednisone for many years up until the fall of 2024.  Now referred to endocrine to help with further tapering and removal of steroids.      As for her history, In 2019 she woke up with issues of coughing with new hypertension and was diagnosed with a TIA.  She had an underlying history of asthma prior to this but infrequent need for steroids.  Over the following months after the coughing started she ended up seeing a pulmonologist who then sent her to see a rheumatologist.  She was then diagnosed with SLE with lung involvement.  She was placed on steroids and then still had further coughing leading to more  investigation finding pulmonary hypertension.  Steroids had remained in use for many years with other therapies to include Rituxan (every 4 months).  Also on Plaquenil and duonebs (no inhaled steroids).  She recalls being seen by endocrine doctor in the fall but was told at the time due to being on 20 mg of steroids there was nothing they could do to evaluate for cushing's type symptoms until steroid was tapered.        With regards to adrenal insufficiency:     As of November 2024 she has tapered down to 5 mg a day.  She had been on prednisone for 4-5 years.  Mostly dosing has been 20 mg a day but as high as 40 mg when ill.  She works nights as a nurse and typically wakes up at around 3pm in the afternoon and goes to bed around 5-6 am.  On her single day off every week she maintains a night time schedule for her wake cycle.    Lab Results   Component Value Date    K 3.0 (L) 02/06/2025    K 4.2 10/24/2024     02/06/2025     10/24/2024    CREATININE 1.0 02/06/2025    CREATININE 1.1 10/24/2024     There were no vitals taken for this visit.    - Most recent pituitary and/or adrenal gland imaging:  No prior relevant imaging    - Pertinent factors:  No   Yes  []    [x]   Chronic Steroids (oral, inhaled, topical, or injected)  [x]    []   Check point inhibitor exposure  []    [x]   Autoimmune diseases (SLE with lung involvement)  [x]    []   Infiltrative diseases (TB, fungal infections, hemochromatosis, sarcoidosis)  [x]    []   Recent infection, surgeries, injuries, emotional stress  [x]    []   Adrenal, pituitary surgery or radiation to head/neck    Timeline of steroid use if present:  Prednisone 5 mg daily since November 2024 but prior to this she was on 20 mg of prednisone.  She had been on 20 mg dose for 4-5 years.  No other steroid source for her besides the pill.      - Symptoms of AI:  No   Yes  []    [x]   Fatigue (timing)  [x]    []   Hypotension  []    [x]   Orthostatic hypotension (some symptoms if  "she bends down and gets back up)  [x]    []   Nausea/Vomiting  [x]    []   Abdominal pain  [x]    []   Hypoglycemia  []    [x]   Weakness/Body aches  []    [x]   Salt cravings  [x]    []   Hyperpigmentation  [x]    []   Menstrual Irregularities  [x]    []   Headache  [x]    []   Peripheral vision changes    Family history of autoimmune disease, Whitfield's disease or pituitary disorders:  None    - Symptoms of Cushing's/steroid excess:  No   Yes  []    [x]   Weight changes (some weight loss due to appetite being lower)  [x]    []   Supraclavicular fat pads  []    [x]   Dorsocervical fat pad (subjective)  []    [x]   Striae (lighter in color and thinner in width)  []    [x]   Easy bruising  []    [x]   Acne  []    [x]   Hirsutism  []    [x]   Fatigue  []    [x]   Muscle weakness  [x]    []   Osteoporosis or fractures  [x]    []   Menstrual irregularity  []    [x]   Diabetes/Hyperglycemia (Pre-DM for some time now)  [x]    []   Hypertension      ROS:   As above    Objective:     There were no vitals taken for this visit.  BP Readings from Last 3 Encounters:   10/24/24 (!) 140/74   09/26/24 130/70   09/24/24 133/71     Wt Readings from Last 1 Encounters:   10/24/24 0921 109.4 kg (241 lb 2.9 oz)     There is no height or weight on file to calculate BMI.    Ht:  5'5"  Wt:  239 lbs  BMI:  39.8    Physical Exam  Constitutional:       Appearance: Normal appearance.   Neurological:      Mental Status: She is alert.   Psychiatric:         Mood and Affect: Mood normal.         Behavior: Behavior normal.        Lab Review:   Lab Results   Component Value Date    HGBA1C 5.7 (H) 02/06/2025     Lab Results   Component Value Date    CHOL 307 (H) 03/14/2024    HDL 67 03/14/2024    LDLCALC 217.8 (H) 03/14/2024    TRIG 111 03/14/2024    CHOLHDL 21.8 03/14/2024     Lab Results   Component Value Date     02/06/2025    K 3.0 (L) 02/06/2025     02/06/2025    CO2 26 02/06/2025     (H) 02/06/2025    BUN 10 02/06/2025    " CREATININE 1.0 02/06/2025    CALCIUM 9.3 02/06/2025    PROT 7.4 02/06/2025    ALBUMIN 3.5 02/06/2025    BILITOT 0.3 02/06/2025    ALKPHOS 101 02/06/2025    AST 31 02/06/2025    ALT 17 02/06/2025    ANIONGAP 10 02/06/2025    ESTGFRAFRICA >60.0 10/12/2021    EGFRNONAA >60.0 10/12/2021    TSH 1.687 10/12/2021     Vit D, 25-Hydroxy   Date Value Ref Range Status   02/06/2025 26 (L) 30 - 96 ng/mL Final     Comment:     Vitamin D deficiency.........<10 ng/mL                              Vitamin D insufficiency......10-29 ng/mL       Vitamin D sufficiency........> or equal to 30 ng/mL  Vitamin D toxicity............>100 ng/mL         Assessment and Plan     Obesity  Review of height and weight put BMI around 39.  Concerns are that long-term use of steroid at high doses did contribute over time to what can likely be described as exogenous Cushing's.  Now with steroid dosing reduced we will plan further evaluation as noted.  Ultimately if steroids are removed over time we can consider further evaluation for endogenous Cushing's if symptoms match despite avoidance of exogenous steroids over many months.  Healthy diet and lifestyle is recommended to maintain a healthy weight over time.    Current chronic use of systemic steroids  Chronic use of prednisone over at least 4-5 years.  For most of that time dosing was 20 mg are more.  Now on treatment with steroid sparing agents in the setting of systemic lupus erythematosus including Plaquenil and Rituxan.  Recently prednisone dosing rapidly adjusted from 20 mg to 5 mg which has been at current dose since at least November.  No recent cortisol testing to evaluate for recovery HPA axis.  She has many symptoms that could be consistent with exogenous Cushing's including abdominal weight gain, easy bruising, fatigue, prediabetes, hirsutism, acne and some subjective muscle weakness.  Reviewed that further lab testing we will be needed with her holding steroids for 24 hours prior to  those labs.  Does have complicated sleep-wake cycle given her consistent nighttime work schedule so we will time cortisol testing for what would be considered her normal waking hour around 3:00 p.m..  Ultimately may need stimulation testing.    Plan:   - Continue prednisone 5 mg daily for now   - Plan to hold prednisone for at least 24 hours and check a cortisol.  With ACTH on a 3:00 p.m. blood draw while fasting (considered her normal waking hour)  - If cortisol level is indeterminate we will consider ACTH stimulation testing versus further tapering of prednisone over time    Insulin resistance  Known insulin resistance based on lab review.  Healthy diet and lifestyle should be encouraged.  Target weight loss of 5-10% at first to help with glucose control.        RTC in 12 months.  Labs soon for 3pm fasting blood draw.        **Visit today included increased complexity associated with the care of the problems addressed and managing the longitudinal care of the patient due to the serious and/or complex managed problems      Eleazar Vaz DO     Disclaimer: This note has been generated using voice-recognition software. There may be typographical errors that have been missed during proof-reading.

## 2025-03-07 ENCOUNTER — PATIENT MESSAGE (OUTPATIENT)
Facility: CLINIC | Age: 45
End: 2025-03-07

## 2025-03-07 ENCOUNTER — OFFICE VISIT (OUTPATIENT)
Facility: CLINIC | Age: 45
End: 2025-03-07
Payer: COMMERCIAL

## 2025-03-07 DIAGNOSIS — E66.9 OBESITY, UNSPECIFIED CLASS, UNSPECIFIED OBESITY TYPE, UNSPECIFIED WHETHER SERIOUS COMORBIDITY PRESENT: ICD-10-CM

## 2025-03-07 DIAGNOSIS — E88.819 INSULIN RESISTANCE: ICD-10-CM

## 2025-03-07 DIAGNOSIS — Z79.52 CURRENT CHRONIC USE OF SYSTEMIC STEROIDS: Primary | ICD-10-CM

## 2025-03-07 NOTE — PATIENT INSTRUCTIONS
Thank you for visiting with me during our virtual appointment.      As discussed I do want to have you test cortisol levels and ACTH levels on labs in the near future.  Our staff will help set this up next week with a 3:00 p.m. blood draw (your normal waking time).  Please be sure that you are fasting so that no interactions with blood testing will occur from oral food consumption.  After the blood draw you can resume your normal dose of prednisone.  I do need you to have at least 24 hours without the prednisone dose in your system before the blood test and that can include holding the Sunday dose or taking the Sunday dose earlier than you normally would.    When results come through I will need to look for cortisol levels ideally in the mid teens or higher.  If they are lower than that we may need to lower your prednisone dose overtime to 4 mg and then 3 mg before testing again.      After steroid is completely removed we can consider future lab testing to evaluate if you have any other issues with continued blood sugar, or weight gain for adrenal related causes of those.  In that instance it would be to test for excess cortisol production from the adrenal glands but you have to be off all steroids for at least 2-3 months to do so.      I am hopefull that in the near future we will be able to remove the steroid pill.      Reach out if questions come up.

## 2025-03-07 NOTE — ASSESSMENT & PLAN NOTE
Known insulin resistance based on lab review.  Healthy diet and lifestyle should be encouraged.  Target weight loss of 5-10% at first to help with glucose control.

## 2025-03-07 NOTE — Clinical Note
Please set up a lab with this patient at the [facility] lab in [place], [place].  It needs to be a 3pm blood draw and should be set up for this coming Monday (3/10/25).  This is a fasting lab.    Follow up in 1 year  Thanks

## 2025-03-07 NOTE — ASSESSMENT & PLAN NOTE
Chronic use of prednisone over at least 4-5 years.  For most of that time dosing was 20 mg are more.  Now on treatment with steroid sparing agents in the setting of systemic lupus erythematosus including Plaquenil and Rituxan.  Recently prednisone dosing rapidly adjusted from 20 mg to 5 mg which has been at current dose since at least November.  No recent cortisol testing to evaluate for recovery HPA axis.  She has many symptoms that could be consistent with exogenous Cushing's including abdominal weight gain, easy bruising, fatigue, prediabetes, hirsutism, acne and some subjective muscle weakness.  Reviewed that further lab testing we will be needed with her holding steroids for 24 hours prior to those labs.  Does have complicated sleep-wake cycle given her consistent nighttime work schedule so we will time cortisol testing for what would be considered her normal waking hour around 3:00 p.m..  Ultimately may need stimulation testing.    Plan:   - Continue prednisone 5 mg daily for now   - Plan to hold prednisone for at least 24 hours and check a cortisol.  With ACTH on a 3:00 p.m. blood draw while fasting (considered her normal waking hour)  - If cortisol level is indeterminate we will consider ACTH stimulation testing versus further tapering of prednisone over time

## 2025-03-07 NOTE — ASSESSMENT & PLAN NOTE
Review of height and weight put BMI around 39.  Concerns are that long-term use of steroid at high doses did contribute over time to what can likely be described as exogenous Cushing's.  Now with steroid dosing reduced we will plan further evaluation as noted.  Ultimately if steroids are removed over time we can consider further evaluation for endogenous Cushing's if symptoms match despite avoidance of exogenous steroids over many months.  Healthy diet and lifestyle is recommended to maintain a healthy weight over time.

## 2025-03-10 ENCOUNTER — PATIENT MESSAGE (OUTPATIENT)
Dept: CARDIOLOGY | Facility: HOSPITAL | Age: 45
End: 2025-03-10
Payer: COMMERCIAL

## 2025-03-10 ENCOUNTER — LAB VISIT (OUTPATIENT)
Dept: LAB | Facility: HOSPITAL | Age: 45
End: 2025-03-10
Attending: STUDENT IN AN ORGANIZED HEALTH CARE EDUCATION/TRAINING PROGRAM
Payer: COMMERCIAL

## 2025-03-10 DIAGNOSIS — E78.2 MIXED HYPERLIPIDEMIA: ICD-10-CM

## 2025-03-10 DIAGNOSIS — D64.9 ANEMIA, UNSPECIFIED: ICD-10-CM

## 2025-03-10 DIAGNOSIS — E03.9 PRIMARY HYPOTHYROIDISM: Primary | ICD-10-CM

## 2025-03-10 DIAGNOSIS — Z79.52 CURRENT CHRONIC USE OF SYSTEMIC STEROIDS: ICD-10-CM

## 2025-03-10 LAB
CHOLEST SERPL-MCNC: 175 MG/DL (ref 120–199)
CHOLEST/HDLC SERPL: 3.1 {RATIO} (ref 2–5)
CORTIS SERPL-MCNC: 10.2 UG/DL (ref 4.3–22.4)
HDLC SERPL-MCNC: 57 MG/DL (ref 40–75)
HDLC SERPL: 32.6 % (ref 20–50)
LDLC SERPL CALC-MCNC: 98.4 MG/DL (ref 63–159)
NONHDLC SERPL-MCNC: 118 MG/DL
TRIGL SERPL-MCNC: 98 MG/DL (ref 30–150)

## 2025-03-10 PROCEDURE — 82024 ASSAY OF ACTH: CPT | Performed by: STUDENT IN AN ORGANIZED HEALTH CARE EDUCATION/TRAINING PROGRAM

## 2025-03-10 PROCEDURE — 80061 LIPID PANEL: CPT | Performed by: INTERNAL MEDICINE

## 2025-03-10 PROCEDURE — 82533 TOTAL CORTISOL: CPT | Performed by: STUDENT IN AN ORGANIZED HEALTH CARE EDUCATION/TRAINING PROGRAM

## 2025-03-11 ENCOUNTER — RESULTS FOLLOW-UP (OUTPATIENT)
Dept: ENDOCRINOLOGY | Facility: CLINIC | Age: 45
End: 2025-03-11

## 2025-03-11 ENCOUNTER — OFFICE VISIT (OUTPATIENT)
Dept: PULMONOLOGY | Facility: CLINIC | Age: 45
End: 2025-03-11
Payer: COMMERCIAL

## 2025-03-11 ENCOUNTER — RESULTS FOLLOW-UP (OUTPATIENT)
Dept: CARDIOLOGY | Facility: CLINIC | Age: 45
End: 2025-03-11

## 2025-03-11 VITALS
HEART RATE: 95 BPM | HEIGHT: 65 IN | RESPIRATION RATE: 30 BRPM | WEIGHT: 237.63 LBS | OXYGEN SATURATION: 96 % | SYSTOLIC BLOOD PRESSURE: 122 MMHG | BODY MASS INDEX: 39.59 KG/M2 | DIASTOLIC BLOOD PRESSURE: 84 MMHG

## 2025-03-11 DIAGNOSIS — R06.89 GASPING FOR BREATH: ICD-10-CM

## 2025-03-11 DIAGNOSIS — G47.33 OSA (OBSTRUCTIVE SLEEP APNEA): ICD-10-CM

## 2025-03-11 DIAGNOSIS — G47.34 SLEEP RELATED HYPOXIA: Primary | ICD-10-CM

## 2025-03-11 DIAGNOSIS — J84.9 ILD (INTERSTITIAL LUNG DISEASE): ICD-10-CM

## 2025-03-11 DIAGNOSIS — G47.8 NON-RESTORATIVE SLEEP: ICD-10-CM

## 2025-03-11 DIAGNOSIS — M35.1 MCTD (MIXED CONNECTIVE TISSUE DISEASE): ICD-10-CM

## 2025-03-11 PROCEDURE — 3044F HG A1C LEVEL LT 7.0%: CPT | Mod: CPTII,S$GLB,, | Performed by: INTERNAL MEDICINE

## 2025-03-11 PROCEDURE — 99214 OFFICE O/P EST MOD 30 MIN: CPT | Mod: S$GLB,,, | Performed by: INTERNAL MEDICINE

## 2025-03-11 PROCEDURE — 1160F RVW MEDS BY RX/DR IN RCRD: CPT | Mod: CPTII,S$GLB,, | Performed by: INTERNAL MEDICINE

## 2025-03-11 PROCEDURE — 3079F DIAST BP 80-89 MM HG: CPT | Mod: CPTII,S$GLB,, | Performed by: INTERNAL MEDICINE

## 2025-03-11 PROCEDURE — 3074F SYST BP LT 130 MM HG: CPT | Mod: CPTII,S$GLB,, | Performed by: INTERNAL MEDICINE

## 2025-03-11 PROCEDURE — 1159F MED LIST DOCD IN RCRD: CPT | Mod: CPTII,S$GLB,, | Performed by: INTERNAL MEDICINE

## 2025-03-11 PROCEDURE — 3008F BODY MASS INDEX DOCD: CPT | Mod: CPTII,S$GLB,, | Performed by: INTERNAL MEDICINE

## 2025-03-11 PROCEDURE — 99999 PR PBB SHADOW E&M-EST. PATIENT-LVL V: CPT | Mod: PBBFAC,,, | Performed by: INTERNAL MEDICINE

## 2025-03-11 RX ORDER — VILAZODONE HYDROCHLORIDE 20 MG/1
1 TABLET ORAL EVERY MORNING
COMMUNITY

## 2025-03-11 RX ORDER — IPRATROPIUM BROMIDE AND ALBUTEROL 20; 100 UG/1; UG/1
1 SPRAY, METERED RESPIRATORY (INHALATION) 4 TIMES DAILY
COMMUNITY

## 2025-03-11 NOTE — PROGRESS NOTES
"                                         Pulmonary Outpatient Follow Up Visit     Subjective:    This patient is new to me.  Previous records with colleagues including office visits, testing were personally reviewed.  Outside records under care everywhere if available that includes office visits and testing were reviewed personally as well.     Patient ID: Daija Luna is a 44 y.o. female.    Chief Complaint: Shortness of Breath and Interstitial Lung Disease      HPI          44-year-old female patient with past medical history of autoimmune disorder/seronegative rheumatoid arthritis with interstitial lung disease pulmonary hypertension previously tried inhaled Tyvaso presenting for follow-up.      Sleep-related hypoxemia noted previously on home sleep study.      Reports snoring nonrestorative sleep gasping for breath during sleep.      Works as a nurse.      On Ofev on Plaquenil      Review of Systems   Constitutional:  Positive for fatigue.   Respiratory:  Positive for apnea, snoring, cough, shortness of breath, dyspnea on extertion and somnolence.    Musculoskeletal:  Positive for arthralgias and joint swelling.       Encounter Medications[1]    Objective:     Vital Signs (Most Recent)  Vital Signs  Pulse: 95  Resp: (!) 30  SpO2: 96 %  BP: 122/84  Pain Score: 0-No pain  Height and Weight  Height: 5' 5" (165.1 cm)  Weight: 107.8 kg (237 lb 10.5 oz)  BSA (Calculated - sq m): 2.22 sq meters  BMI (Calculated): 39.5  Weight in (lb) to have BMI = 25: 149.9]  Wt Readings from Last 2 Encounters:   03/11/25 107.8 kg (237 lb 10.5 oz)   10/24/24 109.4 kg (241 lb 2.9 oz)       Physical Exam   Constitutional: She is oriented to person, place, and time. She appears well-developed. She is obese.   Pulmonary/Chest: Normal expansion and effort normal. No respiratory distress. She has decreased breath sounds. She has no wheezes. She has rales.   Neurological: She is alert and oriented to person, place, and time. " "  Psychiatric: Her behavior is normal.       Laboratory  Lab Results   Component Value Date    WBC 10.12 02/06/2025    RBC 3.83 (L) 02/06/2025    HGB 9.8 (L) 02/06/2025    HCT 32.0 (L) 02/06/2025    MCV 84 02/06/2025    MCH 25.6 (L) 02/06/2025    MCHC 30.6 (L) 02/06/2025    RDW 17.2 (H) 02/06/2025     02/06/2025    MPV 11.3 02/06/2025    GRAN 5.3 02/06/2025    GRAN 52.2 02/06/2025    LYMPH 2.1 02/06/2025    LYMPH 20.9 02/06/2025    MONO 0.8 02/06/2025    MONO 7.6 02/06/2025    EOS 1.9 (H) 02/06/2025    BASO 0.08 02/06/2025    EOSINOPHIL 18.3 (H) 02/06/2025    BASOPHIL 0.8 02/06/2025       BMP  Lab Results   Component Value Date     02/06/2025    K 3.0 (L) 02/06/2025     02/06/2025    CO2 26 02/06/2025    BUN 10 02/06/2025    CREATININE 1.0 02/06/2025    CALCIUM 9.3 02/06/2025    ANIONGAP 10 02/06/2025    ESTGFRAFRICA >60.0 10/12/2021    EGFRNONAA >60.0 10/12/2021    AST 31 02/06/2025    ALT 17 02/06/2025    PROT 7.4 02/06/2025       Lab Results   Component Value Date    BNP <10 02/06/2025    BNP <10 09/26/2024    BNP 13 03/14/2024    BNP <10 05/22/2023    BNP <10 04/27/2023    BNP <10 06/07/2021       Lab Results   Component Value Date    TSH 2.860 03/10/2025       Lab Results   Component Value Date    SEDRATE >120 (H) 02/06/2025       Lab Results   Component Value Date    CRP 4.6 02/06/2025     Lab Results   Component Value Date    IGE <35 05/06/2021    IGE <35 08/08/2019        Lab Results   Component Value Date    ASPERGILLUS None Detected 08/08/2019     No results found for: "AFUMIGATUSCL"     Lab Results   Component Value Date    ACE 15 (L) 08/08/2019       Latest Reference Range & Units 06/02/21 14:24   POC PH 7.35 - 7.45  7.422   POC PCO2 35 - 45 mmHg 40.3   POC PO2 80 - 100 mmHg 91   POC HCO3 24 - 28 mmol/L 26.3   POC SATURATED O2 95 - 100 % 97   Sample  ARTERIAL   POC BE -2 to 2 mmol/L 2   [facility]  Room Air   Site  RR       Diagnostic Results:  I have personally reviewed today the " following studies:        HRCT 7/2023    EXAM:  CT CHEST HIGH RESOLUTION WITHOUT CONTRAST     History: Interstitial lung disease and pulmonary hypertension.     Reference exam: CTA chest 03/18/2023     TECHNIQUE: Noncontrast axial images obtained from the thoracic inlet inferiorly to the upper abdomen with the patient supine.  Selected axial images obtained the patient prone.     Selected axial images were also obtained during expiration.     FINDINGS: Reticulonodular opacities seen throughout the lungs with a peripheral and basilar predominance.  There is bronchiectasis at the lung bases.  There are some areas of honeycombing lung bases bilaterally.  Patchy groundglass opacities in the same distribution as above.  No developing pulmonary nodules.     Small hiatal hernia, unchanged.  No aortic aneurysm or adenopathy.        Impression:   Findings compatible with chronic interstitial lung disease which appears unchanged.          Echo 9/2024      Left Ventricle: The left ventricle is normal in size. Normal wall thickness. There is concentric remodeling. There is normal systolic function with a visually estimated ejection fraction of 55 - 60%. There is indeterminate diastolic function.    Right Ventricle: Normal right ventricular cavity size. Wall thickness is normal. Systolic function is mildly reduced.    Mitral Valve: There is mild regurgitation.    Tricuspid Valve: There is mild regurgitation.    Pericardium: There is a small posterior effusion.    Pulmonary Artery: The estimated pulmonary artery systolic pressure is 36 mmHg.    IVC/SVC: Normal venous pressure at 3 mmHg.      RHC 5/2023      The estimated blood loss was <50 mL.    RHC performed via the right IJ. Patient tolerated the procedure well. At rest, upper normal left and normal right side filling pressures (RA= 9 mm of Hg, PCWP= 16 mm of Hg). Mild pulmonary HTN  (PA=45/18 mm of Hg, PA mean=27 mm of Hg, TPG=11, PVR=1.6). High cardiac index and output   (CI=3.1 L/min/m2, CO=6.8 L/min). After bike exercise, her PA pressures kali significantly to (74/25 mm of Hg, PA mean=41 mm of hg) and her PCWP to 25 mm of Hg.    Results communicated to Dr. Steiner who will see the patient in clinic.        Assessment/Plan:   Sleep related hypoxia  -     Polysomnogram (CPAP will be added if patient meets diagnostic criteria.); Future    Non-restorative sleep  -     Polysomnogram (CPAP will be added if patient meets diagnostic criteria.); Future    Gasping for breath  -     Polysomnogram (CPAP will be added if patient meets diagnostic criteria.); Future    MCTD (mixed connective tissue disease)  -     Ambulatory referral/consult to Pulmonology    ILD (interstitial lung disease)  -     Ambulatory referral/consult to Pulmonology      Continue current disease modifying drugs as per Rheumatology.      I will arrange for right heart catheterization to update her hemodynamic measurements for PH in [place] as per patient preference.  I will discuss with   Cardiology.    Check CT scan of the chest to assess for progression of ILD.        In-lab polysomnography ordered.      Additional recommendation to follow above workup.    Follow up in about 3 months (around 6/11/2025).    This note was prepared using voice recognition system and is likely to have sound alike errors that may have been overlooked even after proof reading.  Please call me with any questions    Discussed diagnosis, its evaluation, treatment and usual course. All questions answered.      Latrell Oakes MD         [1]   Outpatient Encounter Medications as of 3/11/2025   Medication Sig Dispense Refill    albuterol-ipratropium (DUO-NEB) 2.5 mg-0.5 mg/3 mL nebulizer solution INHALE 1 VIAL PER NEUBULIZER NEBULIZER EVERY 4 HOURS      atorvastatin (LIPITOR) 40 MG tablet Take 1 tablet (40 mg total) by mouth every evening. 90 tablet 1    baclofen (LIORESAL) 10 MG tablet Take 1 tablet (10 mg total) by mouth 3 (three)  times daily. 90 tablet 11    benzonatate (TESSALON) 200 MG capsule Take 1 capsule (200 mg total) by mouth 3 (three) times daily as needed for Cough. 90 capsule 1    bumetanide (BUMEX) 1 MG tablet Take 1 tablet (1 mg total) by mouth 2 (two) times daily. 180 tablet 1    COMBIVENT RESPIMAT  mcg/actuation inhaler Inhale 1 puff into the lungs 4 (four) times daily.      dextroamphetamine-amphetamine (ADDERALL) 15 mg tablet       ergocalciferol (ERGOCALCIFEROL) 50,000 unit Cap Take 1 capsule (50,000 Units total) by mouth every 7 days. 12 capsule 1    fluticasone (FLONASE) 50 mcg/actuation nasal spray 2 sprays (100 mcg total) by Each Nare route once daily. 16 g 2    folic acid (FOLVITE) 1 MG tablet Take 1,000 mcg by mouth once daily.      hydrOXYchloroQUINE (PLAQUENIL) 200 mg tablet Take 2 tablets by mouth once daily.      hydroxychloroquine (PLAQUENIL) 200 mg tablet TAKE 1 TABLET BY MOUTH TWICE A DAY 60 tablet 6    magnesium aspart,citrate,oxide 400 mg magnesium Cap 1 time each day at the same time.      magnesium oxide (MAG-OX) 400 mg (241.3 mg magnesium) tablet Take 1 tablet (400 mg total) by mouth once daily. 90 tablet 1    montelukast (SINGULAIR) 10 mg tablet SMARTSI Tablet(s) By Mouth Every Evening      nystatin (MYCOSTATIN) 100,000 unit/mL suspension Take 5 mLs by mouth 4 (four) times daily.      OFEV 150 mg Cap TAKE 1 CAPSULE BY MOUTH TWICE A  DAY 12 HOURS APART WITH FOOD 60 capsule 11    potassium chloride SA (K-DUR,KLOR-CON) 20 MEQ tablet Take 2 tablets (40 mEq total) by mouth once daily. 180 tablet 1    promethazine (PHENERGAN) 6.25 mg/5 mL syrup SMARTSI.5-10 Milliliter(s) By Mouth 3 Times Daily      QUEtiapine (SEROQUEL) 50 MG tablet Take 50 mg by mouth every evening.      sulfamethoxazole-trimethoprim 800-160mg (BACTRIM DS) 800-160 mg Tab TAKE 1 TABLET BY MOUTH THREE TIMES WEEKLY 12 tablet 5    vilazodone (VIIBRYD) 10 mg (7)- 20 mg (23) DsPk       vilazodone (VIIBRYD) 20 mg Tab Take 1 tablet by  mouth every morning.      [DISCONTINUED] albuterol 90 mcg/actuation inhaler Inhale 2 puffs into the lungs every 6 (six) hours as needed for Wheezing. 1 Inhaler 1    [DISCONTINUED] atorvastatin (LIPITOR) 40 MG tablet Take 1 tablet (40 mg total) by mouth every evening. 90 tablet 1    [DISCONTINUED] benzonatate (TESSALON) 200 MG capsule Take 200 mg by mouth 3 (three) times daily as needed.      [DISCONTINUED] bumetanide (BUMEX) 1 MG tablet Take 1 tablet (1 mg total) by mouth 2 (two) times daily. 180 tablet 1    [DISCONTINUED] fluticasone-umeclidin-vilanter (TRELEGY ELLIPTA) 200-62.5-25 mcg inhaler Inhale 1 puff into the lungs.      [DISCONTINUED] magnesium oxide (MAG-OX) 400 mg (241.3 mg magnesium) tablet Take 1 tablet (400 mg total) by mouth once daily. 90 tablet 1    [DISCONTINUED] NIFEdipine (ADALAT CC) 30 MG TbSR TAKE 1 TABLET BY MOUTH EVERY DAY 90 tablet 1    [DISCONTINUED] potassium chloride SA (K-DUR,KLOR-CON) 20 MEQ tablet Take 1 tablet (20 mEq total) by mouth once daily. 90 tablet 1    [DISCONTINUED] semaglutide, weight loss, (WEGOVY) 0.25 mg/0.5 mL PnIj Inject 0.25 mg into the skin every 7 days. Call office in 6 weeks to increase dose 3 mL 1     No facility-administered encounter medications on file as of 3/11/2025.

## 2025-03-12 ENCOUNTER — RESULTS FOLLOW-UP (OUTPATIENT)
Dept: PULMONOLOGY | Facility: CLINIC | Age: 45
End: 2025-03-12

## 2025-03-13 ENCOUNTER — PATIENT MESSAGE (OUTPATIENT)
Dept: TRANSPLANT | Facility: CLINIC | Age: 45
End: 2025-03-13
Payer: COMMERCIAL

## 2025-03-14 ENCOUNTER — INFUSION (OUTPATIENT)
Dept: INFUSION THERAPY | Facility: HOSPITAL | Age: 45
End: 2025-03-14
Attending: INTERNAL MEDICINE
Payer: COMMERCIAL

## 2025-03-14 ENCOUNTER — HOSPITAL ENCOUNTER (OUTPATIENT)
Dept: RADIOLOGY | Facility: HOSPITAL | Age: 45
Discharge: HOME | End: 2025-03-14
Attending: INTERNAL MEDICINE
Payer: COMMERCIAL

## 2025-03-14 VITALS
BODY MASS INDEX: 39.47 KG/M2 | RESPIRATION RATE: 16 BRPM | HEART RATE: 79 BPM | TEMPERATURE: 98 F | WEIGHT: 237.19 LBS | OXYGEN SATURATION: 98 % | SYSTOLIC BLOOD PRESSURE: 128 MMHG | DIASTOLIC BLOOD PRESSURE: 80 MMHG

## 2025-03-14 DIAGNOSIS — M06.9 RHEUMATOID ARTHRITIS FLARE: Primary | ICD-10-CM

## 2025-03-14 DIAGNOSIS — M35.1 MCTD (MIXED CONNECTIVE TISSUE DISEASE): ICD-10-CM

## 2025-03-14 DIAGNOSIS — Z79.899 HIGH RISK MEDICATION USE: Primary | ICD-10-CM

## 2025-03-14 DIAGNOSIS — J84.9 ILD (INTERSTITIAL LUNG DISEASE): ICD-10-CM

## 2025-03-14 LAB — ACTH PLAS-MCNC: 21 PG/ML (ref 0–46)

## 2025-03-14 PROCEDURE — 96375 TX/PRO/DX INJ NEW DRUG ADDON: CPT

## 2025-03-14 PROCEDURE — 25000003 PHARM REV CODE 250: Performed by: INTERNAL MEDICINE

## 2025-03-14 PROCEDURE — 96413 CHEMO IV INFUSION 1 HR: CPT

## 2025-03-14 PROCEDURE — 96415 CHEMO IV INFUSION ADDL HR: CPT

## 2025-03-14 PROCEDURE — 63600175 PHARM REV CODE 636 W HCPCS: Mod: JZ,TB | Performed by: INTERNAL MEDICINE

## 2025-03-14 PROCEDURE — 71250 CT THORAX DX C-: CPT | Mod: TC

## 2025-03-14 PROCEDURE — 71250 CT THORAX DX C-: CPT | Mod: 26,,, | Performed by: RADIOLOGY

## 2025-03-14 RX ORDER — DIPHENHYDRAMINE HYDROCHLORIDE 50 MG/ML
25 INJECTION, SOLUTION INTRAMUSCULAR; INTRAVENOUS
Status: COMPLETED | OUTPATIENT
Start: 2025-03-14 | End: 2025-03-14

## 2025-03-14 RX ORDER — HEPARIN 100 UNIT/ML
500 SYRINGE INTRAVENOUS
OUTPATIENT
Start: 2025-03-25

## 2025-03-14 RX ORDER — METHYLPREDNISOLONE SOD SUCC 125 MG
100 VIAL (EA) INJECTION
Start: 2025-03-25

## 2025-03-14 RX ORDER — DIPHENHYDRAMINE HYDROCHLORIDE 50 MG/ML
25 INJECTION, SOLUTION INTRAMUSCULAR; INTRAVENOUS
Start: 2025-03-25

## 2025-03-14 RX ORDER — ACETAMINOPHEN 325 MG/1
650 TABLET ORAL
Status: COMPLETED | OUTPATIENT
Start: 2025-03-14 | End: 2025-03-14

## 2025-03-14 RX ORDER — FAMOTIDINE 10 MG/ML
20 INJECTION, SOLUTION INTRAVENOUS
OUTPATIENT
Start: 2025-03-25

## 2025-03-14 RX ORDER — METHYLPREDNISOLONE SOD SUCC 125 MG
100 VIAL (EA) INJECTION
Status: COMPLETED | OUTPATIENT
Start: 2025-03-14 | End: 2025-03-14

## 2025-03-14 RX ORDER — SODIUM CHLORIDE 0.9 % (FLUSH) 0.9 %
10 SYRINGE (ML) INJECTION
OUTPATIENT
Start: 2025-03-25

## 2025-03-14 RX ORDER — FAMOTIDINE 10 MG/ML
20 INJECTION, SOLUTION INTRAVENOUS
Status: COMPLETED | OUTPATIENT
Start: 2025-03-14 | End: 2025-03-14

## 2025-03-14 RX ORDER — ACETAMINOPHEN 325 MG/1
650 TABLET ORAL
OUTPATIENT
Start: 2025-03-25

## 2025-03-14 RX ADMIN — RITUXIMAB 1000 MG: 10 INJECTION, SOLUTION INTRAVENOUS at 09:03

## 2025-03-14 RX ADMIN — DIPHENHYDRAMINE HYDROCHLORIDE 25 MG: 50 INJECTION INTRAMUSCULAR; INTRAVENOUS at 08:03

## 2025-03-14 RX ADMIN — METHYLPREDNISOLONE SODIUM SUCCINATE 100 MG: 125 INJECTION, POWDER, FOR SOLUTION INTRAMUSCULAR; INTRAVENOUS at 08:03

## 2025-03-14 RX ADMIN — ACETAMINOPHEN 650 MG: 325 TABLET ORAL at 08:03

## 2025-03-14 RX ADMIN — FAMOTIDINE 20 MG: 10 INJECTION INTRAVENOUS at 08:03

## 2025-03-14 NOTE — NURSING
Pt requested future pregnancy tests be removed from her treatment plan. Risks discussed with unplanned pregnancy and receiving rituxan. Pt states she understands the risks. Notified SHARLA Hernandez, itzelD and she stated she would remove the future tests

## 2025-03-14 NOTE — PLAN OF CARE
Discussed plan of care with pt. Addressed any and ongoing concerns. Pt denies    Problem: Adult Inpatient Plan of Care  Goal: Plan of Care Review  Outcome: Progressing  Flowsheets (Taken 3/14/2025 0838)  Plan of Care Reviewed With: patient  Goal: Absence of Hospital-Acquired Illness or Injury  Outcome: Progressing  Intervention: Identify and Manage Fall Risk  Flowsheets (Taken 3/14/2025 0838)  Safety Promotion/Fall Prevention:   room near unit station   in recliner, wheels locked   nonskid shoes/socks when out of bed  Intervention: Prevent Infection  Flowsheets (Taken 3/14/2025 0838)  Infection Prevention:   hand hygiene promoted   equipment surfaces disinfected  Goal: Optimal Comfort and Wellbeing  Outcome: Progressing  Intervention: Monitor Pain and Promote Comfort  Flowsheets (Taken 3/14/2025 0838)  Pain Management Interventions:   warm blanket provided   quiet environment facilitated   relaxation techniques promoted  Intervention: Provide Person-Centered Care  Flowsheets (Taken 3/14/2025 0838)  Trust Relationship/Rapport:   care explained   reassurance provided   thoughts/feelings acknowledged   choices provided   empathic listening provided   questions answered   emotional support provided   questions encouraged

## 2025-03-14 NOTE — NURSING
Infusion # Rituxan 32967 mg  Last dose-10/24/24     Any:  -recent illness, infection, or antibiotic use in past week- Denies  -open wounds or mouth sores- denies  -invasive procedures or surgeries in past 4 weeks or in upcoming 4 weeks- denies  -vaccinations in past week- denies  -chance you may be pregnant- denies, on cycle     Recent labs- 3/10/25  Last provider visit- Seen by Dr. MANCILLA on 2/6/25     Premeds- 650 mg Tylenol PO, 100 mg Solumedrol IVP over 3 minutes, 20 mg Pepcid IVP over 3 minutes, and 25 mg Benadryl IVP over 3 minutes.       Rituxan 100 mg administered IV over 4 hours starting at 50 ml/hr and increasing rate by 50 ml/hr every 30 minutes to a max rate of 400 ml/hr per protocol; see MAR and vitals for more details. Tolerated well without adverse events, discharged and ambulatory out of clinic.

## 2025-03-16 ENCOUNTER — RESULTS FOLLOW-UP (OUTPATIENT)
Dept: RHEUMATOLOGY | Facility: CLINIC | Age: 45
End: 2025-03-16

## 2025-03-17 DIAGNOSIS — J98.4 MIXED RESTRICTIVE AND OBSTRUCTIVE LUNG DISEASE: Primary | ICD-10-CM

## 2025-03-17 DIAGNOSIS — J43.9 MIXED RESTRICTIVE AND OBSTRUCTIVE LUNG DISEASE: Primary | ICD-10-CM

## 2025-03-18 DIAGNOSIS — R05.3 CHRONIC COUGH: ICD-10-CM

## 2025-03-18 DIAGNOSIS — J43.9 MIXED RESTRICTIVE AND OBSTRUCTIVE LUNG DISEASE: Primary | ICD-10-CM

## 2025-03-18 DIAGNOSIS — J98.4 MIXED RESTRICTIVE AND OBSTRUCTIVE LUNG DISEASE: Primary | ICD-10-CM

## 2025-03-19 ENCOUNTER — TELEPHONE (OUTPATIENT)
Dept: CARDIOLOGY | Facility: CLINIC | Age: 45
End: 2025-03-19

## 2025-03-19 NOTE — TELEPHONE ENCOUNTER
Telephoned patient to discuss scheduling RHC- left voice mail for call back      ----- Message from Riddhi Oakes MD sent at 3/12/2025 10:56 AM CDT -----  OK , you can schedule any FridayThank you  ----Message---   Riddhi can you add her on for RHC one Friday or during your hosp week? Callie - can you arrange and schedule this and discuss with pt? She had prior RHH in [facility] so she is aware of the procedure. Thanks!- PM  ----- Message -----  From: Latrell Oakes MD  Sent: 3/11/2025   4:29 PM CDT  To: MD Cullen Espino can you please arrange for a right heart catheterization for this patient she would like to have her procedure in [place] to update her PH hemodynamics

## 2025-03-20 RX ORDER — IPRATROPIUM BROMIDE AND ALBUTEROL SULFATE 2.5; .5 MG/3ML; MG/3ML
3 SOLUTION RESPIRATORY (INHALATION) EVERY 4 HOURS PRN
Qty: 360 ML | Refills: 11 | Status: SHIPPED | OUTPATIENT
Start: 2025-03-20

## 2025-03-28 ENCOUNTER — TELEPHONE (OUTPATIENT)
Dept: INFUSION THERAPY | Facility: HOSPITAL | Age: 45
End: 2025-03-28
Payer: COMMERCIAL

## 2025-03-28 NOTE — TELEPHONE ENCOUNTER
Unable to reach patient but left VM letting her know since she is late for her appt. She will have to reschedule and to call back.

## 2025-04-01 NOTE — TELEPHONE ENCOUNTER
Ms. Luna returned call and confirmed date/time for RHC, reviewed all instructions, answered all questions,  Sent portal message with instructions also

## 2025-04-10 ENCOUNTER — PATIENT MESSAGE (OUTPATIENT)
Facility: CLINIC | Age: 45
End: 2025-04-10
Payer: COMMERCIAL

## 2025-04-25 ENCOUNTER — TELEPHONE (OUTPATIENT)
Dept: CARDIOLOGY | Facility: CLINIC | Age: 45
End: 2025-04-25
Payer: COMMERCIAL
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
## 2025-04-28 ENCOUNTER — PATIENT MESSAGE (OUTPATIENT)
Dept: TRANSPLANT | Facility: CLINIC | Age: 45
End: 2025-04-28
Payer: COMMERCIAL

## 2025-05-29 ENCOUNTER — PATIENT MESSAGE (OUTPATIENT)
Dept: RHEUMATOLOGY | Facility: CLINIC | Age: 45
End: 2025-05-29

## 2025-05-30 ENCOUNTER — TELEPHONE (OUTPATIENT)
Dept: CARDIOLOGY | Facility: CLINIC | Age: 45
End: 2025-05-30
Payer: COMMERCIAL

## 2025-05-30 DIAGNOSIS — I27.9 CHRONIC PULMONARY HEART DISEASE: ICD-10-CM

## 2025-05-30 DIAGNOSIS — I27.20 PULMONARY HYPERTENSION: Primary | ICD-10-CM

## 2025-05-30 NOTE — TELEPHONE ENCOUNTER
Patient called back to reschedule RHC with Dr. Okaes  Rescheduled to Friday, June 13 for 8am with 630am arrival reviewed all instructions as before   Will need Cbc, Bmp and possibly UPT, Lab work scheduled 6/4 with Dr. Johny martin

## 2025-06-03 DIAGNOSIS — R94.31 ABNORMAL EKG: ICD-10-CM

## 2025-06-03 DIAGNOSIS — I10 ESSENTIAL HYPERTENSION, BENIGN: Primary | ICD-10-CM

## 2025-06-10 DIAGNOSIS — I10 ESSENTIAL HYPERTENSION, BENIGN: Primary | ICD-10-CM

## 2025-06-12 ENCOUNTER — TELEPHONE (OUTPATIENT)
Dept: RHEUMATOLOGY | Facility: CLINIC | Age: 45
End: 2025-06-12
Payer: COMMERCIAL

## 2025-06-13 RX ORDER — HYDROXYCHLOROQUINE SULFATE 200 MG/1
200 TABLET, FILM COATED ORAL 2 TIMES DAILY
Qty: 300 TABLET | Refills: 0 | Status: SHIPPED | OUTPATIENT
Start: 2025-06-13

## 2025-06-25 ENCOUNTER — PATIENT MESSAGE (OUTPATIENT)
Dept: TRANSPLANT | Facility: CLINIC | Age: 45
End: 2025-06-25
Payer: COMMERCIAL

## 2025-07-10 ENCOUNTER — LAB VISIT (OUTPATIENT)
Dept: LAB | Facility: HOSPITAL | Age: 45
End: 2025-07-10
Attending: INTERNAL MEDICINE
Payer: COMMERCIAL

## 2025-07-10 DIAGNOSIS — I27.9 CHRONIC PULMONARY HEART DISEASE: ICD-10-CM

## 2025-07-10 DIAGNOSIS — R06.82 TACHYPNEA: ICD-10-CM

## 2025-07-10 DIAGNOSIS — D84.9 IMMUNOSUPPRESSED STATUS: ICD-10-CM

## 2025-07-10 DIAGNOSIS — I27.20 PULMONARY HYPERTENSION: ICD-10-CM

## 2025-07-10 DIAGNOSIS — E78.49 OTHER HYPERLIPIDEMIA: ICD-10-CM

## 2025-07-10 DIAGNOSIS — Z79.899 POLYPHARMACY: ICD-10-CM

## 2025-07-10 LAB
ABSOLUTE EOSINOPHIL (OHS): 0.49 K/UL
ABSOLUTE MONOCYTE (OHS): 1.06 K/UL (ref 0.3–1)
ABSOLUTE NEUTROPHIL COUNT (OHS): 6.73 K/UL (ref 1.8–7.7)
ALBUMIN SERPL BCP-MCNC: 3.2 G/DL (ref 3.5–5.2)
ALP SERPL-CCNC: 81 UNIT/L (ref 40–150)
ALT SERPL W/O P-5'-P-CCNC: 16 UNIT/L (ref 10–44)
ANION GAP (OHS): 8 MMOL/L (ref 8–16)
ANION GAP (OHS): 9 MMOL/L (ref 8–16)
AST SERPL-CCNC: 22 UNIT/L (ref 11–45)
BASOPHILS # BLD AUTO: 0.06 K/UL
BASOPHILS NFR BLD AUTO: 0.5 %
BILIRUB SERPL-MCNC: 0.3 MG/DL (ref 0.1–1)
BNP SERPL-MCNC: <10 PG/ML (ref 0–99)
BUN SERPL-MCNC: 14 MG/DL (ref 6–20)
BUN SERPL-MCNC: 14 MG/DL (ref 6–20)
CALCIUM SERPL-MCNC: 8.6 MG/DL (ref 8.7–10.5)
CALCIUM SERPL-MCNC: 8.6 MG/DL (ref 8.7–10.5)
CHLORIDE SERPL-SCNC: 107 MMOL/L (ref 95–110)
CHLORIDE SERPL-SCNC: 107 MMOL/L (ref 95–110)
CHOLEST SERPL-MCNC: 225 MG/DL (ref 120–199)
CHOLEST/HDLC SERPL: 3.4 {RATIO} (ref 2–5)
CO2 SERPL-SCNC: 24 MMOL/L (ref 23–29)
CO2 SERPL-SCNC: 24 MMOL/L (ref 23–29)
CREAT SERPL-MCNC: 0.9 MG/DL (ref 0.5–1.4)
CREAT SERPL-MCNC: 0.9 MG/DL (ref 0.5–1.4)
ERYTHROCYTE [DISTWIDTH] IN BLOOD BY AUTOMATED COUNT: 18.6 % (ref 11.5–14.5)
GFR SERPLBLD CREATININE-BSD FMLA CKD-EPI: >60 ML/MIN/1.73/M2
GFR SERPLBLD CREATININE-BSD FMLA CKD-EPI: >60 ML/MIN/1.73/M2
GLUCOSE SERPL-MCNC: 86 MG/DL (ref 70–110)
GLUCOSE SERPL-MCNC: 86 MG/DL (ref 70–110)
HCT VFR BLD AUTO: 33.6 % (ref 37–48.5)
HDLC SERPL-MCNC: 67 MG/DL (ref 40–75)
HDLC SERPL: 29.8 % (ref 20–50)
HGB BLD-MCNC: 10.6 GM/DL (ref 12–16)
IGG SERPL-MCNC: 895 MG/DL (ref 650–1600)
IMM GRANULOCYTES # BLD AUTO: 0.06 K/UL (ref 0–0.04)
IMM GRANULOCYTES NFR BLD AUTO: 0.5 % (ref 0–0.5)
LDLC SERPL CALC-MCNC: 141.8 MG/DL (ref 63–159)
LYMPHOCYTES # BLD AUTO: 3.25 K/UL (ref 1–4.8)
MAGNESIUM SERPL-MCNC: 2.2 MG/DL (ref 1.6–2.6)
MCH RBC QN AUTO: 27.5 PG (ref 27–31)
MCHC RBC AUTO-ENTMCNC: 31.5 G/DL (ref 32–36)
MCV RBC AUTO: 87 FL (ref 82–98)
NONHDLC SERPL-MCNC: 158 MG/DL
NUCLEATED RBC (/100WBC) (OHS): 0 /100 WBC
PLATELET # BLD AUTO: 281 K/UL (ref 150–450)
PMV BLD AUTO: 11.4 FL (ref 9.2–12.9)
POTASSIUM SERPL-SCNC: 3.6 MMOL/L (ref 3.5–5.1)
POTASSIUM SERPL-SCNC: 3.7 MMOL/L (ref 3.5–5.1)
PROT SERPL-MCNC: 6.9 GM/DL (ref 6–8.4)
RBC # BLD AUTO: 3.86 M/UL (ref 4–5.4)
RELATIVE EOSINOPHIL (OHS): 4.2 %
RELATIVE LYMPHOCYTE (OHS): 27.9 % (ref 18–48)
RELATIVE MONOCYTE (OHS): 9.1 % (ref 4–15)
RELATIVE NEUTROPHIL (OHS): 57.8 % (ref 38–73)
SODIUM SERPL-SCNC: 139 MMOL/L (ref 136–145)
SODIUM SERPL-SCNC: 140 MMOL/L (ref 136–145)
TRIGL SERPL-MCNC: 81 MG/DL (ref 30–150)
WBC # BLD AUTO: 11.65 K/UL (ref 3.9–12.7)

## 2025-07-10 PROCEDURE — 83880 ASSAY OF NATRIURETIC PEPTIDE: CPT

## 2025-07-10 PROCEDURE — 36415 COLL VENOUS BLD VENIPUNCTURE: CPT | Mod: PN

## 2025-07-10 PROCEDURE — 82784 ASSAY IGA/IGD/IGG/IGM EACH: CPT

## 2025-07-10 PROCEDURE — 80053 COMPREHEN METABOLIC PANEL: CPT

## 2025-07-10 PROCEDURE — 83735 ASSAY OF MAGNESIUM: CPT

## 2025-07-10 PROCEDURE — 80061 LIPID PANEL: CPT

## 2025-07-10 PROCEDURE — 85025 COMPLETE CBC W/AUTO DIFF WBC: CPT

## 2025-07-11 ENCOUNTER — HOSPITAL ENCOUNTER (OUTPATIENT)
Facility: HOSPITAL | Age: 45
Discharge: HOME | End: 2025-07-11
Attending: INTERNAL MEDICINE | Admitting: INTERNAL MEDICINE
Payer: COMMERCIAL

## 2025-07-11 DIAGNOSIS — I27.20 PULMONARY HYPERTENSION: ICD-10-CM

## 2025-07-11 DIAGNOSIS — I27.21 PAH (PULMONARY ARTERY HYPERTENSION) WITH CONNECTIVE TISSUE DISEASE: ICD-10-CM

## 2025-07-11 DIAGNOSIS — R06.02 SOB (SHORTNESS OF BREATH): ICD-10-CM

## 2025-07-11 DIAGNOSIS — M35.9 PAH (PULMONARY ARTERY HYPERTENSION) WITH CONNECTIVE TISSUE DISEASE: ICD-10-CM

## 2025-07-11 LAB
ALLENS TEST: ABNORMAL
B-HCG UR QL: NEGATIVE
CTP QC/QA: YES
DELSYS: ABNORMAL
FIO2: 21
HCO3 UR-SCNC: 25.9 MMOL/L (ref 24–28)
MODE: ABNORMAL
PCO2 BLDA: 47 MMHG (ref 35–45)
PH SMN: 7.35 [PH] (ref 7.35–7.45)
PO2 BLDA: 34 MMHG (ref 40–60)
POC BE: 0 MMOL/L (ref -2–2)
POC SATURATED O2: 62 % (ref 95–100)
SAMPLE: ABNORMAL
SITE: ABNORMAL

## 2025-07-11 PROCEDURE — 93451 RIGHT HEART CATH: CPT | Mod: 26,,, | Performed by: INTERNAL MEDICINE

## 2025-07-11 PROCEDURE — 93451 RIGHT HEART CATH: CPT | Performed by: INTERNAL MEDICINE

## 2025-07-11 PROCEDURE — 82803 BLOOD GASES ANY COMBINATION: CPT

## 2025-07-11 PROCEDURE — C1751 CATH, INF, PER/CENT/MIDLINE: HCPCS | Performed by: INTERNAL MEDICINE

## 2025-07-11 PROCEDURE — 25000003 PHARM REV CODE 250: Performed by: INTERNAL MEDICINE

## 2025-07-11 PROCEDURE — 99152 MOD SED SAME PHYS/QHP 5/>YRS: CPT | Mod: ,,, | Performed by: INTERNAL MEDICINE

## 2025-07-11 PROCEDURE — 99153 MOD SED SAME PHYS/QHP EA: CPT | Performed by: INTERNAL MEDICINE

## 2025-07-11 PROCEDURE — 99900035 HC TECH TIME PER 15 MIN (STAT)

## 2025-07-11 PROCEDURE — 93464 EXERCISE W/HEMODYNAMIC MEAS: CPT | Mod: 74 | Performed by: INTERNAL MEDICINE

## 2025-07-11 PROCEDURE — 63600175 PHARM REV CODE 636 W HCPCS: Performed by: INTERNAL MEDICINE

## 2025-07-11 PROCEDURE — 81025 URINE PREGNANCY TEST: CPT | Performed by: INTERNAL MEDICINE

## 2025-07-11 PROCEDURE — C1894 INTRO/SHEATH, NON-LASER: HCPCS | Performed by: INTERNAL MEDICINE

## 2025-07-11 PROCEDURE — 93464 EXERCISE W/HEMODYNAMIC MEAS: CPT | Mod: 26,52,, | Performed by: INTERNAL MEDICINE

## 2025-07-11 PROCEDURE — 99152 MOD SED SAME PHYS/QHP 5/>YRS: CPT | Performed by: INTERNAL MEDICINE

## 2025-07-11 RX ORDER — ACETAMINOPHEN 325 MG/1
650 TABLET ORAL EVERY 4 HOURS PRN
Status: DISCONTINUED | OUTPATIENT
Start: 2025-07-11 | End: 2025-07-11 | Stop reason: HOSPADM

## 2025-07-11 RX ORDER — FENTANYL CITRATE 50 UG/ML
INJECTION, SOLUTION INTRAMUSCULAR; INTRAVENOUS
Status: DISCONTINUED | OUTPATIENT
Start: 2025-07-11 | End: 2025-07-11 | Stop reason: HOSPADM

## 2025-07-11 RX ORDER — MIDAZOLAM HYDROCHLORIDE 1 MG/ML
INJECTION INTRAMUSCULAR; INTRAVENOUS
Status: DISCONTINUED | OUTPATIENT
Start: 2025-07-11 | End: 2025-07-11 | Stop reason: HOSPADM

## 2025-07-11 RX ORDER — ONDANSETRON 8 MG/1
8 TABLET, ORALLY DISINTEGRATING ORAL EVERY 8 HOURS PRN
Status: DISCONTINUED | OUTPATIENT
Start: 2025-07-11 | End: 2025-07-11 | Stop reason: HOSPADM

## 2025-07-11 RX ORDER — DIPHENHYDRAMINE HCL 50 MG
50 CAPSULE ORAL ONCE
Status: COMPLETED | OUTPATIENT
Start: 2025-07-11 | End: 2025-07-11

## 2025-07-11 RX ORDER — LIDOCAINE HYDROCHLORIDE 20 MG/ML
INJECTION, SOLUTION EPIDURAL; INFILTRATION; INTRACAUDAL; PERINEURAL
Status: DISCONTINUED | OUTPATIENT
Start: 2025-07-11 | End: 2025-07-11 | Stop reason: HOSPADM

## 2025-07-11 RX ADMIN — DIPHENHYDRAMINE HYDROCHLORIDE 50 MG: 50 CAPSULE ORAL at 06:07

## 2025-07-11 NOTE — H&P
SUBJECTIVE:     Procedure: rhc      Chief Complaint/Diagnosis: 44yo female here for rhc for phtn     PTA Medications   Medication Sig    albuterol-ipratropium (DUO-NEB) 2.5 mg-0.5 mg/3 mL nebulizer solution Take 3 mLs by nebulization every 4 (four) hours as needed for Wheezing. Rescue    atorvastatin (LIPITOR) 40 MG tablet Take 1 tablet (40 mg total) by mouth every evening.    baclofen (LIORESAL) 10 MG tablet Take 1 tablet (10 mg total) by mouth 3 (three) times daily.    benzonatate (TESSALON) 200 MG capsule Take 1 capsule (200 mg total) by mouth 3 (three) times daily as needed for Cough.    bumetanide (BUMEX) 1 MG tablet Take 1 tablet (1 mg total) by mouth 2 (two) times daily.    COMBIVENT RESPIMAT  mcg/actuation inhaler Inhale 1 puff into the lungs 4 (four) times daily.    dextroamphetamine-amphetamine (ADDERALL) 15 mg tablet     ergocalciferol (ERGOCALCIFEROL) 50,000 unit Cap Take 1 capsule (50,000 Units total) by mouth every 7 days.    folic acid (FOLVITE) 1 MG tablet Take 1,000 mcg by mouth once daily.    hydroxychloroquine (PLAQUENIL) 200 mg tablet TAKE 1 TABLET BY MOUTH TWICE A DAY    magnesium oxide (MAG-OX) 400 mg (241.3 mg magnesium) tablet Take 1 tablet (400 mg total) by mouth once daily.    nystatin (MYCOSTATIN) 100,000 unit/mL suspension Take 5 mLs by mouth 4 (four) times daily.    OFEV 150 mg Cap TAKE 1 CAPSULE BY MOUTH TWICE A  DAY 12 HOURS APART WITH FOOD    potassium chloride SA (K-DUR,KLOR-CON) 20 MEQ tablet Take 2 tablets (40 mEq total) by mouth once daily.    QUEtiapine (SEROQUEL) 50 MG tablet Take 50 mg by mouth every evening.    fluticasone (FLONASE) 50 mcg/actuation nasal spray 2 sprays (100 mcg total) by Each Nare route once daily.    hydrOXYchloroQUINE (PLAQUENIL) 200 mg tablet Take 2 tablets by mouth once daily.    hydroxychloroquine (PLAQUENIL) 200 mg tablet TAKE 1 TABLET BY MOUTH TWICE DAILY    magnesium aspart,citrate,oxide 400 mg magnesium Cap 1 time each day at the same time.     montelukast (SINGULAIR) 10 mg tablet SMARTSI Tablet(s) By Mouth Every Evening    promethazine (PHENERGAN) 6.25 mg/5 mL syrup SMARTSI.5-10 Milliliter(s) By Mouth 3 Times Daily    sulfamethoxazole-trimethoprim 800-160mg (BACTRIM DS) 800-160 mg Tab TAKE 1 TABLET BY MOUTH THREE TIMES WEEKLY    vilazodone (VIIBRYD) 10 mg (7)- 20 mg (23) DsPk     vilazodone (VIIBRYD) 20 mg Tab Take 1 tablet by mouth every morning.       Review of patient's allergies indicates:   Allergen Reactions    Ceftriaxone Swelling     Patient states that BP spike when taken rocephin.     Citrus and derivatives     Codeine Swelling       Past Medical History:   Diagnosis Date    ADHD (attention deficit hyperactivity disorder)     Asthma     Hypertension     Hypothyroidism     Mixed restrictive and obstructive lung disease     Pulmonary hypertension     Rheumatoid arthritis flare 2021    TIA (transient ischemic attack)      Past Surgical History:   Procedure Laterality Date    BRONCHOSCOPY Bilateral 2019    Procedure: Bronchoscopy;  Surgeon: Virgilio Weiss MD;  Location: [facility] ENDO;  Service: Endoscopy;  Laterality: Bilateral;    RIGHT HEART CATHETERIZATION Right 2023    Procedure: INSERTION, CATHETER, RIGHT HEART;  Surgeon: Kavon Kunz MD;  Location: [facility] CATH LAB;  Service: Cardiology;  Laterality: Right;     Family History   Problem Relation Name Age of Onset    Cancer Maternal Aunt          Breast, Back, Lung Cancer    Cancer Father       Social History[1]     Review of Systems:      OBJECTIVE:     Vital Signs (Most Recent)  Temp: 97.6 °F (36.4 °C) (25)  Pulse: 99 (25)  Resp: (!) 22 (25)  BP: (!) 146/95 (25)  SpO2: 99 % (25)    Physical Exam:  Aaaox 3    Pulmonary hypertension  -     Cardiac catheterization; Standing  -     Place in Outpatient; Standing  -     Vital signs; Standing  -     Verify informed consent, place on front of chart; Standing  -     Void on  call to Cath Lab; Standing  -     Hold heparin drip; Standing  -     Clip and Prep Right Brachial; Standing  -     Diet NPO Except for: Medication; Standing    SOB (shortness of breath)  -     Cardiac catheterization; Standing    PAH (pulmonary artery hypertension) with connective tissue disease  -     Cardiac catheterization; Standing  -     Oxygen Continuous; Standing    Other orders  -     Ambulate; Standing  -     diphenhydrAMINE capsule 50 mg  -     POCT urine pregnancy; Standing             [1]   Social History  Tobacco Use    Smoking status: Never    Smokeless tobacco: Never   Substance Use Topics    Alcohol use: Not Currently     Comment: social    Drug use: No

## 2025-07-11 NOTE — NURSING
0955 : Patient discharged to POV via WC, to care of , NADN, Right Brachial dsg CDI/ no hematoma noted/ pt with AVS/ v/u to all discharge instructions

## 2025-07-11 NOTE — DISCHARGE INSTRUCTIONS
"  Post-op Heart Catheterization    1. DIET: It is advisable for you to follow a diet that limits the intake of salt, sugar, saturated fats and cholesterol.     2. DRIVING: Due to sedation you received during your procedure, DO NOT drive or operate machinery for 24 hours. Avoid making critical decisions or signing legal documents until tomorrow.    3. ACTIVITY: AVOID activities that require bending of the affected arm/wrist for 3 days and submerging the site in water for 3 days. REMOVE the dressing the day after  the procedure, and shower.  Apply a bandaid to site after shower.                                                                                                                4. WOUND CARE: It is not unusual to have a small amount of bruising to appear at or near the puncture site. It is also common to have a tender "knot" develop beneath the skin at the puncture site of the wrist/arm. This is usually scar tissue and is not a cause for concern or alarm. This tender knot may take several weeks to fully resolve. The bruise will usually spread over several days. However, if the lump gets bigger, call your doctor immediately.    5. DISCOMFORT: For general discomfort at the puncture site, you may take 1 or 2 Acetaminophen (Tylenol) tablets every 4 - 6 hours as needed. (Do not take more than 4000 mg a day)    6. SIGNS AND SYMPTOMS TO REPORT:  Call your physician immediately if any of the following occur:                                            1. Loss of feeling, warmth or color to the affected arm/wrist                                                                                                          2. Mild beeding from the site                 3. Pain that is sudden, sharp or persistent in the affected arm/wrist                 4. Swelling or a change in "lump" size, increased redness or drainage at the puncture site                                                                               5. " High fever (101 degrees or higher)    7. GO TO  THE EMERGENCY ROOM OR CALL 911 IF YOU HAVE: Chest pains or discomforts not relieved with 3 nitroglycerin doses (sublingual tablets or spray), numbness or severe pain of limb, if your limb becomes cold or discolored or if you develop uncontrolled bleeding from the puncture site (quickly apply firm, direct pressure above the site).

## 2025-07-11 NOTE — DISCHARGE SUMMARY
[facility])  Cardiology  Discharge Summary      Patient Name: Daija Luna  MRN: [id number]  Admission Date: 7/11/2025  Hospital Length of Stay: 0 days  Discharge Date and Time: 07/11/2025 10:14 AM  Attending Physician: Riddhi Oakes MD    Discharging Provider: Riddhi Oakes MD  Primary Care Physician: Ayla, Primary Doctor    HPI:   No notes on file    Procedure(s) (LRB):  INSERTION, CATHETER, RIGHT HEART (Right)     Indwelling Lines/Drains at time of discharge:  Lines/Drains/Airways       None                   Hospital Course:  No notes on file    Goals of Care Treatment Preferences:         Consults:     Significant Diagnostic Studies: Results for orders placed during the hospital encounter of 07/11/25    Cardiac catheterization    Conclusion    The estimated blood loss was none.    The filling pressures on the left were normal. Pulmonary hypertension was mild.    Mean PA pressure was 25 mmhg at rest and elevated to 35 mmg post exercise with lifting a sand of bag for 2 minutes, exercise stopped due to arm fatigue, PA sat at rest was 71% and down to 62% post exercise , Arterial Sat from pulse ox 100% at rest and 97% after exercise. CO at 6.67 rest , CO 5.67 post 2 mins of exercise with sand bag. /92 at rest, post 133/89 post.    The procedure log was documented by Documenter: Ning Durán RN and verified by Riddhi Oakes MD.    Date: 7/11/2025  Time: 8:30 AM      Pending Diagnostic Studies:       None            There are no hospital problems to display for this patient.    Assessment & plan notes cannot be loaded without a specified hospital service.      Discharged Condition: good    Disposition: Home or Self Care    Follow Up:    Patient Instructions:   No discharge procedures on file.  Medications:  Reconciled Home Medications:      Medication List        ASK your doctor about these medications      atorvastatin 40 MG tablet  Commonly known as: LIPITOR  Take 1 tablet  (40 mg total) by mouth every evening.     baclofen 10 MG tablet  Commonly known as: LIORESAL  Take 1 tablet (10 mg total) by mouth 3 (three) times daily.     benzonatate 200 MG capsule  Commonly known as: TESSALON  Take 1 capsule (200 mg total) by mouth 3 (three) times daily as needed for Cough.     bumetanide 1 MG tablet  Commonly known as: BUMEX  Take 1 tablet (1 mg total) by mouth 2 (two) times daily.     * CombiVENT RESPIMAT  mcg/actuation inhaler  Generic drug: ipratropium-albuteroL  Inhale 1 puff into the lungs 4 (four) times daily.     * albuterol-ipratropium 2.5 mg-0.5 mg/3 mL nebulizer solution  Commonly known as: DUO-NEB  Take 3 mLs by nebulization every 4 (four) hours as needed for Wheezing. Rescue     dextroamphetamine-amphetamine 15 mg tablet  Commonly known as: ADDERALL     ergocalciferol 50,000 unit Cap  Commonly known as: ERGOCALCIFEROL  Take 1 capsule (50,000 Units total) by mouth every 7 days.     fluticasone propionate 50 mcg/actuation nasal spray  Commonly known as: FLONASE  2 sprays (100 mcg total) by Each Nare route once daily.     folic acid 1 MG tablet  Commonly known as: FOLVITE  Take 1,000 mcg by mouth once daily.     * hydroxychloroquine 200 mg tablet  Commonly known as: PLAQUENIL  Take 2 tablets by mouth once daily.     * hydroxychloroquine 200 mg tablet  Commonly known as: PLAQUENIL  TAKE 1 TABLET BY MOUTH TWICE A DAY     * hydroxychloroquine 200 mg tablet  Commonly known as: PLAQUENIL  TAKE 1 TABLET BY MOUTH TWICE DAILY     magnesium aspart,citrate,oxide 400 mg magnesium Cap  1 time each day at the same time.     magnesium oxide 400 mg (241.3 mg magnesium) tablet  Commonly known as: MAG-OX  Take 1 tablet (400 mg total) by mouth once daily.     montelukast 10 mg tablet  Commonly known as: SINGULAIR  SMARTSI Tablet(s) By Mouth Every Evening     nystatin 100,000 unit/mL suspension  Commonly known as: MYCOSTATIN  Take 5 mLs by mouth 4 (four) times daily.     OFEV 150 mg  Cap  Generic drug: nintedanib  TAKE 1 CAPSULE BY MOUTH TWICE A  DAY 12 HOURS APART WITH FOOD     potassium chloride SA 20 MEQ tablet  Commonly known as: K-DUR,KLOR-CON  Take 2 tablets (40 mEq total) by mouth once daily.     promethazine 6.25 mg/5 mL syrup  Commonly known as: PHENERGAN  SMARTSI.5-10 Milliliter(s) By Mouth 3 Times Daily     QUEtiapine 50 MG tablet  Commonly known as: SEROQUEL  Take 50 mg by mouth every evening.     sulfamethoxazole-trimethoprim 800-160mg 800-160 mg Tab  Commonly known as: BACTRIM DS  TAKE 1 TABLET BY MOUTH THREE TIMES WEEKLY     * vilazodone 20 mg Tab  Commonly known as: VIIBRYD  Take 1 tablet by mouth every morning.     * VIIBRYD 10 mg (7)- 20 mg (23) Dspk  Generic drug: vilazodone           * This list has 7 medication(s) that are the same as other medications prescribed for you. Read the directions carefully, and ask your doctor or other care provider to review them with you.                  Time spent on the discharge of patient: 30 minutes    Riddhi Oakes MD  Cardiology  [facility] ([facility])

## 2025-07-14 VITALS
DIASTOLIC BLOOD PRESSURE: 81 MMHG | HEART RATE: 95 BPM | TEMPERATURE: 98 F | WEIGHT: 237.19 LBS | HEIGHT: 65 IN | OXYGEN SATURATION: 98 % | RESPIRATION RATE: 26 BRPM | BODY MASS INDEX: 39.52 KG/M2 | SYSTOLIC BLOOD PRESSURE: 126 MMHG

## 2025-07-26 ENCOUNTER — PATIENT MESSAGE (OUTPATIENT)
Dept: TRANSPLANT | Facility: CLINIC | Age: 45
End: 2025-07-26
Payer: COMMERCIAL

## 2025-07-30 ENCOUNTER — PATIENT MESSAGE (OUTPATIENT)
Dept: PULMONOLOGY | Facility: CLINIC | Age: 45
End: 2025-07-30
Payer: COMMERCIAL

## 2025-07-31 DIAGNOSIS — R05.3 CHRONIC COUGH: Primary | ICD-10-CM

## 2025-08-04 ENCOUNTER — PATIENT MESSAGE (OUTPATIENT)
Dept: CARDIOLOGY | Facility: CLINIC | Age: 45
End: 2025-08-04
Payer: COMMERCIAL

## 2025-08-04 ENCOUNTER — TELEPHONE (OUTPATIENT)
Dept: TRANSPLANT | Facility: CLINIC | Age: 45
End: 2025-08-04
Payer: COMMERCIAL

## 2025-08-04 NOTE — TELEPHONE ENCOUNTER
NN tried to reach patient to discuss Dr. Steiner's recommendations which is that she does not qualify for PH therapy (PVR is normal). His recommendation is to continue to follow up with local cardiologist and pulmonologist.

## 2025-08-05 RX ORDER — PROMETHAZINE HYDROCHLORIDE 6.25 MG/5ML
6.25 SYRUP ORAL EVERY 6 HOURS PRN
Qty: 1 EACH | Refills: 0 | Status: SHIPPED | OUTPATIENT
Start: 2025-08-05 | End: 2025-08-06 | Stop reason: SDUPTHER

## 2025-08-06 DIAGNOSIS — R05.3 CHRONIC COUGH: ICD-10-CM

## 2025-08-06 RX ORDER — PROMETHAZINE HYDROCHLORIDE 6.25 MG/5ML
6.25 SYRUP ORAL EVERY 6 HOURS PRN
Qty: 1 EACH | Refills: 0 | Status: SHIPPED | OUTPATIENT
Start: 2025-08-06 | End: 2025-08-13

## 2025-08-07 ENCOUNTER — TELEPHONE (OUTPATIENT)
Dept: CARDIOLOGY | Facility: CLINIC | Age: 45
End: 2025-08-07
Payer: COMMERCIAL

## 2025-08-07 NOTE — TELEPHONE ENCOUNTER
Attempted contact to patient to schedule an follow up with the provider. Patient did not answer my chart message sent

## 2025-08-14 ENCOUNTER — PATIENT MESSAGE (OUTPATIENT)
Dept: RHEUMATOLOGY | Facility: CLINIC | Age: 45
End: 2025-08-14
Payer: COMMERCIAL

## 2025-08-15 ENCOUNTER — TELEPHONE (OUTPATIENT)
Dept: RHEUMATOLOGY | Facility: CLINIC | Age: 45
End: 2025-08-15
Payer: COMMERCIAL

## 2025-08-18 DIAGNOSIS — Z79.899 HIGH RISK MEDICATION USE: Primary | ICD-10-CM

## 2025-08-20 DIAGNOSIS — J84.9 ILD (INTERSTITIAL LUNG DISEASE): ICD-10-CM

## 2025-08-20 DIAGNOSIS — J84.112 IDIOPATHIC PULMONARY FIBROSIS: ICD-10-CM

## 2025-08-20 RX ORDER — NINTEDANIB 150 MG/1
CAPSULE ORAL
Qty: 60 CAPSULE | Refills: 11 | Status: SHIPPED | OUTPATIENT
Start: 2025-08-20

## 2025-08-26 ENCOUNTER — LAB VISIT (OUTPATIENT)
Dept: LAB | Facility: HOSPITAL | Age: 45
End: 2025-08-26
Attending: INTERNAL MEDICINE
Payer: COMMERCIAL

## 2025-08-26 DIAGNOSIS — J84.9 ILD (INTERSTITIAL LUNG DISEASE): ICD-10-CM

## 2025-08-26 DIAGNOSIS — Z79.899 POLYPHARMACY: ICD-10-CM

## 2025-08-26 DIAGNOSIS — Z79.899 HIGH RISK MEDICATION USE: ICD-10-CM

## 2025-08-26 DIAGNOSIS — M06.9 RHEUMATOID ARTHRITIS FLARE: ICD-10-CM

## 2025-08-26 DIAGNOSIS — M32.13 OTHER SYSTEMIC LUPUS ERYTHEMATOSUS WITH LUNG INVOLVEMENT: ICD-10-CM

## 2025-08-26 DIAGNOSIS — M06.00 SERONEGATIVE RHEUMATOID ARTHRITIS: ICD-10-CM

## 2025-08-26 DIAGNOSIS — D84.9 IMMUNOSUPPRESSED STATUS: ICD-10-CM

## 2025-08-26 DIAGNOSIS — R06.82 TACHYPNEA: ICD-10-CM

## 2025-08-26 DIAGNOSIS — M35.1 MCTD (MIXED CONNECTIVE TISSUE DISEASE): ICD-10-CM

## 2025-08-26 LAB
ABSOLUTE EOSINOPHIL (OHS): 0.25 K/UL
ABSOLUTE MONOCYTE (OHS): 0.87 K/UL (ref 0.3–1)
ABSOLUTE NEUTROPHIL COUNT (OHS): 7.43 K/UL (ref 1.8–7.7)
ALBUMIN SERPL BCP-MCNC: 3.7 G/DL (ref 3.5–5.2)
ALBUMIN SERPL BCP-MCNC: 3.8 G/DL (ref 3.5–5.2)
ALP SERPL-CCNC: 76 UNIT/L (ref 40–150)
ALP SERPL-CCNC: 76 UNIT/L (ref 40–150)
ALT SERPL W/O P-5'-P-CCNC: 20 UNIT/L (ref 10–44)
ALT SERPL W/O P-5'-P-CCNC: 28 UNIT/L (ref 0–55)
ANION GAP (OHS): 11 MMOL/L (ref 8–16)
ANION GAP (OHS): 13 MMOL/L (ref 8–16)
AST SERPL-CCNC: 32 UNIT/L (ref 11–45)
AST SERPL-CCNC: 38 UNIT/L (ref 0–50)
BASOPHILS # BLD AUTO: 0.06 K/UL
BASOPHILS NFR BLD AUTO: 0.5 %
BILIRUB SERPL-MCNC: 0.4 MG/DL (ref 0.1–1)
BILIRUB SERPL-MCNC: 0.5 MG/DL (ref 0.1–1)
BUN SERPL-MCNC: 8 MG/DL (ref 6–20)
BUN SERPL-MCNC: 9 MG/DL (ref 6–20)
CALCIUM SERPL-MCNC: 9 MG/DL (ref 8.7–10.5)
CALCIUM SERPL-MCNC: 9.1 MG/DL (ref 8.7–10.5)
CHLORIDE SERPL-SCNC: 103 MMOL/L (ref 95–110)
CHLORIDE SERPL-SCNC: 103 MMOL/L (ref 95–110)
CO2 SERPL-SCNC: 22 MMOL/L (ref 23–29)
CO2 SERPL-SCNC: 26 MMOL/L (ref 23–29)
CREAT SERPL-MCNC: 0.9 MG/DL (ref 0.5–1.4)
CREAT SERPL-MCNC: 0.9 MG/DL (ref 0.5–1.4)
CRP SERPL-MCNC: 5.3 MG/L
ERYTHROCYTE [DISTWIDTH] IN BLOOD BY AUTOMATED COUNT: 18.2 % (ref 11.5–14.5)
GFR SERPLBLD CREATININE-BSD FMLA CKD-EPI: >60 ML/MIN/1.73/M2
GFR SERPLBLD CREATININE-BSD FMLA CKD-EPI: >60 ML/MIN/1.73/M2
GLUCOSE SERPL-MCNC: 87 MG/DL (ref 70–110)
GLUCOSE SERPL-MCNC: 96 MG/DL (ref 70–110)
HCT VFR BLD AUTO: 39.9 % (ref 37–48.5)
HGB BLD-MCNC: 12.3 GM/DL (ref 12–16)
IGG SERPL-MCNC: 1054 MG/DL (ref 650–1600)
IMM GRANULOCYTES # BLD AUTO: 0.04 K/UL (ref 0–0.04)
IMM GRANULOCYTES NFR BLD AUTO: 0.4 % (ref 0–0.5)
LYMPHOCYTES # BLD AUTO: 2.63 K/UL (ref 1–4.8)
MAGNESIUM SERPL-MCNC: 2.5 MG/DL (ref 1.6–2.6)
MCH RBC QN AUTO: 28.1 PG (ref 27–31)
MCHC RBC AUTO-ENTMCNC: 30.8 G/DL (ref 32–36)
MCV RBC AUTO: 91 FL (ref 82–98)
NT-PROBNP SERPL-MCNC: 22 PG/ML
NUCLEATED RBC (/100WBC) (OHS): 0 /100 WBC
PLATELET # BLD AUTO: 325 K/UL (ref 150–450)
PMV BLD AUTO: 11.1 FL (ref 9.2–12.9)
POTASSIUM SERPL-SCNC: 3.7 MMOL/L (ref 3.5–5.1)
POTASSIUM SERPL-SCNC: 3.9 MMOL/L (ref 3.5–5.1)
PROT SERPL-MCNC: 7.3 GM/DL (ref 6–8.4)
PROT SERPL-MCNC: 7.4 GM/DL (ref 6–8.4)
RBC # BLD AUTO: 4.37 M/UL (ref 4–5.4)
RELATIVE EOSINOPHIL (OHS): 2.2 %
RELATIVE LYMPHOCYTE (OHS): 23.3 % (ref 18–48)
RELATIVE MONOCYTE (OHS): 7.7 % (ref 4–15)
RELATIVE NEUTROPHIL (OHS): 65.9 % (ref 38–73)
SODIUM SERPL-SCNC: 138 MMOL/L (ref 136–145)
SODIUM SERPL-SCNC: 140 MMOL/L (ref 136–145)
WBC # BLD AUTO: 11.28 K/UL (ref 3.9–12.7)

## 2025-08-26 PROCEDURE — 85025 COMPLETE CBC W/AUTO DIFF WBC: CPT

## 2025-08-26 PROCEDURE — 86803 HEPATITIS C AB TEST: CPT

## 2025-08-26 PROCEDURE — 86140 C-REACTIVE PROTEIN: CPT

## 2025-08-26 PROCEDURE — 83880 ASSAY OF NATRIURETIC PEPTIDE: CPT

## 2025-08-26 PROCEDURE — 87340 HEPATITIS B SURFACE AG IA: CPT

## 2025-08-26 PROCEDURE — 86706 HEP B SURFACE ANTIBODY: CPT | Mod: 59

## 2025-08-26 PROCEDURE — 86704 HEP B CORE ANTIBODY TOTAL: CPT

## 2025-08-26 PROCEDURE — 82040 ASSAY OF SERUM ALBUMIN: CPT

## 2025-08-26 PROCEDURE — 83735 ASSAY OF MAGNESIUM: CPT

## 2025-08-26 PROCEDURE — 80053 COMPREHEN METABOLIC PANEL: CPT

## 2025-08-26 PROCEDURE — 36415 COLL VENOUS BLD VENIPUNCTURE: CPT | Mod: PN

## 2025-08-26 PROCEDURE — 82784 ASSAY IGA/IGD/IGG/IGM EACH: CPT

## 2025-08-27 LAB
HBV CORE AB SERPL QL IA: NORMAL
HBV SURFACE AB SER-ACNC: 101.9 MIU/ML
HBV SURFACE AB SERPL IA-ACNC: REACTIVE M[IU]/ML
HBV SURFACE AG SERPL QL IA: NORMAL
HCV AB SERPL QL IA: NORMAL

## 2025-08-28 ENCOUNTER — PATIENT MESSAGE (OUTPATIENT)
Dept: RHEUMATOLOGY | Facility: CLINIC | Age: 45
End: 2025-08-28

## 2025-08-28 ENCOUNTER — OFFICE VISIT (OUTPATIENT)
Dept: RHEUMATOLOGY | Facility: CLINIC | Age: 45
End: 2025-08-28
Payer: COMMERCIAL

## 2025-08-28 ENCOUNTER — INFUSION (OUTPATIENT)
Dept: INFUSION THERAPY | Facility: HOSPITAL | Age: 45
End: 2025-08-28
Attending: INTERNAL MEDICINE
Payer: COMMERCIAL

## 2025-08-28 VITALS
HEART RATE: 84 BPM | OXYGEN SATURATION: 100 % | SYSTOLIC BLOOD PRESSURE: 118 MMHG | TEMPERATURE: 98 F | WEIGHT: 234 LBS | RESPIRATION RATE: 18 BRPM | DIASTOLIC BLOOD PRESSURE: 79 MMHG | BODY MASS INDEX: 38.94 KG/M2

## 2025-08-28 DIAGNOSIS — M35.9 PAH (PULMONARY ARTERY HYPERTENSION) WITH CONNECTIVE TISSUE DISEASE: ICD-10-CM

## 2025-08-28 DIAGNOSIS — J84.9 ILD (INTERSTITIAL LUNG DISEASE): Primary | ICD-10-CM

## 2025-08-28 DIAGNOSIS — M06.9 RHEUMATOID ARTHRITIS FLARE: Primary | ICD-10-CM

## 2025-08-28 DIAGNOSIS — D84.9 IMMUNOSUPPRESSED STATUS: ICD-10-CM

## 2025-08-28 DIAGNOSIS — I27.21 PAH (PULMONARY ARTERY HYPERTENSION) WITH CONNECTIVE TISSUE DISEASE: ICD-10-CM

## 2025-08-28 DIAGNOSIS — Z79.899 DRUG-INDUCED IMMUNODEFICIENCY: ICD-10-CM

## 2025-08-28 DIAGNOSIS — K21.9 GASTROESOPHAGEAL REFLUX DISEASE, UNSPECIFIED WHETHER ESOPHAGITIS PRESENT: ICD-10-CM

## 2025-08-28 DIAGNOSIS — M35.1 MCTD (MIXED CONNECTIVE TISSUE DISEASE): Primary | ICD-10-CM

## 2025-08-28 DIAGNOSIS — M35.00 SICCA SYNDROME: ICD-10-CM

## 2025-08-28 DIAGNOSIS — M35.1 MCTD (MIXED CONNECTIVE TISSUE DISEASE): ICD-10-CM

## 2025-08-28 DIAGNOSIS — M32.13 OTHER SYSTEMIC LUPUS ERYTHEMATOSUS WITH LUNG INVOLVEMENT: ICD-10-CM

## 2025-08-28 DIAGNOSIS — D84.821 DRUG-INDUCED IMMUNODEFICIENCY: ICD-10-CM

## 2025-08-28 DIAGNOSIS — I73.00 RAYNAUD'S DISEASE WITHOUT GANGRENE: ICD-10-CM

## 2025-08-28 DIAGNOSIS — R06.09 DYSPNEA ON EXERTION: ICD-10-CM

## 2025-08-28 DIAGNOSIS — I27.20 PULMONARY HYPERTENSION: ICD-10-CM

## 2025-08-28 DIAGNOSIS — R76.8 ANTI-RNP ANTIBODIES PRESENT: ICD-10-CM

## 2025-08-28 PROCEDURE — 96366 THER/PROPH/DIAG IV INF ADDON: CPT

## 2025-08-28 PROCEDURE — 96375 TX/PRO/DX INJ NEW DRUG ADDON: CPT

## 2025-08-28 PROCEDURE — 63600175 PHARM REV CODE 636 W HCPCS: Performed by: INTERNAL MEDICINE

## 2025-08-28 PROCEDURE — 25000003 PHARM REV CODE 250: Performed by: INTERNAL MEDICINE

## 2025-08-28 PROCEDURE — 96365 THER/PROPH/DIAG IV INF INIT: CPT

## 2025-08-28 RX ORDER — METHYLPREDNISOLONE SOD SUCC 125 MG
100 VIAL (EA) INJECTION
Status: COMPLETED | OUTPATIENT
Start: 2025-08-28 | End: 2025-08-28

## 2025-08-28 RX ORDER — FAMOTIDINE 10 MG/ML
20 INJECTION, SOLUTION INTRAVENOUS
OUTPATIENT
Start: 2025-08-28 | End: 2025-08-28

## 2025-08-28 RX ORDER — METHYLPREDNISOLONE SOD SUCC 125 MG
100 VIAL (EA) INJECTION
Start: 2025-08-28 | End: 2025-08-28

## 2025-08-28 RX ORDER — DIPHENHYDRAMINE HYDROCHLORIDE 50 MG/ML
25 INJECTION, SOLUTION INTRAMUSCULAR; INTRAVENOUS
Status: COMPLETED | OUTPATIENT
Start: 2025-08-28 | End: 2025-08-28

## 2025-08-28 RX ORDER — ACETAMINOPHEN 325 MG/1
650 TABLET ORAL
Status: COMPLETED | OUTPATIENT
Start: 2025-08-28 | End: 2025-08-28

## 2025-08-28 RX ORDER — DIPHENHYDRAMINE HYDROCHLORIDE 50 MG/ML
25 INJECTION, SOLUTION INTRAMUSCULAR; INTRAVENOUS
Start: 2025-08-28 | End: 2025-08-28

## 2025-08-28 RX ORDER — FAMOTIDINE 10 MG/ML
20 INJECTION, SOLUTION INTRAVENOUS
Status: COMPLETED | OUTPATIENT
Start: 2025-08-28 | End: 2025-08-28

## 2025-08-28 RX ORDER — HEPARIN 100 UNIT/ML
500 SYRINGE INTRAVENOUS
OUTPATIENT
Start: 2025-08-28

## 2025-08-28 RX ORDER — ACETAMINOPHEN 325 MG/1
650 TABLET ORAL
OUTPATIENT
Start: 2025-08-28 | End: 2025-08-28

## 2025-08-28 RX ORDER — SILDENAFIL CITRATE 20 MG/1
20 TABLET ORAL 3 TIMES DAILY
Qty: 90 TABLET | Refills: 11 | Status: ACTIVE | OUTPATIENT
Start: 2025-08-28 | End: 2026-08-28

## 2025-08-28 RX ORDER — SODIUM CHLORIDE 0.9 % (FLUSH) 0.9 %
10 SYRINGE (ML) INJECTION
OUTPATIENT
Start: 2025-08-28

## 2025-08-28 RX ADMIN — METHYLPREDNISOLONE SODIUM SUCCINATE 100 MG: 125 INJECTION, POWDER, FOR SOLUTION INTRAMUSCULAR; INTRAVENOUS at 10:08

## 2025-08-28 RX ADMIN — RITUXIMAB 1000 MG: 10 INJECTION, SOLUTION INTRAVENOUS at 10:08

## 2025-08-28 RX ADMIN — ACETAMINOPHEN 650 MG: 325 TABLET ORAL at 10:08

## 2025-08-28 RX ADMIN — DIPHENHYDRAMINE HYDROCHLORIDE 25 MG: 50 INJECTION INTRAMUSCULAR; INTRAVENOUS at 10:08

## 2025-08-28 RX ADMIN — FAMOTIDINE 20 MG: 10 INJECTION INTRAVENOUS at 10:08

## 2025-09-02 ENCOUNTER — TELEPHONE (OUTPATIENT)
Dept: TRANSPLANT | Facility: CLINIC | Age: 45
End: 2025-09-02
Payer: COMMERCIAL

## 2025-09-02 RX ORDER — BENZONATATE 200 MG/1
200 CAPSULE ORAL 3 TIMES DAILY PRN
Qty: 90 CAPSULE | Refills: 1 | Status: SHIPPED | OUTPATIENT
Start: 2025-09-02

## 2025-09-02 RX ORDER — PILOCARPINE HYDROCHLORIDE 5 MG/1
5 TABLET, FILM COATED ORAL 2 TIMES DAILY
Qty: 60 TABLET | Refills: 11 | Status: SHIPPED | OUTPATIENT
Start: 2025-09-02 | End: 2026-09-02

## (undated) DEVICE — COVER BAND BAG 28 X 12

## (undated) DEVICE — PACK HEART CATH BR

## (undated) DEVICE — KIT SYR REUSABLE

## (undated) DEVICE — TRAY CATH LAB OMC

## (undated) DEVICE — DRAPE ANGIO BRACH 38X44IN

## (undated) DEVICE — CATH SWAN GANZ STND 7FR

## (undated) DEVICE — KIT PROBE COVER WITH GEL

## (undated) DEVICE — ANGIOTOUCH KIT

## (undated) DEVICE — KIT MICROINTRODUCE MINI 5X10CM

## (undated) DEVICE — CATH CV QD LUMN 6FRX110CM

## (undated) DEVICE — KIT GLIDESHEATH SLEND 6FR 10CM

## (undated) DEVICE — KIT MANIFOLD LOW PRESS TUBING

## (undated) DEVICE — SHEATH INTRODUCER 7FR 11CM

## (undated) DEVICE — TRANSDUCER ADULT DISP